# Patient Record
Sex: FEMALE | Race: WHITE | NOT HISPANIC OR LATINO | URBAN - METROPOLITAN AREA
[De-identification: names, ages, dates, MRNs, and addresses within clinical notes are randomized per-mention and may not be internally consistent; named-entity substitution may affect disease eponyms.]

---

## 2018-10-07 ENCOUNTER — INPATIENT (INPATIENT)
Facility: HOSPITAL | Age: 73
LOS: 8 days | Discharge: HOME | DRG: 580 | End: 2018-10-16
Attending: INTERNAL MEDICINE | Admitting: INTERNAL MEDICINE
Payer: MEDICARE

## 2018-10-07 VITALS
TEMPERATURE: 101 F | SYSTOLIC BLOOD PRESSURE: 134 MMHG | DIASTOLIC BLOOD PRESSURE: 83 MMHG | OXYGEN SATURATION: 97 % | WEIGHT: 179.9 LBS | HEIGHT: 62 IN | HEART RATE: 76 BPM | RESPIRATION RATE: 18 BRPM

## 2018-10-07 DIAGNOSIS — I82.492 ACUTE EMBOLISM AND THROMBOSIS OF OTHER SPECIFIED DEEP VEIN OF LEFT LOWER EXTREMITY: ICD-10-CM

## 2018-10-07 LAB
ALBUMIN SERPL ELPH-MCNC: 3.4 G/DL — SIGNIFICANT CHANGE UP (ref 3.3–5)
ALP SERPL-CCNC: 72 U/L — SIGNIFICANT CHANGE UP (ref 40–120)
ALT FLD-CCNC: 55 U/L — HIGH (ref 10–45)
ANION GAP SERPL CALC-SCNC: 11 MMOL/L — SIGNIFICANT CHANGE UP (ref 5–17)
APTT BLD: 28.7 SEC — SIGNIFICANT CHANGE UP (ref 27.5–37.4)
AST SERPL-CCNC: 47 U/L — HIGH (ref 10–40)
BASOPHILS # BLD AUTO: 0 K/UL — SIGNIFICANT CHANGE UP (ref 0–0.2)
BASOPHILS NFR BLD AUTO: 0.1 % — SIGNIFICANT CHANGE UP (ref 0–2)
BILIRUB SERPL-MCNC: 1.7 MG/DL — HIGH (ref 0.2–1.2)
BUN SERPL-MCNC: 21 MG/DL — SIGNIFICANT CHANGE UP (ref 7–23)
CALCIUM SERPL-MCNC: 9.2 MG/DL — SIGNIFICANT CHANGE UP (ref 8.4–10.5)
CHLORIDE SERPL-SCNC: 89 MMOL/L — LOW (ref 96–108)
CO2 SERPL-SCNC: 28 MMOL/L — SIGNIFICANT CHANGE UP (ref 22–31)
CREAT SERPL-MCNC: 0.82 MG/DL — SIGNIFICANT CHANGE UP (ref 0.5–1.3)
EOSINOPHIL # BLD AUTO: 0.1 K/UL — SIGNIFICANT CHANGE UP (ref 0–0.5)
EOSINOPHIL NFR BLD AUTO: 0.5 % — SIGNIFICANT CHANGE UP (ref 0–6)
GAS PNL BLDV: SIGNIFICANT CHANGE UP
GLUCOSE SERPL-MCNC: 120 MG/DL — HIGH (ref 70–99)
HCT VFR BLD CALC: 43.1 % — SIGNIFICANT CHANGE UP (ref 34.5–45)
HGB BLD-MCNC: 13.7 G/DL — SIGNIFICANT CHANGE UP (ref 11.5–15.5)
INR BLD: 1.23 RATIO — HIGH (ref 0.88–1.16)
LYMPHOCYTES # BLD AUTO: 0.8 K/UL — LOW (ref 1–3.3)
LYMPHOCYTES # BLD AUTO: 4 % — LOW (ref 13–44)
MCHC RBC-ENTMCNC: 25.5 PG — LOW (ref 27–34)
MCHC RBC-ENTMCNC: 31.7 GM/DL — LOW (ref 32–36)
MCV RBC AUTO: 80.3 FL — SIGNIFICANT CHANGE UP (ref 80–100)
MONOCYTES # BLD AUTO: 1.6 K/UL — HIGH (ref 0–0.9)
MONOCYTES NFR BLD AUTO: 7 % — SIGNIFICANT CHANGE UP (ref 2–14)
NEUTROPHILS # BLD AUTO: 15.4 K/UL — HIGH (ref 1.8–7.4)
NEUTROPHILS NFR BLD AUTO: 84 % — HIGH (ref 43–77)
PLATELET # BLD AUTO: 191 K/UL — SIGNIFICANT CHANGE UP (ref 150–400)
POTASSIUM SERPL-MCNC: 4.1 MMOL/L — SIGNIFICANT CHANGE UP (ref 3.5–5.3)
POTASSIUM SERPL-SCNC: 4.1 MMOL/L — SIGNIFICANT CHANGE UP (ref 3.5–5.3)
PROT SERPL-MCNC: 7.8 G/DL — SIGNIFICANT CHANGE UP (ref 6–8.3)
PROTHROM AB SERPL-ACNC: 13.3 SEC — HIGH (ref 9.8–12.7)
RBC # BLD: 5.37 M/UL — HIGH (ref 3.8–5.2)
RBC # FLD: 15.2 % — HIGH (ref 10.3–14.5)
SODIUM SERPL-SCNC: 128 MMOL/L — LOW (ref 135–145)
WBC # BLD: 17.9 K/UL — HIGH (ref 3.8–10.5)
WBC # FLD AUTO: 17.9 K/UL — HIGH (ref 3.8–10.5)

## 2018-10-07 PROCEDURE — 93971 EXTREMITY STUDY: CPT | Mod: 26

## 2018-10-07 PROCEDURE — 74177 CT ABD & PELVIS W/CONTRAST: CPT | Mod: 26

## 2018-10-07 PROCEDURE — 99285 EMERGENCY DEPT VISIT HI MDM: CPT

## 2018-10-07 RX ORDER — PIPERACILLIN AND TAZOBACTAM 4; .5 G/20ML; G/20ML
3.38 INJECTION, POWDER, LYOPHILIZED, FOR SOLUTION INTRAVENOUS EVERY 8 HOURS
Qty: 0 | Refills: 0 | Status: DISCONTINUED | OUTPATIENT
Start: 2018-10-07 | End: 2018-10-14

## 2018-10-07 RX ORDER — SODIUM CHLORIDE 9 MG/ML
500 INJECTION INTRAMUSCULAR; INTRAVENOUS; SUBCUTANEOUS ONCE
Qty: 0 | Refills: 0 | Status: COMPLETED | OUTPATIENT
Start: 2018-10-07 | End: 2018-10-07

## 2018-10-07 RX ORDER — HEPARIN SODIUM 5000 [USP'U]/ML
6500 INJECTION INTRAVENOUS; SUBCUTANEOUS ONCE
Qty: 0 | Refills: 0 | Status: DISCONTINUED | OUTPATIENT
Start: 2018-10-07 | End: 2018-10-07

## 2018-10-07 RX ORDER — POTASSIUM CHLORIDE 20 MEQ
10 PACKET (EA) ORAL DAILY
Qty: 0 | Refills: 0 | Status: DISCONTINUED | OUTPATIENT
Start: 2018-10-07 | End: 2018-10-13

## 2018-10-07 RX ORDER — ENOXAPARIN SODIUM 100 MG/ML
90 INJECTION SUBCUTANEOUS
Qty: 0 | Refills: 0 | Status: DISCONTINUED | OUTPATIENT
Start: 2018-10-07 | End: 2018-10-08

## 2018-10-07 RX ORDER — INFLUENZA VIRUS VACCINE 15; 15; 15; 15 UG/.5ML; UG/.5ML; UG/.5ML; UG/.5ML
0.5 SUSPENSION INTRAMUSCULAR ONCE
Qty: 0 | Refills: 0 | Status: DISCONTINUED | OUTPATIENT
Start: 2018-10-07 | End: 2018-10-16

## 2018-10-07 RX ORDER — HEPARIN SODIUM 5000 [USP'U]/ML
3000 INJECTION INTRAVENOUS; SUBCUTANEOUS EVERY 6 HOURS
Qty: 0 | Refills: 0 | Status: DISCONTINUED | OUTPATIENT
Start: 2018-10-07 | End: 2018-10-07

## 2018-10-07 RX ORDER — ASPIRIN/CALCIUM CARB/MAGNESIUM 324 MG
81 TABLET ORAL DAILY
Qty: 0 | Refills: 0 | Status: DISCONTINUED | OUTPATIENT
Start: 2018-10-07 | End: 2018-10-16

## 2018-10-07 RX ORDER — OXYCODONE AND ACETAMINOPHEN 5; 325 MG/1; MG/1
1 TABLET ORAL ONCE
Qty: 0 | Refills: 0 | Status: DISCONTINUED | OUTPATIENT
Start: 2018-10-07 | End: 2018-10-08

## 2018-10-07 RX ORDER — OXYCODONE AND ACETAMINOPHEN 5; 325 MG/1; MG/1
1 TABLET ORAL EVERY 8 HOURS
Qty: 0 | Refills: 0 | Status: DISCONTINUED | OUTPATIENT
Start: 2018-10-07 | End: 2018-10-09

## 2018-10-07 RX ORDER — METOPROLOL TARTRATE 50 MG
25 TABLET ORAL DAILY
Qty: 0 | Refills: 0 | Status: DISCONTINUED | OUTPATIENT
Start: 2018-10-07 | End: 2018-10-16

## 2018-10-07 RX ORDER — SODIUM CHLORIDE 9 MG/ML
1000 INJECTION INTRAMUSCULAR; INTRAVENOUS; SUBCUTANEOUS ONCE
Qty: 0 | Refills: 0 | Status: COMPLETED | OUTPATIENT
Start: 2018-10-07 | End: 2018-10-07

## 2018-10-07 RX ORDER — ATORVASTATIN CALCIUM 80 MG/1
10 TABLET, FILM COATED ORAL AT BEDTIME
Qty: 0 | Refills: 0 | Status: DISCONTINUED | OUTPATIENT
Start: 2018-10-07 | End: 2018-10-16

## 2018-10-07 RX ORDER — HEPARIN SODIUM 5000 [USP'U]/ML
INJECTION INTRAVENOUS; SUBCUTANEOUS
Qty: 25000 | Refills: 0 | Status: DISCONTINUED | OUTPATIENT
Start: 2018-10-07 | End: 2018-10-07

## 2018-10-07 RX ORDER — HEPARIN SODIUM 5000 [USP'U]/ML
6500 INJECTION INTRAVENOUS; SUBCUTANEOUS EVERY 6 HOURS
Qty: 0 | Refills: 0 | Status: DISCONTINUED | OUTPATIENT
Start: 2018-10-07 | End: 2018-10-07

## 2018-10-07 RX ORDER — MESALAMINE 400 MG
1000 TABLET, DELAYED RELEASE (ENTERIC COATED) ORAL DAILY
Qty: 0 | Refills: 0 | Status: DISCONTINUED | OUTPATIENT
Start: 2018-10-07 | End: 2018-10-08

## 2018-10-07 RX ORDER — ACETAMINOPHEN 500 MG
650 TABLET ORAL EVERY 6 HOURS
Qty: 0 | Refills: 0 | Status: DISCONTINUED | OUTPATIENT
Start: 2018-10-07 | End: 2018-10-07

## 2018-10-07 RX ADMIN — SODIUM CHLORIDE 1000 MILLILITER(S): 9 INJECTION INTRAMUSCULAR; INTRAVENOUS; SUBCUTANEOUS at 20:33

## 2018-10-07 RX ADMIN — Medication 100 MILLIGRAM(S): at 15:25

## 2018-10-07 RX ADMIN — ENOXAPARIN SODIUM 90 MILLIGRAM(S): 100 INJECTION SUBCUTANEOUS at 20:37

## 2018-10-07 RX ADMIN — SODIUM CHLORIDE 500 MILLILITER(S): 9 INJECTION INTRAMUSCULAR; INTRAVENOUS; SUBCUTANEOUS at 15:02

## 2018-10-07 RX ADMIN — Medication 10 MILLIGRAM(S): at 20:21

## 2018-10-07 RX ADMIN — ATORVASTATIN CALCIUM 10 MILLIGRAM(S): 80 TABLET, FILM COATED ORAL at 20:21

## 2018-10-07 RX ADMIN — Medication 650 MILLIGRAM(S): at 14:08

## 2018-10-07 RX ADMIN — PIPERACILLIN AND TAZOBACTAM 25 GRAM(S): 4; .5 INJECTION, POWDER, LYOPHILIZED, FOR SOLUTION INTRAVENOUS at 21:31

## 2018-10-07 RX ADMIN — Medication 81 MILLIGRAM(S): at 20:21

## 2018-10-07 RX ADMIN — OXYCODONE AND ACETAMINOPHEN 1 TABLET(S): 5; 325 TABLET ORAL at 20:35

## 2018-10-07 NOTE — ED PROVIDER NOTE - MEDICAL DECISION MAKING DETAILS
PASHA Chavarria MD: Pt is a 72 y/o female with pmh of  HTN, gerd, newly diagnosed with Ulcerative colitis on prednisone taper who presents to ED for left groin pain x 5 days. she first noted a sore "lump" on her left groin with mild tenderness, which have been progressively been getting worse. She notes increased pain with movement and with palpation. Has also noticed erythema to the area. Dhe denies recent trauma, fevers, chills, n/v, diarrhea, constipation, shortness of breath, MAYEN, dysuria, recent travel. DDx: cellulitis, abscess, nec fasc, other pelvic pathology. Plan: basic labs, CTAP, pain control, reassess

## 2018-10-07 NOTE — ED PROVIDER NOTE - CARE PLAN
Principal Discharge DX:	Deep vein thrombosis (DVT) of other vein of left lower extremity  Secondary Diagnosis:	Groin pain, left

## 2018-10-07 NOTE — H&P ADULT - NSHPPHYSICALEXAM_GEN_ALL_CORE
PHYSICAL EXAMINATION:  Vital Signs Last 24 Hrs  T(C): 36.9 (07 Oct 2018 13:14), Max: 38.2 (07 Oct 2018 11:44)  T(F): 98.5 (07 Oct 2018 13:14), Max: 100.7 (07 Oct 2018 11:44)  HR: 77 (07 Oct 2018 13:14) (76 - 77)  BP: 145/78 (07 Oct 2018 13:14) (134/83 - 145/78)  BP(mean): --  RR: 18 (07 Oct 2018 13:14) (18 - 18)  SpO2: 97% (07 Oct 2018 13:14) (97% - 97%)  CAPILLARY BLOOD GLUCOSE            GENERAL: NAD, well-groomed,  HEAD:  atraumatic, normocephalic  EYES: sclera anicteric  ENMT: mucous membranes moist  NECK: supple, No JVD  CHEST/LUNG: clear to auscultation bilaterally;    no      rales   ,   no rhonchi,   HEART: normal S1, S2  ABDOMEN: BS+, soft, ND, NT   EXTREMITIES:      edema    b/l LEs  left groin,  swelling/ redness  NEURO: awake, ,     moves all extremities  SKIN: no     rash PHYSICAL EXAMINATION:  Vital Signs Last 24 Hrs  T(C): 36.9 (07 Oct 2018 13:14), Max: 38.2 (07 Oct 2018 11:44)  T(F): 98.5 (07 Oct 2018 13:14), Max: 100.7 (07 Oct 2018 11:44)  HR: 77 (07 Oct 2018 13:14) (76 - 77)  BP: 145/78 (07 Oct 2018 13:14) (134/83 - 145/78)  BP(mean): --  RR: 18 (07 Oct 2018 13:14) (18 - 18)  SpO2: 97% (07 Oct 2018 13:14) (97% - 97%)  CAPILLARY BLOOD GLUCOSE            GENERAL: NAD, well-groomed,  HEAD:  atraumatic, normocephalic  EYES: sclera anicteric  ENMT: mucous membranes moist  NECK: supple, No JVD  CHEST/LUNG: clear to auscultation bilaterally;    no      rales   ,   no rhonchi,   HEART: normal S1, S2  ABDOMEN: BS+, soft, ND, NT   area  over mons  pubis, harriet on left side, has   marked  swelling/ redness/ tender  to touch,    EXTREMITIES:      edema    b/l LEs  left groin,  swelling/ redness  NEURO: awake, ,     moves all extremities  SKIN: no     rash

## 2018-10-07 NOTE — CONSULT NOTE ADULT - SUBJECTIVE AND OBJECTIVE BOX
GENERAL SURGERY CONSULT NOTE  Attending: Dr. Doshi  Service: Mitch  Contact: [phone]    HPI: 73F w/recently diagnosed ulcerative colitis on prednisone taper presents with 3-day h/o left groin swelling and pain. She was concerned it was a hernia due to sudden bulge in left groin. She has been tolerating regular diet and denies nausea or vomiting. Passing flatus and BMs. Denies fevers or chills. A CT of the L leg A/P was obtained to evaluate for abscess and was initially concerning for L CFV DVT -- however on f/u ultrasound she was found to not have DVT. She denies dysuria, SOB, CP, fatigue, lightheadness, trauma to area or breaks in skin.    ROS: 10-point ROS negative except as noted above.    PMH/PSH  ulcerative colitis  HTN  breast cancer  s/p hysterectomy  back surgery  oophorectomy    MEDICATIONS  aspirin enteric coated 81 milliGRAM(s) Oral daily  atorvastatin 10 milliGRAM(s) Oral at bedtime  enoxaparin Injectable 90 milliGRAM(s) SubCutaneous two times a day  mesalamine ER Capsule 1000 milliGRAM(s) Oral daily  metoprolol succinate ER 25 milliGRAM(s) Oral daily  oxyCODONE    5 mG/acetaminophen 325 mG 1 Tablet(s) Oral every 8 hours PRN  piperacillin/tazobactam IVPB. 3.375 Gram(s) IV Intermittent every 8 hours  potassium chloride    Tablet ER 10 milliEquivalent(s) Oral daily  predniSONE   Tablet 10 milliGRAM(s) Oral daily    Allergies  No Known Allergies    Social  Lives alone. Denies ETOH or tobacco use.     Physical Exam  T(C): 36.9 (10-07-18 @ 13:14), Max: 38.2 (10-07-18 @ 11:44)  HR: 77 (10-07-18 @ 13:14) (76 - 77)  BP: 145/78 (10-07-18 @ 13:14) (134/83 - 145/78)  RR: 18 (10-07-18 @ 13:14) (18 - 18)  SpO2: 97% (10-07-18 @ 13:14) (97% - 97%)  Tmax: T(C): , Max: 38.2 (10-07-18 @ 11:44)    Gen: NAD  Neuro: AAOx3  HEENT: normocephalic, atraumatic, no scleral icterus  CV: S1, S2, RRR  Pulm: CTA B/L  Abd: Soft, ND, NT, no rebound, no guarding, no palpable organomegaly/masses  Ext: Left groin and suprapubic area with extensive erythema, induration and warmth wrapping to left flank, no crepitus, no bullae, no fluctuance or drainage, minimally tender, no evidence of hernia    LABS                        13.7   17.9  )-----------( 191      ( 07 Oct 2018 14:09 )             43.1     10-07    128<L>  |  89<L>  |  21  ----------------------------<  120<H>  4.1   |  28  |  0.82    Ca    9.2      07 Oct 2018 14:09    TPro  7.8  /  Alb  3.4  /  TBili  1.7<H>  /  DBili  x   /  AST  47<H>  /  ALT  55<H>  /  AlkPhos  72  10-07    PT/INR - ( 07 Oct 2018 18:39 )   PT: 13.3 sec;   INR: 1.23 ratio    PTT - ( 07 Oct 2018 18:39 )  PTT:28.7 sec    Blood Gas Venous - Lactate: 6.3 mmoL/L (10.07.18 @ 19:17)    IMAGING  < from: CT Abdomen and Pelvis w/ IV Cont (10.07.18 @ 17:12) >  IMPRESSION:     Deep venous thrombosis involving the left common femoral vein. Inferior   extent of the thrombus is not visualized. Questionable involvement of the   left great saphenous vein.    Inflammatory changes of the left inguinal soft tissues extending into the   left perineum. No drainable fluid collection.    < end of copied text >    < from: [facility] Duplex Lower Ext Vein Scan, Left (10.07.18 @ 20:16) >    IMPRESSION:     No evidence of left lower extremity deep venous thrombosis.    < end of copied text >

## 2018-10-07 NOTE — H&P ADULT - ASSESSMENT
pt  admitted  with abd  pain/ / left  groin pain   SEEN IN     er.   , with elevated wbc/  hyponatremia/  dehydration   ct abdomen, noted, with dvt   on  a/c / iv   heparin  cta  chest  in  am  doppler legs  labs  in am    elevated wbc, from  steroid, for UC  Zosyn/ redness in  groin   ID eval  echo pt  admitted  with abd  pain/ / left  groin pain   SEEN IN     er.   , with elevated wbc/  hyponatremia/  dehydration   ct abdomen, noted, with dvt   on  a/c / iv   heparin  cta  chest  in  am. a s pt recvd  contrast in  er, now  doppler legs  labs  in am    elevated wbc, from  steroid,   on zosyn/  redness left groin  ID  eval    < from: CT Abdomen and Pelvis w/ IV Cont (10.07.18 @ 17:12) >    IMPRESSION:     Deep venous thrombosis involving the left common femoral vein. Inferior   extent of the thrombus is not visualized. Questionable involvement of the   left great saphenous vein.    Inflammatory changes of the left inguinal soft tissues extending into the   left perineum. No drainable fluid collection.    Dr. Chanel discussed these findings with Dr. Dao on 10/7/2018 5:48 PM pt  admitted  with  h/o abd  pain/ / left  groin pain       , with elevated wbc/  hyponatremia/  dehydration   ct abdomen, noted, with dvt   on  a/c / lovenox  cta  chest  in  am. as ,  pt recvd  contrast in  er, now  doppler legs  labs  in am    elevated wbc, from  steroid,  and from  cellulitis/ ?  abscess supra pubic area   surg  eval called  by er  on zosyn/    ID  eval    < from: CT Abdomen and Pelvis w/ IV Cont (10.07.18 @ 17:12) >    IMPRESSION:     Deep venous thrombosis involving the left common femoral vein. Inferior   extent of the thrombus is not visualized. Questionable involvement of the   left great saphenous vein.    Inflammatory changes of the left inguinal soft tissues extending into the   left perineum. No drainable fluid collection.    Dr. Chanel discussed these findings with Dr. Dao on 10/7/2018 5:48 PM

## 2018-10-07 NOTE — CONSULT NOTE ADULT - ASSESSMENT
73F w/recent dx of ulcerative colitis on prednisone and mesalamine p/w 4-day h/o left groin pain, swelling, erythema.    - no acute surgical intervention  - while hyponatremic, febrile, with a leukocytosis and elevated lactate, low suspicion for necrotizing infection given minimal degree of tenderness on exam and presence of symptoms for 4 days without evidence of crepitus, rapidly spreading infection, or hemodynamic instability; however patient is immunocompromised on steroids and should be monitored closely  - LRINEC score = 3 (without CRP, which is pending)  - recommend IV fluid resuscitation and broad spectrum abx including gram positive/MRSA coverage  - recommend repeating lactate  - border of erythema marked with pen, monitor for extension/regression on IV abx  - d/w Dr. Doshi  - please call w/questions    A Diana, R4  [id number]

## 2018-10-07 NOTE — ED PROVIDER NOTE - PROGRESS NOTE DETAILS
Sangita Dao MD - Attending Physician: Called by Radiology. Extensive DVT from common fem through ext iliacs on Left. Cannot see inferior edge as CT does not extend, so recommends US. No focal abscess but noted inflammatory changes to L groin. discussed with pt results of CT showing large DVT, requiring heparin drip and doppler to view extent of DVT. We discussed that pt has no hx of GI bleeds, has recent UC but no rectal bleeding, no hx of hemorrhagic CVA or bleeding issues. -Bindu Smith PA-C Spoke with surgery for consult on large DVT, will see pt in ED. -Bindu Smith PA-C

## 2018-10-07 NOTE — ED ADULT NURSE NOTE - OBJECTIVE STATEMENT
73 yr old female to ed, accomp by children, C/o l groin swelling, redness since Wednesday. Denies trauma or heavy lifting. Afebrile. Denies fever or chills. Denies sob or chest pain. Denies burn or diff urinating. Denies N/V Denies abd pain. To l groin redness, swelling, painful and warm to touch. Took 2 Aleve yesterday with min relief,

## 2018-10-07 NOTE — CONSULT NOTE ADULT - ASSESSMENT
pt with fever, groin mass, yarbrough with possible dvt  cta r/o pulmonary emboli  AC  echo  ID eval  tsh  lipid  doppler le pt with fever, pubic area cellulitis, yarbrough with  dvt  cta r/o pulmonary emboli  AC  echo  ID eval  tsh  lipid  doppler le  iv abx vanco/zosyn  continue bp meds will adjust meds  ecg

## 2018-10-07 NOTE — H&P ADULT - HISTORY OF PRESENT ILLNESS
Prescription faxed.  He is contagious until he is on the antibiotics for 24 hours.   72 yo female with pmh of  HTN, gerd,    newly diagnosed with Ulcerative colitis admitted  with left groin pain and   abd  pain . no  fevers  denies  cp ct abdomen  in  er/  with dvt 72 yo female with pmh of  HTN, gerd,    newly diagnosed with Ulcerative colitis admitted  with left groin pain and   abd  pain and fevers.  sob on exertion   denies  cp ct abdomen  in  er/  with dvt 72 yo female with pmh of  HTN, gerd,,  ca  breast  5  yeara ago,     newly diagnosed with Ulcerative colitis, had  colonoscopy,  3  weeks ago admitted  with left groin pain and   abd  pain and fevers. had  a pimple    in supra  pubic  area, has  worsened  with swelling/ redness   sob on exertion   denies  cp ct abdomen  in  er/  with dvt

## 2018-10-07 NOTE — ED ADULT NURSE REASSESSMENT NOTE - NS ED NURSE REASSESS COMMENT FT1
report received from Margarito Booker. pt in no acute distress, pt going off unit to CT, half the bottle of contrast drank. MD aware. son at bedside report recieved from MICHEL Booker pt off unit at vascular. pt to be given heparin upon return

## 2018-10-07 NOTE — CONSULT NOTE ADULT - SUBJECTIVE AND OBJECTIVE BOX
CHIEF COMPLAINT:Patient is a 73y old  Female who presents with a chief complaint of groin mass, sob.    HPI:  pt is a 72 yo mfemale with newly dx with ulcerative colitis who presented to er with fever groin mass.pt also c/o yarbrough .  in er pt found to have massive dvt.  no hx of cardiac disease.      PAST MEDICAL & SURGICAL HISTORY:  Colitis      MEDICATIONS  (STANDING):  heparin  Infusion.  Unit(s)/Hr (15 mL/Hr) IV Continuous <Continuous>  heparin  Injectable 6500 Unit(s) IV Push once    MEDICATIONS  (PRN):  acetaminophen   Tablet .. 650 milliGRAM(s) Oral every 6 hours PRN Moderate Pain (4 - 6)  heparin  Injectable 6500 Unit(s) IV Push every 6 hours PRN For aPTT less than 40  heparin  Injectable 3000 Unit(s) IV Push every 6 hours PRN For aPTT between 40 - 57      FAMILY HISTORY:      SOCIAL HISTORY:    [ ] Non-smoker  [ ] Smoker  [ ] Alcohol    Allergies    No Known Allergies    Intolerances    	    REVIEW OF SYSTEMS:  CONSTITUTIONAL: No fever, weight loss, or fatigue  EYES: No eye pain, visual disturbances, or discharge  ENT:  No difficulty hearing, tinnitus, vertigo; No sinus or throat pain  NECK: No pain or stiffness  RESPIRATORY: No cough, wheezing, chills or hemoptysis; + Shortness of Breath  CARDIOVASCULAR: No chest pain, palpitations, passing out, dizziness, or leg swelling  GASTROINTESTINAL: No abdominal or epigastric pain. No nausea, vomiting, or hematemesis; No diarrhea or constipation. No melena or hematochezia.  GENITOURINARY: No dysuria, frequency, hematuria, or incontinence  NEUROLOGICAL: No headaches, memory loss, loss of strength, numbness, or tremors  SKIN: No itching, burning, rashes, or lesions   LYMPH Nodes: + groin mass  ENDOCRINE: No heat or cold intolerance; No hair loss  MUSCULOSKELETAL: No joint pain or swelling; No muscle, back, or extremity pain  PSYCHIATRIC: No depression, anxiety, mood swings, or difficulty sleeping  HEME/LYMPH: No easy bruising, or bleeding gums  ALLERGY AND IMMUNOLOGIC: No hives or eczema	    [ ] All others negative	  [ ] Unable to obtain    PHYSICAL EXAM:  T(C): 36.9 (10-07-18 @ 13:14), Max: 38.2 (10-07-18 @ 11:44)  HR: 77 (10-07-18 @ 13:14) (76 - 77)  BP: 145/78 (10-07-18 @ 13:14) (134/83 - 145/78)  RR: 18 (10-07-18 @ 13:14) (18 - 18)  SpO2: 97% (10-07-18 @ 13:14) (97% - 97%)  Wt(kg): --  I&O's Summary      Appearance: Normal	  HEENT:   Normal oral mucosa, PERRL, EOMI	  Lymphatic: No lymphadenopathy  Cardiovascular: Normal S1 S2, No JVD, + murmurs, No edema  Respiratory: Lungs clear to auscultation	  Psychiatry: A & O x 3, Mood & affect appropriate  Gastrointestinal:  Soft, Non-tender, + BS	  Skin: No rashes, No ecchymoses, No cyanosis	  Neurologic: Non-focal  Extremities: Normal range of motion, No clubbing, cyanosis or edema  Vascular: Peripheral pulses palpable 2+ bilaterally    TELEMETRY: 	    ECG:  	  RADIOLOGY:  OTHER: 	  	  LABS:	 	    CARDIAC MARKERS:                              13.7   17.9  )-----------( 191      ( 07 Oct 2018 14:09 )             43.1     10-07    128<L>  |  89<L>  |  21  ----------------------------<  120<H>  4.1   |  28  |  0.82    Ca    9.2      07 Oct 2018 14:09    TPro  7.8  /  Alb  3.4  /  TBili  1.7<H>  /  DBili  x   /  AST  47<H>  /  ALT  55<H>  /  AlkPhos  72  10-07    proBNP:   Lipid Profile:   HgA1c:   TSH:   PT/INR - ( 07 Oct 2018 18:39 )   PT: 13.3 sec;   INR: 1.23 ratio         PTT - ( 07 Oct 2018 18:39 )  PTT:28.7 sec    PREVIOUS DIAGNOSTIC TESTING:    < from: CT Abdomen and Pelvis w/ IV Cont (10.07.18 @ 17:12) >  Deep venous thrombosis involving the left common femoral vein. Inferior   extent of the thrombus is not visualized. Questionable involvement of the   left great saphenous vein.    Inflammatory changes of the left inguinal soft tissues extending into the   left perineum. No drainable fluid collection. CHIEF COMPLAINT:Patient is a 73y old  Female who presents with a chief complaint of groin mass, sob.    HPI:  pt is a 74 yo mfemale with newly dx with ulcerative colitis who presented to er with fever groin mass.pt also c/o yarbrough .  in er pt found to have massive dvt.  no hx of cardiac disease.      PAST MEDICAL & SURGICAL HISTORY:  Colitis      MEDICATIONS  (STANDING):  heparin  Infusion.  Unit(s)/Hr (15 mL/Hr) IV Continuous <Continuous>  heparin  Injectable 6500 Unit(s) IV Push once    MEDICATIONS  (PRN):  acetaminophen   Tablet .. 650 milliGRAM(s) Oral every 6 hours PRN Moderate Pain (4 - 6)  heparin  Injectable 6500 Unit(s) IV Push every 6 hours PRN For aPTT less than 40  heparin  Injectable 3000 Unit(s) IV Push every 6 hours PRN For aPTT between 40 - 57      FAMILY HISTORY:      SOCIAL HISTORY:    [ ] Non-smoker  [ ] Smoker  [ ] Alcohol    Allergies    No Known Allergies    Intolerances    	    REVIEW OF SYSTEMS:  CONSTITUTIONAL: No fever, weight loss, or fatigue  EYES: No eye pain, visual disturbances, or discharge  ENT:  No difficulty hearing, tinnitus, vertigo; No sinus or throat pain  NECK: No pain or stiffness  RESPIRATORY: No cough, wheezing, chills or hemoptysis; + exertional Shortness of Breath  CARDIOVASCULAR: No chest pain, palpitations, passing out, dizziness, or leg swelling  GASTROINTESTINAL: No abdominal or epigastric pain. No nausea, vomiting, or hematemesis; No diarrhea or constipation. No melena or hematochezia.  GENITOURINARY: No dysuria, + rednedd in pubic area, tender to touch  NEUROLOGICAL: No headaches, memory loss, loss of strength, numbness, or tremors  SKIN: No itching, burning, rashes, or lesions   LYMPH Nodes: + groin mass  ENDOCRINE: No heat or cold intolerance; No hair loss  MUSCULOSKELETAL: No joint pain or swelling; No muscle, back, or extremity pain  PSYCHIATRIC: No depression, anxiety, mood swings, or difficulty sleeping  HEME/LYMPH: No easy bruising, or bleeding gums  ALLERGY AND IMMUNOLOGIC: No hives or eczema	    [ ] All others negative	  [ ] Unable to obtain    PHYSICAL EXAM:  T(C): 36.9 (10-07-18 @ 13:14), Max: 38.2 (10-07-18 @ 11:44)  HR: 77 (10-07-18 @ 13:14) (76 - 77)  BP: 145/78 (10-07-18 @ 13:14) (134/83 - 145/78)  RR: 18 (10-07-18 @ 13:14) (18 - 18)  SpO2: 97% (10-07-18 @ 13:14) (97% - 97%)  Wt(kg): --  I&O's Summary      Appearance: Normal	  HEENT:   Normal oral mucosa, PERRL, EOMI	  Lymphatic: No lymphadenopathy  Cardiovascular: Normal S1 S2, No JVD, + murmurs, No edema  Respiratory: Lungs clear to auscultation	  Psychiatry: A & O x 3, Mood & affect appropriate  Gastrointestinal:  Soft, Non-tender, + BS	  Skin: No rashes, No ecchymoses, No cyanosis	  Neurologic: Non-focal  Extremities: Normal range of motion, No clubbing, cyanosis or edema  Vascular: Peripheral pulses palpable 2+ bilaterally    TELEMETRY: 	    ECG:  	  RADIOLOGY:  OTHER: 	  	  LABS:	 	    CARDIAC MARKERS:                              13.7   17.9  )-----------( 191      ( 07 Oct 2018 14:09 )             43.1     10-07    128<L>  |  89<L>  |  21  ----------------------------<  120<H>  4.1   |  28  |  0.82    Ca    9.2      07 Oct 2018 14:09    TPro  7.8  /  Alb  3.4  /  TBili  1.7<H>  /  DBili  x   /  AST  47<H>  /  ALT  55<H>  /  AlkPhos  72  10-07    proBNP:   Lipid Profile:   HgA1c:   TSH:   PT/INR - ( 07 Oct 2018 18:39 )   PT: 13.3 sec;   INR: 1.23 ratio         PTT - ( 07 Oct 2018 18:39 )  PTT:28.7 sec    PREVIOUS DIAGNOSTIC TESTING:    < from: CT Abdomen and Pelvis w/ IV Cont (10.07.18 @ 17:12) >  Deep venous thrombosis involving the left common femoral vein. Inferior   extent of the thrombus is not visualized. Questionable involvement of the   left great saphenous vein.    Inflammatory changes of the left inguinal soft tissues extending into the   left perineum. No drainable fluid collection.

## 2018-10-07 NOTE — ED PROVIDER NOTE - OBJECTIVE STATEMENT
72 yo female with pmh of  HTN, gerd, newly diagnosed with Ulcerative colitis seen 72 yo female with pmh of  HTN, gerd, newly diagnosed with Ulcerative colitis on prednisone taper presenting to ED for left groin/abdominal pain for the pst 5 days. she first noted a sore "lump" on her left groin with mild tenderness, which have been progressively been getting worse. She notes increased pain with movement and with palpation, pain has increased to the point that it would not be relieved by aleve which she was previously taking. she denies recent trauma, fevers, chills, n/v, diarrhea, constipation, shortness of breath, MAYEN, dysuria, recent travel.

## 2018-10-07 NOTE — H&P ADULT - NSHPREVIEWOFSYSTEMS_GEN_ALL_CORE
REVIEW OF SYSTEMS:  GEN: no fever,    no chills  RESP: SOB,   no cough  CVS: no chest pain,   no palpitations  GI: no abdominal pain,   no nausea,   no vomiting,   no constipation,   no diarrhea  : no dysuria,   no frequency  NEURO: no headache,   no dizziness  PSYCH: no depression,   not anxious  Derm : no rash

## 2018-10-08 LAB
ANION GAP SERPL CALC-SCNC: 7 MMOL/L — SIGNIFICANT CHANGE UP (ref 5–17)
ANION GAP SERPL CALC-SCNC: 9 MMOL/L — SIGNIFICANT CHANGE UP (ref 5–17)
BUN SERPL-MCNC: 17 MG/DL — SIGNIFICANT CHANGE UP (ref 7–23)
BUN SERPL-MCNC: 19 MG/DL — SIGNIFICANT CHANGE UP (ref 7–23)
CALCIUM SERPL-MCNC: 7.9 MG/DL — LOW (ref 8.4–10.5)
CALCIUM SERPL-MCNC: 8 MG/DL — LOW (ref 8.4–10.5)
CHLORIDE SERPL-SCNC: 95 MMOL/L — LOW (ref 96–108)
CHLORIDE SERPL-SCNC: 97 MMOL/L — SIGNIFICANT CHANGE UP (ref 96–108)
CO2 SERPL-SCNC: 25 MMOL/L — SIGNIFICANT CHANGE UP (ref 22–31)
CO2 SERPL-SCNC: 27 MMOL/L — SIGNIFICANT CHANGE UP (ref 22–31)
CREAT SERPL-MCNC: 0.85 MG/DL — SIGNIFICANT CHANGE UP (ref 0.5–1.3)
CREAT SERPL-MCNC: 0.85 MG/DL — SIGNIFICANT CHANGE UP (ref 0.5–1.3)
CRP SERPL-MCNC: 27.52 MG/DL — HIGH (ref 0–0.4)
GLUCOSE SERPL-MCNC: 114 MG/DL — HIGH (ref 70–99)
GLUCOSE SERPL-MCNC: 122 MG/DL — HIGH (ref 70–99)
HCT VFR BLD CALC: 36.3 % — SIGNIFICANT CHANGE UP (ref 34.5–45)
HGB BLD-MCNC: 11.7 G/DL — SIGNIFICANT CHANGE UP (ref 11.5–15.5)
MAGNESIUM SERPL-MCNC: 1.9 MG/DL — SIGNIFICANT CHANGE UP (ref 1.6–2.6)
MCHC RBC-ENTMCNC: 25.5 PG — LOW (ref 27–34)
MCHC RBC-ENTMCNC: 32.2 GM/DL — SIGNIFICANT CHANGE UP (ref 32–36)
MCV RBC AUTO: 79.3 FL — LOW (ref 80–100)
PLATELET # BLD AUTO: 192 K/UL — SIGNIFICANT CHANGE UP (ref 150–400)
POTASSIUM SERPL-MCNC: 3 MMOL/L — LOW (ref 3.5–5.3)
POTASSIUM SERPL-MCNC: 4.2 MMOL/L — SIGNIFICANT CHANGE UP (ref 3.5–5.3)
POTASSIUM SERPL-SCNC: 3 MMOL/L — LOW (ref 3.5–5.3)
POTASSIUM SERPL-SCNC: 4.2 MMOL/L — SIGNIFICANT CHANGE UP (ref 3.5–5.3)
RBC # BLD: 4.58 M/UL — SIGNIFICANT CHANGE UP (ref 3.8–5.2)
RBC # FLD: 16.1 % — HIGH (ref 10.3–14.5)
SODIUM SERPL-SCNC: 129 MMOL/L — LOW (ref 135–145)
SODIUM SERPL-SCNC: 131 MMOL/L — LOW (ref 135–145)
WBC # BLD: 16.35 K/UL — HIGH (ref 3.8–10.5)
WBC # FLD AUTO: 16.35 K/UL — HIGH (ref 3.8–10.5)

## 2018-10-08 RX ORDER — NYSTATIN CREAM 100000 [USP'U]/G
1 CREAM TOPICAL
Qty: 0 | Refills: 0 | Status: DISCONTINUED | OUTPATIENT
Start: 2018-10-08 | End: 2018-10-16

## 2018-10-08 RX ORDER — MESALAMINE 400 MG
1.5 TABLET, DELAYED RELEASE (ENTERIC COATED) ORAL DAILY
Qty: 0 | Refills: 0 | Status: DISCONTINUED | OUTPATIENT
Start: 2018-10-08 | End: 2018-10-16

## 2018-10-08 RX ORDER — CALCIUM CARBONATE 500(1250)
1 TABLET ORAL
Qty: 0 | Refills: 0 | Status: DISCONTINUED | OUTPATIENT
Start: 2018-10-08 | End: 2018-10-13

## 2018-10-08 RX ORDER — ACETAMINOPHEN 500 MG
650 TABLET ORAL EVERY 6 HOURS
Qty: 0 | Refills: 0 | Status: DISCONTINUED | OUTPATIENT
Start: 2018-10-08 | End: 2018-10-16

## 2018-10-08 RX ORDER — VANCOMYCIN HCL 1 G
1000 VIAL (EA) INTRAVENOUS ONCE
Qty: 0 | Refills: 0 | Status: COMPLETED | OUTPATIENT
Start: 2018-10-08 | End: 2018-10-08

## 2018-10-08 RX ORDER — TRAMADOL HYDROCHLORIDE 50 MG/1
50 TABLET ORAL
Qty: 0 | Refills: 0 | Status: DISCONTINUED | OUTPATIENT
Start: 2018-10-08 | End: 2018-10-09

## 2018-10-08 RX ORDER — OXYCODONE AND ACETAMINOPHEN 5; 325 MG/1; MG/1
2 TABLET ORAL ONCE
Qty: 0 | Refills: 0 | Status: DISCONTINUED | OUTPATIENT
Start: 2018-10-08 | End: 2018-10-08

## 2018-10-08 RX ORDER — POTASSIUM CHLORIDE 20 MEQ
40 PACKET (EA) ORAL EVERY 4 HOURS
Qty: 0 | Refills: 0 | Status: COMPLETED | OUTPATIENT
Start: 2018-10-08 | End: 2018-10-08

## 2018-10-08 RX ADMIN — OXYCODONE AND ACETAMINOPHEN 1 TABLET(S): 5; 325 TABLET ORAL at 00:40

## 2018-10-08 RX ADMIN — Medication 1.5 GRAM(S): at 17:28

## 2018-10-08 RX ADMIN — OXYCODONE AND ACETAMINOPHEN 1 TABLET(S): 5; 325 TABLET ORAL at 05:20

## 2018-10-08 RX ADMIN — Medication 10 MILLIEQUIVALENT(S): at 00:03

## 2018-10-08 RX ADMIN — OXYCODONE AND ACETAMINOPHEN 2 TABLET(S): 5; 325 TABLET ORAL at 10:50

## 2018-10-08 RX ADMIN — Medication 10 MILLIEQUIVALENT(S): at 12:02

## 2018-10-08 RX ADMIN — TRAMADOL HYDROCHLORIDE 50 MILLIGRAM(S): 50 TABLET ORAL at 22:00

## 2018-10-08 RX ADMIN — OXYCODONE AND ACETAMINOPHEN 1 TABLET(S): 5; 325 TABLET ORAL at 00:03

## 2018-10-08 RX ADMIN — Medication 40 MILLIEQUIVALENT(S): at 04:52

## 2018-10-08 RX ADMIN — Medication 81 MILLIGRAM(S): at 12:02

## 2018-10-08 RX ADMIN — Medication 40 MILLIEQUIVALENT(S): at 00:39

## 2018-10-08 RX ADMIN — PIPERACILLIN AND TAZOBACTAM 25 GRAM(S): 4; .5 INJECTION, POWDER, LYOPHILIZED, FOR SOLUTION INTRAVENOUS at 14:28

## 2018-10-08 RX ADMIN — ENOXAPARIN SODIUM 90 MILLIGRAM(S): 100 INJECTION SUBCUTANEOUS at 05:18

## 2018-10-08 RX ADMIN — OXYCODONE AND ACETAMINOPHEN 1 TABLET(S): 5; 325 TABLET ORAL at 04:51

## 2018-10-08 RX ADMIN — PIPERACILLIN AND TAZOBACTAM 25 GRAM(S): 4; .5 INJECTION, POWDER, LYOPHILIZED, FOR SOLUTION INTRAVENOUS at 21:30

## 2018-10-08 RX ADMIN — PIPERACILLIN AND TAZOBACTAM 25 GRAM(S): 4; .5 INJECTION, POWDER, LYOPHILIZED, FOR SOLUTION INTRAVENOUS at 05:19

## 2018-10-08 RX ADMIN — Medication 1 TABLET(S): at 19:28

## 2018-10-08 RX ADMIN — ATORVASTATIN CALCIUM 10 MILLIGRAM(S): 80 TABLET, FILM COATED ORAL at 21:30

## 2018-10-08 RX ADMIN — Medication 650 MILLIGRAM(S): at 19:29

## 2018-10-08 RX ADMIN — OXYCODONE AND ACETAMINOPHEN 2 TABLET(S): 5; 325 TABLET ORAL at 11:20

## 2018-10-08 RX ADMIN — Medication 250 MILLIGRAM(S): at 03:56

## 2018-10-08 RX ADMIN — TRAMADOL HYDROCHLORIDE 50 MILLIGRAM(S): 50 TABLET ORAL at 21:30

## 2018-10-08 NOTE — PROGRESS NOTE ADULT - SUBJECTIVE AND OBJECTIVE BOX
- Patient seen and examined.  - In summary, patient is a 73 year old woman who presented with LEFT GROIN PAIN (08 Oct 2018 09:58)  - Today, patient is without complaints.         *****MEDICATIONS:    MEDICATIONS  (STANDING):  aspirin enteric coated 81 milliGRAM(s) Oral daily  atorvastatin 10 milliGRAM(s) Oral at bedtime  influenza   Vaccine 0.5 milliLiter(s) IntraMuscular once  mesalamine ER Capsule 1000 milliGRAM(s) Oral daily  metoprolol succinate ER 25 milliGRAM(s) Oral daily  piperacillin/tazobactam IVPB. 3.375 Gram(s) IV Intermittent every 8 hours  potassium chloride    Tablet ER 10 milliEquivalent(s) Oral daily  predniSONE   Tablet 10 milliGRAM(s) Oral daily    MEDICATIONS  (PRN):  acetaminophen   Tablet .. 650 milliGRAM(s) Oral every 6 hours PRN Temp greater or equal to 38C (100.4F), Mild Pain (1 - 3)  oxyCODONE    5 mG/acetaminophen 325 mG 1 Tablet(s) Oral every 8 hours PRN Moderate Pain (4 - 6)           ***** REVIEW OF SYSTEM:  GEN: no fever, no chills, no pain  RESP: no SOB, no cough, no sputum  CVS: no chest pain, no palpitations, no edema  GI: no abdominal pain, no nausea, no vomiting, no constipation, no diarrhea  : no dysuria, no frequency  NEURO: no headache, no dizziness  PSYCH: no depression, not anxious  Derm : no itching, no rash         ***** VITAL SIGNS:  T(F): 98.8 (10-08-18 @ 10:35), Max: 101.7 (10-08-18 @ 10:05)  HR: 82 (10-08-18 @ 10:05) (70 - 82)  BP: 120/78 (10-08-18 @ 10:05) (102/60 - 145/78)  RR: 18 (10-08-18 @ 10:05) (16 - 20)  SpO2: 97% (10-08-18 @ 10:05) (97% - 98%)  Wt(kg): --  ,   I&O's Summary           *****PHYSICAL EXAM:  GEN: A&O X 3 , NAD , comfortable  HEENT: NCAT, EOMI, MMM, no icterus  NECK: Supple, No JVD  CVS: S1S2 , regular , No M/R/G appreciated  PULM: CTA B/L,  no W/R/R appreciated  ABD.: soft. non tender, non distended,  bowel sounds present  Extrem: intact pulses , no edema noted  Derm: No rash or ecchymosis noted  PSYCH: normal mood, no depression, not anxious         *****LAB AND IMAGIN.7   16.35 )-----------( 192      ( 08 Oct 2018 08:03 )             36.3               10    131<L>  |  97  |  17  ----------------------------<  114<H>  4.2   |  27  |  0.85    Ca    7.9<L>      08 Oct 2018 06:40  Mg     1.9     10-08    TPro  7.8  /  Alb  3.4  /  TBili  1.7<H>  /  DBili  x   /  AST  47<H>  /  ALT  55<H>  /  AlkPhos  72  10-07    PT/INR - ( 07 Oct 2018 18:39 )   PT: 13.3 sec;   INR: 1.23 ratio         PTT - ( 07 Oct 2018 18:39 )  PTT:28.7 sec                     [All pertinent recent Imaging/Reports reviewed]         *****A S S E S S M E N T   A N D   P L A N :  73F with fever, pubic area cellulitis  no  dvt on LE doppler  echo pending  await ID eval  on iv abx  continue bp meds    __________________________  GO Gerber D.O.

## 2018-10-08 NOTE — CONSULT NOTE ADULT - ASSESSMENT
72 yo female with left groin/suprapubic/perineal cellulitis.  No obvious predisposing factors although steroids may be placing her at risk.  UC places her at risk for Pyoderma Gangrenosum although this looks more infectious.  I suspect a staph infection, would favor warm compresses to see if it becomes fluctuant.  Suggest:  1.Will add vanco for MRSA coverage  2.Will leave on zosyn but may narrow coverage to staph/strep  3.Favor warm compresses to see if it can localize further and allow for drainage.  4.await blood culture results

## 2018-10-08 NOTE — PROGRESS NOTE ADULT - ASSESSMENT
A: 73F w/recent dx of ulcerative colitis on prednisone and mesalamine p/w 4-day h/o left groin pain, swelling, erythema.    P:   - no acute surgical intervention  - monitor for signs of infection d/t immunocompromised state (on prednisone for UC), low susp for nec infection given exam  - LRINEC score = 3, CRP now 27.5  - recommend IV fluid resuscitation and broad spectrum abx including gram positive/MRSA coverage  - recommend repeating lactate  - border of erythema marked with pen, monitor for extension/regression on IV abx    Sangita Lenz, PGY-1  General Surgery Green Team [phone] A: 73F w/recent dx of ulcerative colitis on prednisone and mesalamine p/w 4-day h/o left groin pain, swelling, erythema.    P:   - no acute surgical intervention  - monitor for signs of infection d/t immunocompromised state (on prednisone for UC), low susp for nec infection given exam  - LRINEC score = 7, CRP now 27.5  - recommend IV fluid resuscitation and broad spectrum abx including gram positive/MRSA coverage  - recommend repeating lactate  - border of erythema marked with pen, monitor for extension/regression on IV abx    Sangita Lenz, PGY-1  General Surgery Green Team [phone]

## 2018-10-08 NOTE — CONSULT NOTE ADULT - SUBJECTIVE AND OBJECTIVE BOX
HPI:   Patient is a 73y female with a past history of HTN,HLD,recent diagnosis of ulcerative colitis and placed on mesalamine with prednisone taper who is admitted with left groin/suprapubic cellulitis.She c/o pain in this region x 5 days.She denies any trauma or recent skin infections.She was unaware of fever but was febrile in hospital to 101.7.Her colitis has been well controlled.Her dose of prednisone is ?10 mg daily.    REVIEW OF SYSTEMS:  All other review of systems negative (Comprehensive ROS)    PAST MEDICAL & SURGICAL HISTORY:  Colitis, UC  HTN  tonsillectomy, lumpectomy,hysterectomy, spine surgery x 2 with hardware    Allergies    No Known Allergies    Intolerances        Antimicrobials Day #  :  piperacillin/tazobactam IVPB. 3.375 Gram(s) IV Intermittent every 8 hours    Other Medications:  acetaminophen   Tablet .. 650 milliGRAM(s) Oral every 6 hours PRN  aspirin enteric coated 81 milliGRAM(s) Oral daily  atorvastatin 10 milliGRAM(s) Oral at bedtime  influenza   Vaccine 0.5 milliLiter(s) IntraMuscular once  mesalamine ER Capsule 1000 milliGRAM(s) Oral daily  metoprolol succinate ER 25 milliGRAM(s) Oral daily  oxyCODONE    5 mG/acetaminophen 325 mG 1 Tablet(s) Oral every 8 hours PRN  potassium chloride    Tablet ER 10 milliEquivalent(s) Oral daily  predniSONE   Tablet 10 milliGRAM(s) Oral daily      FAMILY HISTORY:      SOCIAL HISTORY:  Smoking:   no  ETOH:no     Drug Use: no  Single     T(F): 98.7 (10-08-18 @ 12:34), Max: 101.7 (10-08-18 @ 10:05)  HR: 81 (10-08-18 @ 12:34)  BP: 101/66 (10-08-18 @ 12:34)  RR: 16 (10-08-18 @ 12:34)  SpO2: 96% (10-08-18 @ 12:34)  Wt(kg): --    PHYSICAL EXAM:  General: alert, no acute distress  Eyes:  anicteric, no conjunctival injection, no discharge  Oropharynx: no lesions or injection 	  Neck: supple, without adenopathy  Lungs: clear to auscultation  Heart: regular rate and rhythm; no murmur, rubs or gallops  Abdomen: soft, nondistended, nontender, without mass or organomegaly  Skin: suprapubic/groin region with area of erythema and induration on left side of body.No fluctuance  Extremities: no clubbing, cyanosis, or edema  Neurologic: alert, oriented, moves all extremities    LAB RESULTS:                        11.7   16.35 )-----------( 192      ( 08 Oct 2018 08:03 )             36.3     10-08    131<L>  |  97  |  17  ----------------------------<  114<H>  4.2   |  27  |  0.85    Ca    7.9<L>      08 Oct 2018 06:40  Mg     1.9     10-08    TPro  7.8  /  Alb  3.4  /  TBili  1.7<H>  /  DBili  x   /  AST  47<H>  /  ALT  55<H>  /  AlkPhos  72  10-07    LIVER FUNCTIONS - ( 07 Oct 2018 14:09 )  Alb: 3.4 g/dL / Pro: 7.8 g/dL / ALK PHOS: 72 U/L / ALT: 55 U/L / AST: 47 U/L / GGT: x               MICROBIOLOGY:  RECENT CULTURES:        RADIOLOGY REVIEWED:  < from: CT Abdomen and Pelvis w/ IV Cont (10.07.18 @ 17:12) >  IMPRESSION:     Deep venous thrombosis involving the left common femoral vein. Inferior   extent of the thrombus is not visualized. Questionable involvement of the   left great saphenous vein.    Inflammatory changes of the left inguinal soft tissues extending into the   left perineum. No drainable fluid collection.      < from: [facility] Duplex Lower Ext Vein Scan, Left (10.07.18 @ 20:16) >  IMPRESSION:     No evidence of left lower extremity deep venous thrombosis    < end of copied text >

## 2018-10-08 NOTE — CONSULT NOTE ADULT - SUBJECTIVE AND OBJECTIVE BOX
HPI:  72 yo female with pmh of  HTN, gerd,, s/p left breast lumpectomy followed by RT in 2009 for very early stage breast cancer, no hormonal therapy,  newly diagnosed with Ulcerative colitis, had  colonoscopy 3  weeks ago admitted  with left groin pain, abdominal pain and fever.  She was initially thought to have left DVT but doppler was negative. No known personal or family h/o clotting disorded.   PAST MEDICAL & SURGICAL HISTORY:  Colitis  left breast cancer 2009  Meds:  acetaminophen   Tablet .. 650 milliGRAM(s) Oral every 6 hours PRN Temp greater or equal to 38C (100.4F), Mild Pain (1 - 3)  aspirin enteric coated 81 milliGRAM(s) Oral daily  atorvastatin 10 milliGRAM(s) Oral at bedtime  influenza   Vaccine 0.5 milliLiter(s) IntraMuscular once  mesalamine ER Capsule 1000 milliGRAM(s) Oral daily  metoprolol succinate ER 25 milliGRAM(s) Oral daily  oxyCODONE    5 mG/acetaminophen 325 mG 1 Tablet(s) Oral every 8 hours PRN Moderate Pain (4 - 6)  piperacillin/tazobactam IVPB. 3.375 Gram(s) IV Intermittent every 8 hours  potassium chloride    Tablet ER 10 milliEquivalent(s) Oral daily  predniSONE   Tablet 10 milliGRAM(s) Oral daily    Labs:  CBC Full  -  ( 08 Oct 2018 08:03 )  WBC Count : 16.35 K/uL  Hemoglobin : 11.7 g/dL  Hematocrit : 36.3 %  Platelet Count - Automated : 192 K/uL  Mean Cell Volume : 79.3 fl  Mean Cell Hemoglobin : 25.5 pg  Mean Cell Hemoglobin Concentration : 32.2 gm/dL  Auto Neutrophil # : x  Auto Lymphocyte # : x  Auto Monocyte # : x  Auto Eosinophil # : x  Auto Basophil # : x  Auto Neutrophil % : x  Auto Lymphocyte % : x  Auto Monocyte % : x  Auto Eosinophil % : x  Auto Basophil % : x    10-08    131<L>  |  97  |  17  ----------------------------<  114<H>  4.2   |  27  |  0.85    Ca    7.9<L>      08 Oct 2018 06:40  Mg     1.9     10-08    TPro  7.8  /  Alb  3.4  /  TBili  1.7<H>  /  DBili  x   /  AST  47<H>  /  ALT  55<H>  /  AlkPhos  72  10-07      Radiology:     < from: CT Abdomen and Pelvis w/ IV Cont (10.07.18 @ 17:12) >  Deep venous thrombosis involving the left common femoral vein. Inferior   extent of the thrombus is not visualized. Questionable involvement of the   left great saphenous vein.    Inflammatory changes of the left inguinal soft tissues extending into the   left perineum. No drainable fluid collection.      < end of copied text >    < from: [facility] Duplex Lower Ext Vein Scan, Left (10.07.18 @ 20:16) >  here is normal compressibility of the left common femoral, femoral and   popliteal veins. No calf vein thrombosis is detected.    The contralateral common femoral vein is patent.    Doppler examination shows normal spontaneous and phasic flow.    IMPRESSION:     No evidence of left lower extremity deep venous thrombosis.      < end of copied text >        ROS:  No pain and lump left gooin  No lumps in neck or pain  No Sob or chest pain  No palpitations   No abdominal pain. No change in bowel habit. No blood in stools  No weakness in extremities  No leg swelling      Vital Signs Last 24 Hrs  T(C): 37.1 (08 Oct 2018 10:35), Max: 38.7 (08 Oct 2018 10:05)  T(F): 98.8 (08 Oct 2018 10:35), Max: 101.7 (08 Oct 2018 10:05)  HR: 82 (08 Oct 2018 10:05) (70 - 82)  BP: 120/78 (08 Oct 2018 10:05) (102/60 - 145/78)  BP(mean): --  RR: 18 (08 Oct 2018 10:05) (16 - 20)  SpO2: 97% (08 Oct 2018 10:05) (97% - 98%)    Physical Exam:  Patient comfortable  AXOX3, evidence of left lumpectomy  Neck supple, no LN's  Chest: bilateral breath sounds, no wheeze or rales  CVS: regular heart rate without murmur  Abdomen: soft, BS+, no massess or organomegaly.  Left pubic and suprapubic area of cellulitis has been marked, has reddness  CNS: no gross deficit  Ext: no edema

## 2018-10-08 NOTE — PROGRESS NOTE ADULT - ASSESSMENT
pt  admitted  with  h/o abd  pain/ / left  groin pain       , with elevated wbc/  hyponatremia/  dehydration   ct abdomen, noted, with  ? dvt. saphenous  vein    doppler legs, no dvt    elevated wbc, from  steroid,  and from  cellulitis/ ?  abscess supra pubic area   surg  eval , noted  on zosyn/    ct with no colitis,  will taper  prednisone  ID  eval    < from: CT Abdomen and Pelvis w/ IV Cont (10.07.18 @ 17:12) >    IMPRESSION:     Deep venous thrombosis involving the left common femoral vein. Inferior   extent of the thrombus is not visualized. Questionable involvement of the   left great saphenous vein.    Inflammatory changes of the left inguinal soft tissues extending into the   left perineum. No drainable fluid collection.    Dr. Chanel discussed these findings with Dr. Dao on 10/7/2018 5:48 PM

## 2018-10-08 NOTE — CONSULT NOTE ADULT - ASSESSMENT
74 yo female with pmh of  HTN, gerd,, s/p left breast lumpectomy followed by RT in 2009 for very early stage breast cancer, no hormonal therapy,  newly diagnosed with Ulcerative colitis, had  colonoscopy 3  weeks ago admitted  with left groin pain, abdominal pain and fever. She is on prednisone taper and mesalamine  She was initially thought to have left DVT but doppler was negative. No known personal or family h/o clotting disorded.   Regarding breast cancer, mammograms have been OK, has no evidence of disease and prognosis is good considering very early stage disease.   At present she is being managed as cellulitis with close observation, surgery saw her.  Even though there is no clot at present and family history and p/h of clot is negative, given her h/o of ulcerative colitis, local cellulitis, need to give her adequate thromboprophylaxis and follow carefully for any DVT etc.  WBC increase from infection and steroids

## 2018-10-08 NOTE — PROGRESS NOTE ADULT - SUBJECTIVE AND OBJECTIVE BOX
in er   no cp/sob    REVIEW OF SYSTEMS:  GEN: no fever,    no chills  RESP: no SOB,   no cough  CVS: no chest pain,   no palpitations  GI: no abdominal pain,   no nausea,   no vomiting,   no constipation,   no diarrhea  : no dysuria,   no frequency  NEURO: no headache,   no dizziness  PSYCH: no depression,   not anxious  Derm : no rash    MEDICATIONS  (STANDING):  aspirin enteric coated 81 milliGRAM(s) Oral daily  atorvastatin 10 milliGRAM(s) Oral at bedtime  influenza   Vaccine 0.5 milliLiter(s) IntraMuscular once  mesalamine ER Capsule 1000 milliGRAM(s) Oral daily  metoprolol succinate ER 25 milliGRAM(s) Oral daily  piperacillin/tazobactam IVPB. 3.375 Gram(s) IV Intermittent every 8 hours  potassium chloride    Tablet ER 10 milliEquivalent(s) Oral daily  predniSONE   Tablet 10 milliGRAM(s) Oral daily    MEDICATIONS  (PRN):  oxyCODONE    5 mG/acetaminophen 325 mG 1 Tablet(s) Oral every 8 hours PRN Moderate Pain (4 - 6)      Vital Signs Last 24 Hrs  T(C): 37.1 (08 Oct 2018 04:50), Max: 38.2 (07 Oct 2018 11:44)  T(F): 98.8 (08 Oct 2018 04:50), Max: 100.7 (07 Oct 2018 11:44)  HR: 70 (08 Oct 2018 04:50) (70 - 77)  BP: 123/81 (08 Oct 2018 04:50) (102/60 - 145/78)  BP(mean): --  RR: 16 (08 Oct 2018 04:50) (16 - 20)  SpO2: 97% (08 Oct 2018 04:50) (97% - 98%)  CAPILLARY BLOOD GLUCOSE        I&O's Summary      PHYSICAL EXAM:  HEAD:  Atraumatic, Normocephalic  NECK: Supple, No   JVD  CHEST/LUNG:   no     rales,     no,    rhonchi  HEART: Regular rate and rhythm;         murmur  ABDOMEN: Soft, Nontender, ;   EXTREMITIES:    no   pedal   edema  mons  pubis  with swelling/ redness/  tender on palpation  NEUROLOGY:  alert    LABS:                        13.7   17.9  )-----------( 191      ( 07 Oct 2018 14:09 )             43.1     10-07    129<L>  |  95<L>  |  19  ----------------------------<  122<H>  3.0<L>   |  25  |  0.85    Ca    8.0<L>      07 Oct 2018 23:54    TPro  7.8  /  Alb  3.4  /  TBili  1.7<H>  /  DBili  x   /  AST  47<H>  /  ALT  55<H>  /  AlkPhos  72  10-07    PT/INR - ( 07 Oct 2018 18:39 )   PT: 13.3 sec;   INR: 1.23 ratio         PTT - ( 07 Oct 2018 18:39 )  PTT:28.7 sec            10-07 @ 22:35  2.9  62              Consultant(s) Notes Reviewed:      Care Discussed with Consultants/Other Providers:

## 2018-10-08 NOTE — PROGRESS NOTE ADULT - SUBJECTIVE AND OBJECTIVE BOX
General Surgery Progress Note    SUBJECTIVE:  The patient was seen and examined. No acute events overnight. C/o L groin pain. Denies N/V, afebrile.     OBJECTIVE:     ** VITAL SIGNS / I&O's **    Vital Signs Last 24 Hrs  T(C): 37.1 (08 Oct 2018 04:50), Max: 38.2 (07 Oct 2018 11:44)  T(F): 98.8 (08 Oct 2018 04:50), Max: 100.7 (07 Oct 2018 11:44)  HR: 70 (08 Oct 2018 04:50) (70 - 77)  BP: 123/81 (08 Oct 2018 04:50) (102/60 - 145/78)  BP(mean): --  RR: 16 (08 Oct 2018 04:50) (16 - 20)  SpO2: 97% (08 Oct 2018 04:50) (97% - 98%)        ** PHYSICAL EXAM **    -- CONSTITUTIONAL: Alert, NAD  -- PULMONARY: non-labored respirations  -- ABDOMEN: soft, non-distended, non-tender  -- EXT: Left groin and suprapubic area with extensive erythema, induration and warmth wrapping to left flank, no crepitus, no bullae, no fluctuance or drainage, minimally tender, no evidence of hernia  -- NEURO: A&Ox3    ** LABS **                          13.7   17.9  )-----------( 191      ( 07 Oct 2018 14:09 )             43.1     07 Oct 2018 23:54    129    |  95     |  19     ----------------------------<  122    3.0     |  25     |  0.85     Ca    8.0        07 Oct 2018 23:54    TPro  7.8    /  Alb  3.4    /  TBili  1.7    /  DBili  x      /  AST  47     /  ALT  55     /  AlkPhos  72     07 Oct 2018 14:09    PT/INR - ( 07 Oct 2018 18:39 )   PT: 13.3 sec;   INR: 1.23 ratio         PTT - ( 07 Oct 2018 18:39 )  PTT:28.7 sec  CAPILLARY BLOOD GLUCOSE            LIVER FUNCTIONS - ( 07 Oct 2018 14:09 )  Alb: 3.4 g/dL / Pro: 7.8 g/dL / ALK PHOS: 72 U/L / ALT: 55 U/L / AST: 47 U/L / GGT: x                 MEDICATIONS  (STANDING):  aspirin enteric coated 81 milliGRAM(s) Oral daily  atorvastatin 10 milliGRAM(s) Oral at bedtime  enoxaparin Injectable 90 milliGRAM(s) SubCutaneous two times a day  influenza   Vaccine 0.5 milliLiter(s) IntraMuscular once  mesalamine ER Capsule 1000 milliGRAM(s) Oral daily  metoprolol succinate ER 25 milliGRAM(s) Oral daily  piperacillin/tazobactam IVPB. 3.375 Gram(s) IV Intermittent every 8 hours  potassium chloride    Tablet ER 10 milliEquivalent(s) Oral daily  predniSONE   Tablet 10 milliGRAM(s) Oral daily    MEDICATIONS  (PRN):  oxyCODONE    5 mG/acetaminophen 325 mG 1 Tablet(s) Oral every 8 hours PRN Moderate Pain (4 - 6)

## 2018-10-09 LAB
ANION GAP SERPL CALC-SCNC: 13 MMOL/L — SIGNIFICANT CHANGE UP (ref 5–17)
BUN SERPL-MCNC: 16 MG/DL — SIGNIFICANT CHANGE UP (ref 7–23)
CALCIUM SERPL-MCNC: 8.4 MG/DL — SIGNIFICANT CHANGE UP (ref 8.4–10.5)
CHLORIDE SERPL-SCNC: 94 MMOL/L — LOW (ref 96–108)
CO2 SERPL-SCNC: 22 MMOL/L — SIGNIFICANT CHANGE UP (ref 22–31)
CREAT SERPL-MCNC: 0.95 MG/DL — SIGNIFICANT CHANGE UP (ref 0.5–1.3)
GLUCOSE SERPL-MCNC: 124 MG/DL — HIGH (ref 70–99)
HCT VFR BLD CALC: 38.3 % — SIGNIFICANT CHANGE UP (ref 34.5–45)
HGB BLD-MCNC: 12.3 G/DL — SIGNIFICANT CHANGE UP (ref 11.5–15.5)
MCHC RBC-ENTMCNC: 25.8 PG — LOW (ref 27–34)
MCHC RBC-ENTMCNC: 32 GM/DL — SIGNIFICANT CHANGE UP (ref 32–36)
MCV RBC AUTO: 80.7 FL — SIGNIFICANT CHANGE UP (ref 80–100)
PLATELET # BLD AUTO: 156 K/UL — SIGNIFICANT CHANGE UP (ref 150–400)
POTASSIUM SERPL-MCNC: 3.9 MMOL/L — SIGNIFICANT CHANGE UP (ref 3.5–5.3)
POTASSIUM SERPL-SCNC: 3.9 MMOL/L — SIGNIFICANT CHANGE UP (ref 3.5–5.3)
RBC # BLD: 4.75 M/UL — SIGNIFICANT CHANGE UP (ref 3.8–5.2)
RBC # FLD: 14.9 % — HIGH (ref 10.3–14.5)
SODIUM SERPL-SCNC: 129 MMOL/L — LOW (ref 135–145)
WBC # BLD: 11.8 K/UL — HIGH (ref 3.8–10.5)
WBC # FLD AUTO: 11.8 K/UL — HIGH (ref 3.8–10.5)

## 2018-10-09 PROCEDURE — 93306 TTE W/DOPPLER COMPLETE: CPT | Mod: 26

## 2018-10-09 RX ORDER — VANCOMYCIN HCL 1 G
1000 VIAL (EA) INTRAVENOUS EVERY 24 HOURS
Qty: 0 | Refills: 0 | Status: DISCONTINUED | OUTPATIENT
Start: 2018-10-09 | End: 2018-10-11

## 2018-10-09 RX ORDER — SODIUM CHLORIDE 9 MG/ML
1000 INJECTION INTRAMUSCULAR; INTRAVENOUS; SUBCUTANEOUS
Qty: 0 | Refills: 0 | Status: DISCONTINUED | OUTPATIENT
Start: 2018-10-09 | End: 2018-10-10

## 2018-10-09 RX ORDER — FAMOTIDINE 10 MG/ML
20 INJECTION INTRAVENOUS ONCE
Qty: 0 | Refills: 0 | Status: COMPLETED | OUTPATIENT
Start: 2018-10-09 | End: 2018-10-09

## 2018-10-09 RX ORDER — HEPARIN SODIUM 5000 [USP'U]/ML
5000 INJECTION INTRAVENOUS; SUBCUTANEOUS EVERY 12 HOURS
Qty: 0 | Refills: 0 | Status: DISCONTINUED | OUTPATIENT
Start: 2018-10-09 | End: 2018-10-16

## 2018-10-09 RX ADMIN — ATORVASTATIN CALCIUM 10 MILLIGRAM(S): 80 TABLET, FILM COATED ORAL at 21:08

## 2018-10-09 RX ADMIN — Medication 1.5 GRAM(S): at 11:30

## 2018-10-09 RX ADMIN — Medication 10 MILLIGRAM(S): at 05:01

## 2018-10-09 RX ADMIN — FAMOTIDINE 20 MILLIGRAM(S): 10 INJECTION INTRAVENOUS at 21:08

## 2018-10-09 RX ADMIN — Medication 250 MILLIGRAM(S): at 18:34

## 2018-10-09 RX ADMIN — Medication 1 TABLET(S): at 18:34

## 2018-10-09 RX ADMIN — Medication 10 MILLIEQUIVALENT(S): at 11:30

## 2018-10-09 RX ADMIN — PIPERACILLIN AND TAZOBACTAM 25 GRAM(S): 4; .5 INJECTION, POWDER, LYOPHILIZED, FOR SOLUTION INTRAVENOUS at 13:28

## 2018-10-09 RX ADMIN — SODIUM CHLORIDE 60 MILLILITER(S): 9 INJECTION INTRAMUSCULAR; INTRAVENOUS; SUBCUTANEOUS at 11:30

## 2018-10-09 RX ADMIN — Medication 650 MILLIGRAM(S): at 18:33

## 2018-10-09 RX ADMIN — TRAMADOL HYDROCHLORIDE 50 MILLIGRAM(S): 50 TABLET ORAL at 05:00

## 2018-10-09 RX ADMIN — Medication 1 TABLET(S): at 04:36

## 2018-10-09 RX ADMIN — HEPARIN SODIUM 5000 UNIT(S): 5000 INJECTION INTRAVENOUS; SUBCUTANEOUS at 18:33

## 2018-10-09 RX ADMIN — PIPERACILLIN AND TAZOBACTAM 25 GRAM(S): 4; .5 INJECTION, POWDER, LYOPHILIZED, FOR SOLUTION INTRAVENOUS at 05:01

## 2018-10-09 RX ADMIN — Medication 81 MILLIGRAM(S): at 11:30

## 2018-10-09 RX ADMIN — Medication 650 MILLIGRAM(S): at 19:00

## 2018-10-09 RX ADMIN — Medication 25 MILLIGRAM(S): at 05:01

## 2018-10-09 RX ADMIN — PIPERACILLIN AND TAZOBACTAM 25 GRAM(S): 4; .5 INJECTION, POWDER, LYOPHILIZED, FOR SOLUTION INTRAVENOUS at 21:08

## 2018-10-09 RX ADMIN — TRAMADOL HYDROCHLORIDE 50 MILLIGRAM(S): 50 TABLET ORAL at 04:36

## 2018-10-09 NOTE — PROGRESS NOTE ADULT - ASSESSMENT
73y female with a PMH significant for HTN, HLD, recent diagnosis of ulcerative colitis on mesalamine with prednisone taper   Admitted with left groin/suprapubic cellulitis that started on 10/03/18. Found febrile in hospital to 101.7 with leukocytosis of 16K  Her colitis has been well controlled with Prednisone  Last night with Fever again    PLAN:   Continue zosyn & vancomycin  Follow blood cx & serial CBCs  Daughter updated at bedside

## 2018-10-09 NOTE — PROGRESS NOTE ADULT - ASSESSMENT
pt  admitted  with  h/o abd  pain/ / left  groin pain       , with elevated wbc/  hyponatremia/  dehydration  pt with cellulitis,  of supra  pubic area    doppler legs, no dvt    elevated wbc, from  steroid,  and from  cellulitis/ supra pubic area   surg  eval , noted, no intervention  on zosyn/    ct with no colitis,  will   stop prednisone  ID  f/p

## 2018-10-09 NOTE — PROGRESS NOTE ADULT - SUBJECTIVE AND OBJECTIVE BOX
febrile yesterday, none  today    REVIEW OF SYSTEMS:  GEN: no fever,    no chills  RESP: no SOB,   no cough  CVS: no chest pain,   no palpitations  GI: no abdominal pain,   no nausea,   no vomiting,   no constipation,   no diarrhea  : no dysuria,   no frequency  NEURO: no headache,   no dizziness  PSYCH: no depression,   not anxious  Derm : no rash    MEDICATIONS  (STANDING):  aspirin enteric coated 81 milliGRAM(s) Oral daily  atorvastatin 10 milliGRAM(s) Oral at bedtime  calcium carbonate    500 mG (Tums) Chewable 1 Tablet(s) Chew two times a day  influenza   Vaccine 0.5 milliLiter(s) IntraMuscular once  mesalamine ER (24-Hour) Capsule 1.5 Gram(s) Oral daily  metoprolol succinate ER 25 milliGRAM(s) Oral daily  piperacillin/tazobactam IVPB. 3.375 Gram(s) IV Intermittent every 8 hours  potassium chloride    Tablet ER 10 milliEquivalent(s) Oral daily  predniSONE   Tablet 10 milliGRAM(s) Oral daily    MEDICATIONS  (PRN):  acetaminophen   Tablet .. 650 milliGRAM(s) Oral every 6 hours PRN Temp greater or equal to 38C (100.4F), Mild Pain (1 - 3)  nystatin Cream 1 Application(s) Topical two times a day PRN rash/irritation  oxyCODONE    5 mG/acetaminophen 325 mG 1 Tablet(s) Oral every 8 hours PRN Moderate Pain (4 - 6)  traMADol 50 milliGRAM(s) Oral two times a day PRN Moderate Pain (4 - 6)      Vital Signs Last 24 Hrs  T(C): 37.1 (09 Oct 2018 04:57), Max: 38.8 (08 Oct 2018 19:12)  T(F): 98.8 (09 Oct 2018 04:57), Max: 101.8 (08 Oct 2018 19:12)  HR: 101 (09 Oct 2018 04:57) (81 - 110)  BP: 135/81 (09 Oct 2018 04:57) (101/66 - 157/93)  BP(mean): --  RR: 19 (09 Oct 2018 04:57) (16 - 20)  SpO2: 97% (09 Oct 2018 04:57) (96% - 97%)  CAPILLARY BLOOD GLUCOSE        I&O's Summary    08 Oct 2018 07:01  -  09 Oct 2018 07:00  --------------------------------------------------------  IN: 680 mL / OUT: 0 mL / NET: 680 mL        PHYSICAL EXAM:  HEAD:  Atraumatic, Normocephalic  NECK: Supple, No   JVD  CHEST/LUNG:   no     rales,     no,    rhonchi  HEART: Regular rate and rhythm;         murmur  ABDOMEN: Soft, Nontender, ;   EXTREMITIES:   no     edema  supra  pubic  swelling/ redness,  decerasing  NEUROLOGY:  alert    LABS:                        11.7   16.35 )-----------( 192      ( 08 Oct 2018 08:03 )             36.3     10-09    129<L>  |  94<L>  |  16  ----------------------------<  124<H>  3.9   |  22  |  0.95    Ca    8.4      09 Oct 2018 06:00  Mg     1.9     10-08    TPro  7.8  /  Alb  3.4  /  TBili  1.7<H>  /  DBili  x   /  AST  47<H>  /  ALT  55<H>  /  AlkPhos  72  10-07    PT/INR - ( 07 Oct 2018 18:39 )   PT: 13.3 sec;   INR: 1.23 ratio         PTT - ( 07 Oct 2018 18:39 )  PTT:28.7 sec            10-07 @ 22:35  2.9  62              Consultant(s) Notes Reviewed:      Care Discussed with Consultants/Other Providers:

## 2018-10-09 NOTE — PROGRESS NOTE ADULT - ASSESSMENT
A: 73F w/recent dx of ulcerative colitis on prednisone and mesalamine p/w 4-day h/o left groin pain, swelling, erythema.    P:   - no acute surgical intervention  - monitor for signs of infection d/t immunocompromised state (on prednisone for UC), low susp for nec infection given exam  - LRINEC score = 7, CRP 27.5  - recommend IV fluid resuscitation and broad spectrum abx including gram positive/MRSA coverage- currently on zosyn  - border of erythema marked with pen, monitor for extension/regression on IV abx    Sangita Lenz, PGY-1  General Surgery Green Team [phone]

## 2018-10-09 NOTE — PROGRESS NOTE ADULT - SUBJECTIVE AND OBJECTIVE BOX
CC: f/u for left groin cellulitis     Patient reports pain on walking and touching that area     REVIEW OF SYSTEMS:  All other review of systems negative (Comprehensive ROS) except as above     Antimicrobials Day #  : 2  piperacillin/tazobactam IVPB. 3.375 Gram(s) IV Intermittent every 8 hours  Vancomycin 1 gm daily    T(F): 98.8 (10-09-18 @ 04:57), Max: 101.8 (10-08-18 @ 19:12)  HR: 101 (10-09-18 @ 04:57)  BP: 135/81 (10-09-18 @ 04:57)  RR: 19 (10-09-18 @ 04:57)  SpO2: 97% (10-09-18 @ 04:57)  Wt(kg): --    PHYSICAL EXAM:  General: alert, no acute distress  Eyes:  anicteric, no conjunctival injection, no discharge  Oropharynx: no lesions or injection 	  Neck: supple, without adenopathy  Lungs: clear to auscultation  Heart: regular rate and rhythm; no murmur  Abdomen: soft, nondistended, nontender  Skin: left groin with erythema, warmth, TTP & edema extending to the suprapubic area   Extremities: no clubbing, cyanosis, or edema  Neurologic: alert, oriented, moves all extremities    LAB RESULTS:                        12.3   11.8  )-----------( 156      ( 09 Oct 2018 06:00 )             38.3     10-09    129<L>  |  94<L>  |  16  ----------------------------<  124<H>  3.9   |  22  |  0.95    Ca    8.4      09 Oct 2018 06:00  Mg     1.9     10-08    TPro  7.8  /  Alb  3.4  /  TBili  1.7<H>  /  DBili  x   /  AST  47<H>  /  ALT  55<H>  /  AlkPhos  72  10-07    LIVER FUNCTIONS - ( 07 Oct 2018 14:09 )  Alb: 3.4 g/dL / Pro: 7.8 g/dL / ALK PHOS: 72 U/L / ALT: 55 U/L / AST: 47 U/L / GGT: x             MICROBIOLOGY:  RECENT CULTURES:  10-07 @ 15:19 .Blood Blood-Venous     No growth to date.      RADIOLOGY REVIEWED:

## 2018-10-09 NOTE — PROGRESS NOTE ADULT - SUBJECTIVE AND OBJECTIVE BOX
- Patient seen and examined.  - In summary, patient is a 73 year old woman who presented with LEFT GROIN PAIN (08 Oct 2018 09:58)  - Today, patient is without complaints.         *****MEDICATIONS:    MEDICATIONS  (STANDING):  aspirin enteric coated 81 milliGRAM(s) Oral daily  atorvastatin 10 milliGRAM(s) Oral at bedtime  calcium carbonate    500 mG (Tums) Chewable 1 Tablet(s) Chew two times a day  influenza   Vaccine 0.5 milliLiter(s) IntraMuscular once  mesalamine ER (24-Hour) Capsule 1.5 Gram(s) Oral daily  metoprolol succinate ER 25 milliGRAM(s) Oral daily  piperacillin/tazobactam IVPB. 3.375 Gram(s) IV Intermittent every 8 hours  potassium chloride    Tablet ER 10 milliEquivalent(s) Oral daily  sodium chloride 0.9%. 1000 milliLiter(s) (60 mL/Hr) IV Continuous <Continuous>    MEDICATIONS  (PRN):  acetaminophen   Tablet .. 650 milliGRAM(s) Oral every 6 hours PRN Temp greater or equal to 38C (100.4F), Mild Pain (1 - 3)  nystatin Cream 1 Application(s) Topical two times a day PRN rash/irritation  traMADol 50 milliGRAM(s) Oral two times a day PRN Moderate Pain (4 - 6)             ***** REVIEW OF SYSTEM:  GEN: no fever, no chills, no pain  RESP: no SOB, no cough, no sputum  CVS: no chest pain, no palpitations, no edema  GI: no abdominal pain, no nausea, no vomiting, no constipation, no diarrhea  : no dysuria, no frequency  NEURO: no headache, no dizziness  PSYCH: no depression, not anxious  Derm : no itching, no rash         ***** VITAL SIGNS:    T(F): 98.8 (10-09-18 @ 04:57), Max: 101.8 (10-08-18 @ 19:12)  HR: 101 (10-09-18 @ 04:57) (81 - 110)  BP: 135/81 (10-09-18 @ 04:57) (101/66 - 157/93)  RR: 19 (10-09-18 @ 04:57) (16 - 20)  SpO2: 97% (10-09-18 @ 04:57) (96% - 97%)  Wt(kg): --  ,   I&O's Summary    08 Oct 2018 07:01  -  09 Oct 2018 07:00  --------------------------------------------------------  IN: 680 mL / OUT: 0 mL / NET: 680 mL                 *****PHYSICAL EXAM:  GEN: A&O X 3 , NAD , comfortable  HEENT: NCAT, EOMI, MMM, no icterus  NECK: Supple, No JVD  CVS: S1S2 , regular , No M/R/G appreciated  PULM: CTA B/L,  no W/R/R appreciated  ABD.: soft. non tender, non distended,  bowel sounds present  Extrem: intact pulses , no edema noted  Derm: No rash or ecchymosis noted  PSYCH: normal mood, no depression, not anxious         *****LAB AND IMAGIN.3   11.8  )-----------( 156      ( 09 Oct 2018 06:00 )             38.3               10-    129<L>  |  94<L>  |  16  ----------------------------<  124<H>  3.9   |  22  |  0.95    Ca    8.4      09 Oct 2018 06:00  Mg     1.9     10-08    TPro  7.8  /  Alb  3.4  /  TBili  1.7<H>  /  DBili  x   /  AST  47<H>  /  ALT  55<H>  /  AlkPhos  72  10-07    PT/INR - ( 07 Oct 2018 18:39 )   PT: 13.3 sec;   INR: 1.23 ratio    PTT - ( 07 Oct 2018 18:39 )  PTT:28.7 sec                   [All pertinent recent Imaging/Reports reviewed]         *****A S S E S S M E N T   A N D   P L A N :  73F with fever, pubic area cellulitis  no  dvt on LE doppler  echo pending  abx per ID  surg f/u noted  continue current meds    __________________________  GO Gerber D.O.

## 2018-10-09 NOTE — PROGRESS NOTE ADULT - ASSESSMENT
74 yo female with pmh of  HTN, gerd,, s/p left breast lumpectomy followed by RT in 2009 for very early stage breast cancer, no hormonal therapy,  newly diagnosed with Ulcerative colitis, had  colonoscopy 3  weeks ago admitted  with left groin pain, abdominal pain and fever. She was on prednisone taper and mesalamine  She was initially thought to have left DVT but doppler was negative. No known personal or family h/o clotting disorder.   Regarding breast cancer, mammograms have been OK, has no evidence of disease and prognosis is good considering very early stage disease.   At present she is being managed as cellulitis with close observation, surgery saw her. Cellulitis is clinically improving  Even though there is no clot at present and family history and p/h of clot is negative, given her h/o of ulcerative colitis, local cellulitis, thromboprophylaxis if patient not ambulating much. WBC increase from infection and steroids is decreasing.

## 2018-10-09 NOTE — PROGRESS NOTE ADULT - SUBJECTIVE AND OBJECTIVE BOX
72 yo female with pmh of  HTN, gerd,, s/p left breast lumpectomy followed by RT in 2009 for very early stage breast cancer, no hormonal therapy,  newly diagnosed with Ulcerative colitis, had  colonoscopy 3  weeks ago admitted  with left groin pain, abdominal pain and fever.  She was initially thought to have left DVT but doppler was negative. No known personal or family h/o clotting disorder.   PAST MEDICAL & SURGICAL HISTORY:  Colitis  left breast cancer 2009  Meds:  acetaminophen   Tablet .. 650 milliGRAM(s) Oral every 6 hours PRN Temp greater or equal to 38C (100.4F), Mild Blayne  PAST MEDICAL & SURGICAL HISTORY:  Colitis    Medications:  acetaminophen   Tablet .. 650 milliGRAM(s) Oral every 6 hours PRN Temp greater or equal to 38C (100.4F), Mild Pain (1 - 3)  aspirin enteric coated 81 milliGRAM(s) Oral daily  atorvastatin 10 milliGRAM(s) Oral at bedtime  calcium carbonate    500 mG (Tums) Chewable 1 Tablet(s) Chew two times a day  influenza   Vaccine 0.5 milliLiter(s) IntraMuscular once  mesalamine ER (24-Hour) Capsule 1.5 Gram(s) Oral daily  metoprolol succinate ER 25 milliGRAM(s) Oral daily  nystatin Cream 1 Application(s) Topical two times a day PRN rash/irritation  piperacillin/tazobactam IVPB. 3.375 Gram(s) IV Intermittent every 8 hours  potassium chloride    Tablet ER 10 milliEquivalent(s) Oral daily  sodium chloride 0.9%. 1000 milliLiter(s) IV Continuous <Continuous>  traMADol 50 milliGRAM(s) Oral two times a day PRN Moderate Pain (4 - 6)    Labs:  CBC Full  -  ( 09 Oct 2018 06:00 )  WBC Count : 11.8 K/uL  Hemoglobin : 12.3 g/dL  Hematocrit : 38.3 %  Platelet Count - Automated : 156 K/uL  Mean Cell Volume : 80.7 fl  Mean Cell Hemoglobin : 25.8 pg  Mean Cell Hemoglobin Concentration : 32.0 gm/dL  Auto Neutrophil # : x  Auto Lymphocyte # : x  Auto Monocyte # : x  Auto Eosinophil # : x  Auto Basophil # : x  Auto Neutrophil % : x  Auto Lymphocyte % : x  Auto Monocyte % : x  Auto Eosinophil % : x  Auto Basophil % : x    10-09    129<L>  |  94<L>  |  16  ----------------------------<  124<H>  3.9   |  22  |  0.95    Ca    8.4      09 Oct 2018 06:00  Mg     1.9     10-08    TPro  7.8  /  Alb  3.4  /  TBili  1.7<H>  /  DBili  x   /  AST  47<H>  /  ALT  55<H>  /  AlkPhos  72  10-07      Radiology:             ROS:  Patient comfortable without distress  No SOB or chest pain  No palpitation  No abdominal pain, diarrhaea or constipation  No weakness of extremities  No skin changes or swelling of legs  Pubic area swelling less    Vital Signs Last 24 Hrs  T(C): 37.1 (09 Oct 2018 04:57), Max: 38.8 (08 Oct 2018 19:12)  T(F): 98.8 (09 Oct 2018 04:57), Max: 101.8 (08 Oct 2018 19:12)  HR: 101 (09 Oct 2018 04:57) (81 - 110)  BP: 135/81 (09 Oct 2018 04:57) (101/66 - 157/93)  BP(mean): --  RR: 19 (09 Oct 2018 04:57) (16 - 20)  SpO2: 97% (09 Oct 2018 04:57) (96% - 97%)    Physical exam:  Patient alert and oriented  No distress  CVS: S1, S2 regular or murmur  Chest: bilateral breath sound without rales  Abdomen: soft, not tender, no organomegaly or masses. Pubic area cellulitis decreasing  No focal neuro deficit  No edema      Assessment and Plan:

## 2018-10-10 LAB
ANION GAP SERPL CALC-SCNC: 12 MMOL/L — SIGNIFICANT CHANGE UP (ref 5–17)
BUN SERPL-MCNC: 13 MG/DL — SIGNIFICANT CHANGE UP (ref 7–23)
CALCIUM SERPL-MCNC: 8.3 MG/DL — LOW (ref 8.4–10.5)
CHLORIDE SERPL-SCNC: 97 MMOL/L — SIGNIFICANT CHANGE UP (ref 96–108)
CO2 SERPL-SCNC: 23 MMOL/L — SIGNIFICANT CHANGE UP (ref 22–31)
CREAT SERPL-MCNC: 0.97 MG/DL — SIGNIFICANT CHANGE UP (ref 0.5–1.3)
GLUCOSE SERPL-MCNC: 103 MG/DL — HIGH (ref 70–99)
POTASSIUM SERPL-MCNC: 3.5 MMOL/L — SIGNIFICANT CHANGE UP (ref 3.5–5.3)
POTASSIUM SERPL-SCNC: 3.5 MMOL/L — SIGNIFICANT CHANGE UP (ref 3.5–5.3)
SODIUM SERPL-SCNC: 132 MMOL/L — LOW (ref 135–145)
VANCOMYCIN TROUGH SERPL-MCNC: 4 UG/ML — LOW (ref 10–20)

## 2018-10-10 RX ORDER — CALCIUM CARBONATE 500(1250)
1 TABLET ORAL ONCE
Qty: 0 | Refills: 0 | Status: COMPLETED | OUTPATIENT
Start: 2018-10-10 | End: 2018-10-10

## 2018-10-10 RX ADMIN — Medication 1.5 GRAM(S): at 12:15

## 2018-10-10 RX ADMIN — HEPARIN SODIUM 5000 UNIT(S): 5000 INJECTION INTRAVENOUS; SUBCUTANEOUS at 17:22

## 2018-10-10 RX ADMIN — Medication 650 MILLIGRAM(S): at 21:49

## 2018-10-10 RX ADMIN — Medication 10 MILLIEQUIVALENT(S): at 12:14

## 2018-10-10 RX ADMIN — Medication 81 MILLIGRAM(S): at 12:14

## 2018-10-10 RX ADMIN — Medication 650 MILLIGRAM(S): at 22:23

## 2018-10-10 RX ADMIN — Medication 250 MILLIGRAM(S): at 20:20

## 2018-10-10 RX ADMIN — PIPERACILLIN AND TAZOBACTAM 25 GRAM(S): 4; .5 INJECTION, POWDER, LYOPHILIZED, FOR SOLUTION INTRAVENOUS at 21:50

## 2018-10-10 RX ADMIN — PIPERACILLIN AND TAZOBACTAM 25 GRAM(S): 4; .5 INJECTION, POWDER, LYOPHILIZED, FOR SOLUTION INTRAVENOUS at 13:28

## 2018-10-10 RX ADMIN — HEPARIN SODIUM 5000 UNIT(S): 5000 INJECTION INTRAVENOUS; SUBCUTANEOUS at 05:19

## 2018-10-10 RX ADMIN — Medication 650 MILLIGRAM(S): at 15:20

## 2018-10-10 RX ADMIN — ATORVASTATIN CALCIUM 10 MILLIGRAM(S): 80 TABLET, FILM COATED ORAL at 21:51

## 2018-10-10 RX ADMIN — Medication 25 MILLIGRAM(S): at 05:19

## 2018-10-10 RX ADMIN — Medication 650 MILLIGRAM(S): at 09:12

## 2018-10-10 RX ADMIN — Medication 650 MILLIGRAM(S): at 08:12

## 2018-10-10 RX ADMIN — Medication 650 MILLIGRAM(S): at 16:20

## 2018-10-10 RX ADMIN — PIPERACILLIN AND TAZOBACTAM 25 GRAM(S): 4; .5 INJECTION, POWDER, LYOPHILIZED, FOR SOLUTION INTRAVENOUS at 05:19

## 2018-10-10 RX ADMIN — Medication 1 TABLET(S): at 05:19

## 2018-10-10 RX ADMIN — Medication 1 TABLET(S): at 14:11

## 2018-10-10 NOTE — PROGRESS NOTE ADULT - SUBJECTIVE AND OBJECTIVE BOX
- Patient seen and examined.  - In summary, patient is a 73 year old woman who presented with LEFT GROIN PAIN (08 Oct 2018 09:58)  - Today, patient is without complaints.         *****MEDICATIONS:    MEDICATIONS  (STANDING):  aspirin enteric coated 81 milliGRAM(s) Oral daily  atorvastatin 10 milliGRAM(s) Oral at bedtime  calcium carbonate    500 mG (Tums) Chewable 1 Tablet(s) Chew two times a day  heparin  Injectable 5000 Unit(s) SubCutaneous every 12 hours  influenza   Vaccine 0.5 milliLiter(s) IntraMuscular once  mesalamine ER (24-Hour) Capsule 1.5 Gram(s) Oral daily  metoprolol succinate ER 25 milliGRAM(s) Oral daily  piperacillin/tazobactam IVPB. 3.375 Gram(s) IV Intermittent every 8 hours  potassium chloride    Tablet ER 10 milliEquivalent(s) Oral daily  vancomycin  IVPB 1000 milliGRAM(s) IV Intermittent every 24 hours    MEDICATIONS  (PRN):  acetaminophen   Tablet .. 650 milliGRAM(s) Oral every 6 hours PRN Temp greater or equal to 38C (100.4F), Mild Pain (1 - 3)  nystatin Cream 1 Application(s) Topical two times a day PRN rash/irritation  traMADol 50 milliGRAM(s) Oral two times a day PRN Moderate Pain (4 - 6)               ***** REVIEW OF SYSTEM:  GEN: no fever, no chills, no pain  RESP: no SOB, no cough, no sputum  CVS: no chest pain, no palpitations, no edema  GI: no abdominal pain, no nausea, no vomiting, no constipation, no diarrhea  : no dysuria, no frequency  NEURO: no headache, no dizziness  PSYCH: no depression, not anxious  Derm : no itching, no rash         ***** VITAL SIGNS:    T(F): 99.8 (10-10-18 @ 05:06), Max: 99.8 (10-10-18 @ 05:06)  HR: 79 (10-10-18 @ 05:06) (79 - 92)  BP: 129/81 (10-10-18 @ 05:06) (110/70 - 129/81)  RR: 18 (10-10-18 @ 05:06) (18 - 20)  SpO2: 95% (10-10-18 @ 05:06) (95% - 99%)  Wt(kg): --  ,   I&O's Summary    09 Oct 2018 07:01  -  10 Oct 2018 07:00  --------------------------------------------------------  IN: 320 mL / OUT: 0 mL / NET: 320 mL                 *****PHYSICAL EXAM:  GEN: A&O X 3 , NAD , comfortable  HEENT: NCAT, EOMI, MMM, no icterus  NECK: Supple, No JVD  CVS: S1S2 , regular , No M/R/G appreciated  PULM: CTA B/L,  no W/R/R appreciated  ABD.: soft. non tender, non distended,  bowel sounds present  Extrem: intact pulses , no edema noted  Derm: No rash or ecchymosis noted  PSYCH: normal mood, no depression, not anxious         *****LAB AND IMAGIN.3   11.8  )-----------( 156      ( 09 Oct 2018 06:00 )             38.3               10-10    132<L>  |  97  |  13  ----------------------------<  103<H>  3.5   |  23  |  0.97    Ca    8.3<L>      10 Oct 2018 05:55    [All pertinent recent Imaging/Reports reviewed]  < from: Transthoracic Echocardiogram (10.09.18 @ 15:04) >  Conclusions:  1. Aortic valve not well visualized; appears calcified.  2. Endocardium not well visualized; grossly preserved  left  ventricular systolic function.  3. The right ventricle is not well visualized; grossly  normal right ventricular systolic function.    < end of copied text >         *****A S S E S S M E N T   A N D   P L A N :  73F with fever, pubic area cellulitis  no  dvt on LE doppler  echo noted  abx per ID  surg f/u noted  continue current meds    __________________________  GO Gerber D.O.

## 2018-10-10 NOTE — PROGRESS NOTE ADULT - ASSESSMENT
A/P 73F with left groin cellulitis, appears to be improving.  -continue with abx per ID  -no surgical intervention at this time  -call back with questions    [facility] #[id number]    Jorge Penaloza MD PGY2

## 2018-10-10 NOTE — PROGRESS NOTE ADULT - ASSESSMENT
pt  admitted  with  h/o abd  pain/ / left  groin pain       , with elevated wbc/  hyponatremia/  dehydration  pt with cellulitis,  of supra  pubic area    doppler legs, no dvt    elevated wbc, from  steroid,  and from  cellulitis/ supra pubic area  wbc  decreasing from  17,000 to 11,000   surg  eval , noted, no intervention  on zosyn/ Vanco  , defer duration to ID   ct with no colitis,  will   stop prednisone pt  admitted  with  h/o abd  pain/ / left  groin pain       , with elevated wbc/  hyponatremia/  dehydration  pt with cellulitis,  of supra  pubic area    doppler legs, no dvt    elevated wbc, from  steroid,  and from  cellulitis/ supra pubic area  wbc  decreasing from  17,000 to 11,000   surg  eval , noted, no intervention  on zosyn/ Vanco  , defer duration to ID   ct with no colitis,  will   stop prednisone  hyponatremia, stable

## 2018-10-10 NOTE — PROGRESS NOTE ADULT - SUBJECTIVE AND OBJECTIVE BOX
in bed.  no  comalints    REVIEW OF SYSTEMS:  GEN: no fever,    no chills  RESP: no SOB,   no cough  CVS: no chest pain,   no palpitations  GI: no abdominal pain,   no nausea,   no vomiting,   no constipation,   no diarrhea  : no dysuria,   no frequency  NEURO: no headache,   no dizziness  PSYCH: no depression,   not anxious  Derm : no rash    MEDICATIONS  (STANDING):  aspirin enteric coated 81 milliGRAM(s) Oral daily  atorvastatin 10 milliGRAM(s) Oral at bedtime  calcium carbonate    500 mG (Tums) Chewable 1 Tablet(s) Chew two times a day  heparin  Injectable 5000 Unit(s) SubCutaneous every 12 hours  influenza   Vaccine 0.5 milliLiter(s) IntraMuscular once  mesalamine ER (24-Hour) Capsule 1.5 Gram(s) Oral daily  metoprolol succinate ER 25 milliGRAM(s) Oral daily  piperacillin/tazobactam IVPB. 3.375 Gram(s) IV Intermittent every 8 hours  potassium chloride    Tablet ER 10 milliEquivalent(s) Oral daily  sodium chloride 0.9%. 1000 milliLiter(s) (60 mL/Hr) IV Continuous <Continuous>  vancomycin  IVPB 1000 milliGRAM(s) IV Intermittent every 24 hours    MEDICATIONS  (PRN):  acetaminophen   Tablet .. 650 milliGRAM(s) Oral every 6 hours PRN Temp greater or equal to 38C (100.4F), Mild Pain (1 - 3)  nystatin Cream 1 Application(s) Topical two times a day PRN rash/irritation  traMADol 50 milliGRAM(s) Oral two times a day PRN Moderate Pain (4 - 6)      Vital Signs Last 24 Hrs  T(C): 37.7 (10 Oct 2018 05:06), Max: 37.7 (10 Oct 2018 05:06)  T(F): 99.8 (10 Oct 2018 05:06), Max: 99.8 (10 Oct 2018 05:06)  HR: 79 (10 Oct 2018 05:06) (79 - 92)  BP: 129/81 (10 Oct 2018 05:06) (110/70 - 129/81)  BP(mean): --  RR: 18 (10 Oct 2018 05:06) (18 - 20)  SpO2: 95% (10 Oct 2018 05:06) (95% - 99%)  CAPILLARY BLOOD GLUCOSE        I&O's Summary    09 Oct 2018 07:01  -  10 Oct 2018 07:00  --------------------------------------------------------  IN: 320 mL / OUT: 0 mL / NET: 320 mL        PHYSICAL EXAM:  HEAD:  Atraumatic, Normocephalic  NECK: Supple, No   JVD  CHEST/LUNG:   no     rales,     no,    rhonchi  HEART: Regular rate and rhythm;         murmur  ABDOMEN: Soft, Nontender, ;   EXTREMITIES:  no      edema  supra pubic area,  less swelling/ redness  NEUROLOGY:  alert    LABS:                        12.3   11.8  )-----------( 156      ( 09 Oct 2018 06:00 )             38.3     10-10    132<L>  |  97  |  13  ----------------------------<  103<H>  3.5   |  23  |  0.97    Ca    8.3<L>      10 Oct 2018 05:55                              Consultant(s) Notes Reviewed:      Care Discussed with Consultants/Other Providers:

## 2018-10-10 NOTE — PROGRESS NOTE ADULT - ASSESSMENT
73y female with a PMH significant for HTN, HLD, recent diagnosis of ulcerative colitis on mesalamine with prednisone taper   Admitted with left groin/suprapubic cellulitis that started on 10/03/18. Found febrile in hospital to 101.7 with leukocytosis of 16K  Prednisone stopped on 10/09/18  Afebrile now & leukocytosis moderating     PLAN:   Continue zosyn & vancomycin  Check vanco trough in AM  Follow blood cx & serial CBCs

## 2018-10-10 NOTE — PROGRESS NOTE ADULT - SUBJECTIVE AND OBJECTIVE BOX
CC: f/u for left groin cellulitis     Patient reports ongoing groin area pain     REVIEW OF SYSTEMS:  All other review of systems negative (Comprehensive ROS)    Antimicrobials Day #  : 3  piperacillin/tazobactam IVPB. 3.375 Gram(s) IV Intermittent every 8 hours  vancomycin  IVPB 1000 milliGRAM(s) IV Intermittent every 24 hours    Other Medications Reviewed    T(F): 99.2 (10-10-18 @ 13:34), Max: 99.8 (10-10-18 @ 05:06)  HR: 79 (10-10-18 @ 05:06)  BP: 129/81 (10-10-18 @ 05:06)  RR: 18 (10-10-18 @ 05:06)  SpO2: 95% (10-10-18 @ 05:06)  Wt(kg): --    PHYSICAL EXAM:  General: alert, no acute distress  Eyes:  anicteric, no conjunctival injection, no discharge  Oropharynx: no lesions or injection 	  Neck: supple, without adenopathy  Lungs: clear to auscultation  Heart: regular rate and rhythm; no murmur  Abdomen: soft, nondistended, nontender  Skin: left groin with erythema, warmth, TTP & edema involving the pubic area but receding from the initially drawn margins  Extremities: no clubbing, cyanosis, or edema  Neurologic: alert, oriented, moves all extremities    LAB RESULTS:                        12.3   11.8  )-----------( 156      ( 09 Oct 2018 06:00 )             38.3     10-10    132<L>  |  97  |  13  ----------------------------<  103<H>  3.5   |  23  |  0.97    Ca    8.3<L>      10 Oct 2018 05:55          MICROBIOLOGY:  RECENT CULTURES:  10-07 @ 15:19 .Blood Blood-Venous     No growth to date.    RADIOLOGY REVIEWED:

## 2018-10-10 NOTE — PROGRESS NOTE ADULT - SUBJECTIVE AND OBJECTIVE BOX
S: pt seen and examined. complains of blood on tissue after wiping.    O;  T(C): 37.7 (10-10-18 @ 05:06), Max: 37.7 (10-10-18 @ 05:06)  HR: 79 (10-10-18 @ 05:06) (79 - 92)  BP: 129/81 (10-10-18 @ 05:06) (110/70 - 129/81)  RR: 18 (10-10-18 @ 05:06) (18 - 20)  SpO2: 95% (10-10-18 @ 05:06) (95% - 99%)  Wt(kg): --  10-08 @ 07:01  -  10-09 @ 07:00  --------------------------------------------------------  IN:    IV PiggyBack: 200 mL    Oral Fluid: 480 mL  Total IN: 680 mL    OUT:  Total OUT: 0 mL    Total NET: 680 mL      10-09 @ 07:01  -  10-10 @ 06:45  --------------------------------------------------------  IN:    IV PiggyBack: 200 mL    sodium chloride 0.9%.: 120 mL  Total IN: 320 mL    OUT:  Total OUT: 0 mL    Total NET: 320 mL      Gen: NAD  Chest: breathing comfortably on room air  Abd: soft nt nd  Groin: left mons with induration, erythema no fluctuance, erythema appears to have decreased significantly based on outline from yesterday    .  LABS:                         12.3   11.8  )-----------( 156      ( 09 Oct 2018 06:00 )             38.3     10-10    132<L>  |  97  |  13  ----------------------------<  103<H>  3.5   |  23  |  0.97    Ca    8.3<L>      10 Oct 2018 05:55                RADIOLOGY, EKG & ADDITIONAL TESTS: Reviewed.   < from: CT Abdomen and Pelvis w/ IV Cont (10.07.18 @ 17:12) >  IMPRESSION:     Deep venous thrombosis involving the left common femoral vein. Inferior   extent of the thrombus is not visualized. Questionable involvement of the   left great saphenous vein.    Inflammatory changes of the left inguinal soft tissues extending into the   left perineum. No drainable fluid collection.    Dr. Lo discussed these findings with Dr. Dao on 10/7/2018 5:48 PM   with read back.                 NATALIE LO M.D., RADIOLOGY RESIDENT  This document has been electronically signed.  ROSALES VAZQUEZ M.D., ATTENDING RADIOLOGIST  This document has been electronically signed. Oct  7 2018  6:18PM    < end of copied text >

## 2018-10-11 LAB
ANION GAP SERPL CALC-SCNC: 11 MMOL/L — SIGNIFICANT CHANGE UP (ref 5–17)
BASOPHILS # BLD AUTO: 0 K/UL — SIGNIFICANT CHANGE UP (ref 0–0.2)
BASOPHILS NFR BLD AUTO: 0.1 % — SIGNIFICANT CHANGE UP (ref 0–2)
BUN SERPL-MCNC: 10 MG/DL — SIGNIFICANT CHANGE UP (ref 7–23)
CALCIUM SERPL-MCNC: 8.4 MG/DL — SIGNIFICANT CHANGE UP (ref 8.4–10.5)
CHLORIDE SERPL-SCNC: 97 MMOL/L — SIGNIFICANT CHANGE UP (ref 96–108)
CO2 SERPL-SCNC: 25 MMOL/L — SIGNIFICANT CHANGE UP (ref 22–31)
CREAT SERPL-MCNC: 0.89 MG/DL — SIGNIFICANT CHANGE UP (ref 0.5–1.3)
EOSINOPHIL # BLD AUTO: 0.4 K/UL — SIGNIFICANT CHANGE UP (ref 0–0.5)
EOSINOPHIL NFR BLD AUTO: 4.6 % — SIGNIFICANT CHANGE UP (ref 0–6)
GLUCOSE SERPL-MCNC: 106 MG/DL — HIGH (ref 70–99)
HCT VFR BLD CALC: 37 % — SIGNIFICANT CHANGE UP (ref 34.5–45)
HGB BLD-MCNC: 11.8 G/DL — SIGNIFICANT CHANGE UP (ref 11.5–15.5)
LYMPHOCYTES # BLD AUTO: 0.9 K/UL — LOW (ref 1–3.3)
LYMPHOCYTES # BLD AUTO: 10.5 % — LOW (ref 13–44)
MCHC RBC-ENTMCNC: 25.3 PG — LOW (ref 27–34)
MCHC RBC-ENTMCNC: 31.8 GM/DL — LOW (ref 32–36)
MCV RBC AUTO: 79.5 FL — LOW (ref 80–100)
MONOCYTES # BLD AUTO: 0.6 K/UL — SIGNIFICANT CHANGE UP (ref 0–0.9)
MONOCYTES NFR BLD AUTO: 7.4 % — SIGNIFICANT CHANGE UP (ref 2–14)
NEUTROPHILS # BLD AUTO: 6.3 K/UL — SIGNIFICANT CHANGE UP (ref 1.8–7.4)
NEUTROPHILS NFR BLD AUTO: 77.4 % — HIGH (ref 43–77)
PLATELET # BLD AUTO: 201 K/UL — SIGNIFICANT CHANGE UP (ref 150–400)
POTASSIUM SERPL-MCNC: 3.6 MMOL/L — SIGNIFICANT CHANGE UP (ref 3.5–5.3)
POTASSIUM SERPL-SCNC: 3.6 MMOL/L — SIGNIFICANT CHANGE UP (ref 3.5–5.3)
RBC # BLD: 4.66 M/UL — SIGNIFICANT CHANGE UP (ref 3.8–5.2)
RBC # FLD: 15.1 % — HIGH (ref 10.3–14.5)
SODIUM SERPL-SCNC: 133 MMOL/L — LOW (ref 135–145)
VANCOMYCIN TROUGH SERPL-MCNC: 5.5 UG/ML — LOW (ref 10–20)
WBC # BLD: 8.2 K/UL — SIGNIFICANT CHANGE UP (ref 3.8–10.5)
WBC # FLD AUTO: 8.2 K/UL — SIGNIFICANT CHANGE UP (ref 3.8–10.5)

## 2018-10-11 RX ORDER — VANCOMYCIN HCL 1 G
750 VIAL (EA) INTRAVENOUS EVERY 12 HOURS
Qty: 0 | Refills: 0 | Status: DISCONTINUED | OUTPATIENT
Start: 2018-10-11 | End: 2018-10-13

## 2018-10-11 RX ADMIN — Medication 650 MILLIGRAM(S): at 04:20

## 2018-10-11 RX ADMIN — Medication 1 TABLET(S): at 06:17

## 2018-10-11 RX ADMIN — HEPARIN SODIUM 5000 UNIT(S): 5000 INJECTION INTRAVENOUS; SUBCUTANEOUS at 18:01

## 2018-10-11 RX ADMIN — Medication 650 MILLIGRAM(S): at 18:46

## 2018-10-11 RX ADMIN — Medication 25 MILLIGRAM(S): at 06:17

## 2018-10-11 RX ADMIN — PIPERACILLIN AND TAZOBACTAM 25 GRAM(S): 4; .5 INJECTION, POWDER, LYOPHILIZED, FOR SOLUTION INTRAVENOUS at 21:57

## 2018-10-11 RX ADMIN — Medication 10 MILLIEQUIVALENT(S): at 12:49

## 2018-10-11 RX ADMIN — Medication 250 MILLIGRAM(S): at 18:59

## 2018-10-11 RX ADMIN — Medication 650 MILLIGRAM(S): at 10:14

## 2018-10-11 RX ADMIN — Medication 81 MILLIGRAM(S): at 12:48

## 2018-10-11 RX ADMIN — PIPERACILLIN AND TAZOBACTAM 25 GRAM(S): 4; .5 INJECTION, POWDER, LYOPHILIZED, FOR SOLUTION INTRAVENOUS at 06:17

## 2018-10-11 RX ADMIN — HEPARIN SODIUM 5000 UNIT(S): 5000 INJECTION INTRAVENOUS; SUBCUTANEOUS at 06:17

## 2018-10-11 RX ADMIN — Medication 1.5 GRAM(S): at 12:48

## 2018-10-11 RX ADMIN — Medication 650 MILLIGRAM(S): at 11:14

## 2018-10-11 RX ADMIN — PIPERACILLIN AND TAZOBACTAM 25 GRAM(S): 4; .5 INJECTION, POWDER, LYOPHILIZED, FOR SOLUTION INTRAVENOUS at 13:54

## 2018-10-11 RX ADMIN — Medication 650 MILLIGRAM(S): at 03:48

## 2018-10-11 RX ADMIN — ATORVASTATIN CALCIUM 10 MILLIGRAM(S): 80 TABLET, FILM COATED ORAL at 21:58

## 2018-10-11 NOTE — PROGRESS NOTE ADULT - ASSESSMENT
pt  admitted  with  h/o abd  pain/ / left  groin pain       , with elevated wbc/  hyponatremia/  dehydration  pt with cellulitis,  of supra  pubic area    doppler legs, no dvt    elevated wbc, from  steroid,  and from  cellulitis/ supra pubic area  wbc  decreasing from  17,000 to 11,000   surg  eval , noted, no intervention  on zosyn/ Vanco  , defer duration to ID   ct with no colitis,  will   stop prednisone  hyponatremia, stable  pt improving

## 2018-10-11 NOTE — PROVIDER CONTACT NOTE (OTHER) - ACTION/TREATMENT ORDERED:
Vanco 750 mg will be administered as prescribed upon med delivered from pharmacy. Pt monitoring continued. Safety maintained.

## 2018-10-11 NOTE — PROGRESS NOTE ADULT - SUBJECTIVE AND OBJECTIVE BOX
- Patient seen and examined.  - In summary, patient is a 73 year old woman who presented with LEFT GROIN PAIN (08 Oct 2018 09:58)  - Today, patient is without complaints.         *****MEDICATIONS:    MEDICATIONS  (STANDING):  aspirin enteric coated 81 milliGRAM(s) Oral daily  atorvastatin 10 milliGRAM(s) Oral at bedtime  calcium carbonate    500 mG (Tums) Chewable 1 Tablet(s) Chew two times a day  heparin  Injectable 5000 Unit(s) SubCutaneous every 12 hours  influenza   Vaccine 0.5 milliLiter(s) IntraMuscular once  mesalamine ER (24-Hour) Capsule 1.5 Gram(s) Oral daily  metoprolol succinate ER 25 milliGRAM(s) Oral daily  piperacillin/tazobactam IVPB. 3.375 Gram(s) IV Intermittent every 8 hours  potassium chloride    Tablet ER 10 milliEquivalent(s) Oral daily  vancomycin  IVPB 1000 milliGRAM(s) IV Intermittent every 24 hours    MEDICATIONS  (PRN):  acetaminophen   Tablet .. 650 milliGRAM(s) Oral every 6 hours PRN Temp greater or equal to 38C (100.4F), Mild Pain (1 - 3)  nystatin Cream 1 Application(s) Topical two times a day PRN rash/irritation  traMADol 50 milliGRAM(s) Oral two times a day PRN Moderate Pain (4 - 6)               ***** REVIEW OF SYSTEM:  GEN: no fever, no chills, no pain  RESP: no SOB, no cough, no sputum  CVS: no chest pain, no palpitations, no edema  GI: no abdominal pain, no nausea, no vomiting, no constipation, no diarrhea  : no dysuria, no frequency  NEURO: no headache, no dizziness  PSYCH: no depression, not anxious  Derm : no itching, no rash         ***** VITAL SIGNS:    T(F): 98.9 (10-11-18 @ 04:37), Max: 99.2 (10-10-18 @ 13:34)  HR: 77 (10-11-18 @ 04:37) (77 - 92)  BP: 142/82 (10-11-18 @ 04:37) (133/69 - 142/82)  RR: 18 (10-11-18 @ 04:37) (18 - 18)  SpO2: 96% (10-11-18 @ 04:37) (96% - 100%)  Wt(kg): --  ,   I&O's Summary    10 Oct 2018 07:  -  11 Oct 2018 07:00  --------------------------------------------------------  IN: 360 mL / OUT: 0 mL / NET: 360 mL    11 Oct 2018 07:  -  11 Oct 2018 10:22  --------------------------------------------------------  IN: 120 mL / OUT: 0 mL / NET: 120 mL                 *****PHYSICAL EXAM:  GEN: A&O X 3 , NAD , comfortable  HEENT: NCAT, EOMI, MMM, no icterus  NECK: Supple, No JVD  CVS: S1S2 , regular , No M/R/G appreciated  PULM: CTA B/L,  no W/R/R appreciated  ABD.: soft. non tender, non distended,  bowel sounds present  Extrem: intact pulses , no edema noted  Derm: No rash or ecchymosis noted  PSYCH: normal mood, no depression, not anxious         *****LAB AND IMAGIN.8   8.2   )-----------( 201      ( 11 Oct 2018 09:06 )             37.0               10-11    133<L>  |  97  |  10  ----------------------------<  106<H>  3.6   |  25  |  0.89    Ca    8.4      11 Oct 2018 09:05      [All pertinent recent Imaging/Reports reviewed]  < from: Transthoracic Echocardiogram (10.09.18 @ 15:04) >  Conclusions:  1. Aortic valve not well visualized; appears calcified.  2. Endocardium not well visualized; grossly preserved  left  ventricular systolic function.  3. The right ventricle is not well visualized; grossly  normal right ventricular systolic function.    < end of copied text >         *****A S S E S S M E N T   A N D   P L A N :  73F with fever, pubic area cellulitis  no  dvt on LE doppler  echo noted  abx per ID  surg f/u noted  continue current meds    __________________________  A. JUSTIN Gerber

## 2018-10-11 NOTE — PROGRESS NOTE ADULT - SUBJECTIVE AND OBJECTIVE BOX
in bed,  afebrile  less apin  REVIEW OF SYSTEMS:  GEN: no fever,    no chills  RESP: no SOB,   no cough  CVS: no chest pain,   no palpitations  GI: no abdominal pain,   no nausea,   no vomiting,   no constipation,   no diarrhea  : no dysuria,   no frequency  NEURO: no headache,   no dizziness  PSYCH: no depression,   not anxious  Derm : no rash    MEDICATIONS  (STANDING):  aspirin enteric coated 81 milliGRAM(s) Oral daily  atorvastatin 10 milliGRAM(s) Oral at bedtime  calcium carbonate    500 mG (Tums) Chewable 1 Tablet(s) Chew two times a day  heparin  Injectable 5000 Unit(s) SubCutaneous every 12 hours  influenza   Vaccine 0.5 milliLiter(s) IntraMuscular once  mesalamine ER (24-Hour) Capsule 1.5 Gram(s) Oral daily  metoprolol succinate ER 25 milliGRAM(s) Oral daily  piperacillin/tazobactam IVPB. 3.375 Gram(s) IV Intermittent every 8 hours  potassium chloride    Tablet ER 10 milliEquivalent(s) Oral daily  vancomycin  IVPB 1000 milliGRAM(s) IV Intermittent every 24 hours    MEDICATIONS  (PRN):  acetaminophen   Tablet .. 650 milliGRAM(s) Oral every 6 hours PRN Temp greater or equal to 38C (100.4F), Mild Pain (1 - 3)  nystatin Cream 1 Application(s) Topical two times a day PRN rash/irritation  traMADol 50 milliGRAM(s) Oral two times a day PRN Moderate Pain (4 - 6)      Vital Signs Last 24 Hrs  T(C): 37.2 (11 Oct 2018 04:37), Max: 37.3 (10 Oct 2018 13:34)  T(F): 98.9 (11 Oct 2018 04:37), Max: 99.2 (10 Oct 2018 13:34)  HR: 77 (11 Oct 2018 04:37) (77 - 92)  BP: 142/82 (11 Oct 2018 04:37) (133/69 - 142/82)  BP(mean): --  RR: 18 (11 Oct 2018 04:37) (18 - 18)  SpO2: 96% (11 Oct 2018 04:37) (96% - 100%)  CAPILLARY BLOOD GLUCOSE        I&O's Summary    10 Oct 2018 07:01  -  11 Oct 2018 07:00  --------------------------------------------------------  IN: 360 mL / OUT: 0 mL / NET: 360 mL        PHYSICAL EXAM:  HEAD:  Atraumatic, Normocephalic  NECK: Supple, No   JVD  CHEST/LUNG:   no     rales,     no,    rhonchi  HEART: Regular rate and rhythm;         murmur  ABDOMEN: Soft, Nontender, ;   EXTREMITIES:        edema  NEUROLOGY:  alert    LABS:    10-10    132<L>  |  97  |  13  ----------------------------<  103<H>  3.5   |  23  |  0.97    Ca    8.3<L>      10 Oct 2018 05:55                              Consultant(s) Notes Reviewed:      Care Discussed with Consultants/Other Providers:

## 2018-10-11 NOTE — CHART NOTE - NSCHARTNOTEFT_GEN_A_CORE
Pt seen and examined.    Exam: erythema and induration of left groin are improving, however the left labia is draining pus.    Plan:  -recommend gynecology consultation for management of labial abscess  -abx per ID  -remaining care per primary  -no general surgery intervention at this time    Jorge Penaloza MD PGY2  [facility] #[id number]

## 2018-10-11 NOTE — PROGRESS NOTE ADULT - SUBJECTIVE AND OBJECTIVE BOX
CC: f/u for left groin cellulitis     REVIEW OF SYSTEMS:  All other review of systems negative (Comprehensive ROS) ongoing left groin pain     Antimicrobials Day #  : 4  piperacillin/tazobactam IVPB. 3.375 Gram(s) IV Intermittent every 8 hours  vancomycin  IVPB 1000 milliGRAM(s) IV Intermittent every 24 hours    Other Medications Reviewed    T(F): 98.9 (10-11-18 @ 04:37), Max: 99.2 (10-10-18 @ 13:34)  HR: 77 (10-11-18 @ 04:37)  BP: 142/82 (10-11-18 @ 04:37)  RR: 18 (10-11-18 @ 04:37)  SpO2: 96% (10-11-18 @ 04:37)  Wt(kg): --    PHYSICAL EXAM:  General: alert, no acute distress  Eyes:  anicteric, no conjunctival injection, no discharge  Neck: supple, without adenopathy  Lungs: clear to auscultation  Heart: regular rate and rhythm; no murmurs  Abdomen: soft, nondistended, nontender  Genitals: Left groin erythema & warmth has largely improved. Ongoing pain and tenderness of the pubic area & now with purulent drainage from left labia majora.   Skin: no lesions  Extremities: no clubbing, cyanosis, or edema  Neurologic: alert, oriented, moves all extremities    LAB RESULTS:                        11.8   8.2   )-----------( 201      ( 11 Oct 2018 09:06 )             37.0     10-11    133<L>  |  97  |  10  ----------------------------<  106<H>  3.6   |  25  |  0.89    Ca    8.4      11 Oct 2018 09:05    MICROBIOLOGY:  RECENT CULTURES:  10-07 @ 15:19 .Blood Blood-Venous     No growth to date.    RADIOLOGY REVIEWED:

## 2018-10-11 NOTE — CHART NOTE - NSCHARTNOTEFT_GEN_A_CORE
Surgery contacted (as requested by ID) - for evaluation / I&D with culture of Left Labia Abscess - draining purulent discharge.  Contacted by surgery at this time - following evaluation, surgery feels GYN evaluation (based on location of abscess) is more appropriate.  GYN contacted at this time and consult requested - will see patient in AM as per GYN

## 2018-10-11 NOTE — PROGRESS NOTE ADULT - ASSESSMENT
73y female with a PMH significant for HTN, HLD, recent diagnosis of ulcerative colitis on mesalamine with prednisone taper   Admitted with left groin/suprapubic cellulitis that started on 10/03/18. Found febrile in hospital to 101.7 with leukocytosis of 16K  Prednisone stopped on 10/09/18  Afebrile now & leukocytosis resolved  Left groin cellulitis improved but now with purulent drainage from a small opening at the left labia majora. area still TTP   Vanco trough is low     PLAN:   Continue zosyn   Increase dose of vancomycin to 750 q 12 hrs  Follow blood cx & serial CBCs  Case d/w medicine NP - recommend surgery consult for I & D of area draining pus   Dr. Garica aware   Daughter in law updated at bedside

## 2018-10-12 DIAGNOSIS — L03.90 CELLULITIS, UNSPECIFIED: ICD-10-CM

## 2018-10-12 LAB
ANION GAP SERPL CALC-SCNC: 13 MMOL/L — SIGNIFICANT CHANGE UP (ref 5–17)
BASOPHILS # BLD AUTO: 0 K/UL — SIGNIFICANT CHANGE UP (ref 0–0.2)
BASOPHILS NFR BLD AUTO: 0.2 % — SIGNIFICANT CHANGE UP (ref 0–2)
BUN SERPL-MCNC: 11 MG/DL — SIGNIFICANT CHANGE UP (ref 7–23)
CALCIUM SERPL-MCNC: 8 MG/DL — LOW (ref 8.4–10.5)
CHLORIDE SERPL-SCNC: 100 MMOL/L — SIGNIFICANT CHANGE UP (ref 96–108)
CO2 SERPL-SCNC: 22 MMOL/L — SIGNIFICANT CHANGE UP (ref 22–31)
CREAT SERPL-MCNC: 0.83 MG/DL — SIGNIFICANT CHANGE UP (ref 0.5–1.3)
CULTURE RESULTS: SIGNIFICANT CHANGE UP
CULTURE RESULTS: SIGNIFICANT CHANGE UP
EOSINOPHIL # BLD AUTO: 0.3 K/UL — SIGNIFICANT CHANGE UP (ref 0–0.5)
EOSINOPHIL NFR BLD AUTO: 5.4 % — SIGNIFICANT CHANGE UP (ref 0–6)
GLUCOSE SERPL-MCNC: 98 MG/DL — SIGNIFICANT CHANGE UP (ref 70–99)
HCT VFR BLD CALC: 30.7 % — LOW (ref 34.5–45)
HGB BLD-MCNC: 10 G/DL — LOW (ref 11.5–15.5)
LYMPHOCYTES # BLD AUTO: 0.7 K/UL — LOW (ref 1–3.3)
LYMPHOCYTES # BLD AUTO: 12 % — LOW (ref 13–44)
MCHC RBC-ENTMCNC: 26 PG — LOW (ref 27–34)
MCHC RBC-ENTMCNC: 32.6 GM/DL — SIGNIFICANT CHANGE UP (ref 32–36)
MCV RBC AUTO: 79.6 FL — LOW (ref 80–100)
MONOCYTES # BLD AUTO: 0.5 K/UL — SIGNIFICANT CHANGE UP (ref 0–0.9)
MONOCYTES NFR BLD AUTO: 8.3 % — SIGNIFICANT CHANGE UP (ref 2–14)
NEUTROPHILS # BLD AUTO: 4.2 K/UL — SIGNIFICANT CHANGE UP (ref 1.8–7.4)
NEUTROPHILS NFR BLD AUTO: 74.1 % — SIGNIFICANT CHANGE UP (ref 43–77)
PLATELET # BLD AUTO: 164 K/UL — SIGNIFICANT CHANGE UP (ref 150–400)
POTASSIUM SERPL-MCNC: 3.1 MMOL/L — LOW (ref 3.5–5.3)
POTASSIUM SERPL-SCNC: 3.1 MMOL/L — LOW (ref 3.5–5.3)
RBC # BLD: 3.86 M/UL — SIGNIFICANT CHANGE UP (ref 3.8–5.2)
RBC # FLD: 14.5 % — SIGNIFICANT CHANGE UP (ref 10.3–14.5)
SODIUM SERPL-SCNC: 135 MMOL/L — SIGNIFICANT CHANGE UP (ref 135–145)
SPECIMEN SOURCE: SIGNIFICANT CHANGE UP
SPECIMEN SOURCE: SIGNIFICANT CHANGE UP
WBC # BLD: 5.6 K/UL — SIGNIFICANT CHANGE UP (ref 3.8–10.5)
WBC # FLD AUTO: 5.6 K/UL — SIGNIFICANT CHANGE UP (ref 3.8–10.5)

## 2018-10-12 RX ORDER — MORPHINE SULFATE 50 MG/1
1 CAPSULE, EXTENDED RELEASE ORAL ONCE
Qty: 0 | Refills: 0 | Status: DISCONTINUED | OUTPATIENT
Start: 2018-10-12 | End: 2018-10-12

## 2018-10-12 RX ORDER — FAMOTIDINE 10 MG/ML
20 INJECTION INTRAVENOUS DAILY
Qty: 0 | Refills: 0 | Status: DISCONTINUED | OUTPATIENT
Start: 2018-10-12 | End: 2018-10-12

## 2018-10-12 RX ORDER — FAMOTIDINE 10 MG/ML
20 INJECTION INTRAVENOUS DAILY
Qty: 0 | Refills: 0 | Status: COMPLETED | OUTPATIENT
Start: 2018-10-12 | End: 2018-10-14

## 2018-10-12 RX ORDER — POTASSIUM CHLORIDE 20 MEQ
40 PACKET (EA) ORAL ONCE
Qty: 0 | Refills: 0 | Status: COMPLETED | OUTPATIENT
Start: 2018-10-12 | End: 2018-10-12

## 2018-10-12 RX ADMIN — Medication 25 MILLIGRAM(S): at 05:40

## 2018-10-12 RX ADMIN — Medication 1 TABLET(S): at 05:40

## 2018-10-12 RX ADMIN — Medication 650 MILLIGRAM(S): at 04:15

## 2018-10-12 RX ADMIN — ATORVASTATIN CALCIUM 10 MILLIGRAM(S): 80 TABLET, FILM COATED ORAL at 22:11

## 2018-10-12 RX ADMIN — PIPERACILLIN AND TAZOBACTAM 25 GRAM(S): 4; .5 INJECTION, POWDER, LYOPHILIZED, FOR SOLUTION INTRAVENOUS at 05:40

## 2018-10-12 RX ADMIN — Medication 40 MILLIEQUIVALENT(S): at 11:27

## 2018-10-12 RX ADMIN — FAMOTIDINE 20 MILLIGRAM(S): 10 INJECTION INTRAVENOUS at 11:27

## 2018-10-12 RX ADMIN — HEPARIN SODIUM 5000 UNIT(S): 5000 INJECTION INTRAVENOUS; SUBCUTANEOUS at 17:00

## 2018-10-12 RX ADMIN — Medication 81 MILLIGRAM(S): at 11:27

## 2018-10-12 RX ADMIN — Medication 1.5 GRAM(S): at 11:28

## 2018-10-12 RX ADMIN — Medication 10 MILLIEQUIVALENT(S): at 11:27

## 2018-10-12 RX ADMIN — Medication 650 MILLIGRAM(S): at 03:44

## 2018-10-12 RX ADMIN — MORPHINE SULFATE 1 MILLIGRAM(S): 50 CAPSULE, EXTENDED RELEASE ORAL at 10:02

## 2018-10-12 RX ADMIN — HEPARIN SODIUM 5000 UNIT(S): 5000 INJECTION INTRAVENOUS; SUBCUTANEOUS at 05:40

## 2018-10-12 RX ADMIN — PIPERACILLIN AND TAZOBACTAM 25 GRAM(S): 4; .5 INJECTION, POWDER, LYOPHILIZED, FOR SOLUTION INTRAVENOUS at 22:11

## 2018-10-12 RX ADMIN — Medication 250 MILLIGRAM(S): at 18:55

## 2018-10-12 RX ADMIN — PIPERACILLIN AND TAZOBACTAM 25 GRAM(S): 4; .5 INJECTION, POWDER, LYOPHILIZED, FOR SOLUTION INTRAVENOUS at 13:58

## 2018-10-12 RX ADMIN — Medication 650 MILLIGRAM(S): at 18:55

## 2018-10-12 RX ADMIN — MORPHINE SULFATE 1 MILLIGRAM(S): 50 CAPSULE, EXTENDED RELEASE ORAL at 09:47

## 2018-10-12 RX ADMIN — Medication 250 MILLIGRAM(S): at 05:40

## 2018-10-12 NOTE — PROGRESS NOTE ADULT - SUBJECTIVE AND OBJECTIVE BOX
CC: f/u for left groin cellulitis     REVIEW OF SYSTEMS:  All other review of systems negative (Comprehensive ROS)    Antimicrobials Day #  : 5  piperacillin/tazobactam IVPB. 3.375 Gram(s) IV Intermittent every 8 hours  vancomycin  IVPB 750 milliGRAM(s) IV Intermittent every 12 hours    Other Medications Reviewed    T(F): 98.2 (10-12-18 @ 10:06), Max: 99 (10-11-18 @ 21:49)  HR: 72 (10-12-18 @ 10:06)  BP: 137/83 (10-12-18 @ 10:06)  RR: 20 (10-12-18 @ 10:06)  SpO2: 99% (10-12-18 @ 10:06)  Wt(kg): --    PHYSICAL EXAM:  General: alert, no acute distress  Eyes:  anicteric, no conjunctival injection, no discharge  Neck: supple, without adenopathy  Lungs: clear to auscultation  Heart: regular rate and rhythm; no murmurs  Abdomen: soft, nondistended, nontender  Genitals: Left groin erythema & warmth has largely improved. Purulent drainage from left labia majora, with surrounding edema, induration, erythema & TTP  Skin: no lesions  Extremities: no clubbing, cyanosis, or edema  Neurologic: alert, oriented, moves all extremities    LAB RESULTS:                        10.0   5.6   )-----------( 164      ( 12 Oct 2018 06:06 )             30.7     10-12    135  |  100  |  11  ----------------------------<  98  3.1<L>   |  22  |  0.83    Ca    8.0<L>      12 Oct 2018 06:06      MICROBIOLOGY:  RECENT CULTURES:  10-07 @ 15:19 .Blood Blood-Venous     No growth to date.        RADIOLOGY REVIEWED:

## 2018-10-12 NOTE — PROGRESS NOTE ADULT - SUBJECTIVE AND OBJECTIVE BOX
- Patient seen and examined.  - In summary, patient is a 73 year old woman who presented with LEFT GROIN PAIN (08 Oct 2018 09:58)  - Today, patient is without complaints.         *****MEDICATIONS:    MEDICATIONS  (STANDING):  aspirin enteric coated 81 milliGRAM(s) Oral daily  atorvastatin 10 milliGRAM(s) Oral at bedtime  calcium carbonate    500 mG (Tums) Chewable 1 Tablet(s) Chew two times a day  famotidine    Tablet 20 milliGRAM(s) Oral daily  heparin  Injectable 5000 Unit(s) SubCutaneous every 12 hours  influenza   Vaccine 0.5 milliLiter(s) IntraMuscular once  mesalamine ER (24-Hour) Capsule 1.5 Gram(s) Oral daily  metoprolol succinate ER 25 milliGRAM(s) Oral daily  piperacillin/tazobactam IVPB. 3.375 Gram(s) IV Intermittent every 8 hours  potassium chloride    Tablet ER 10 milliEquivalent(s) Oral daily  potassium chloride    Tablet ER 40 milliEquivalent(s) Oral once  vancomycin  IVPB 750 milliGRAM(s) IV Intermittent every 12 hours    MEDICATIONS  (PRN):  acetaminophen   Tablet .. 650 milliGRAM(s) Oral every 6 hours PRN Temp greater or equal to 38C (100.4F), Mild Pain (1 - 3)  nystatin Cream 1 Application(s) Topical two times a day PRN rash/irritation  traMADol 50 milliGRAM(s) Oral two times a day PRN Moderate Pain (4 - 6)           ***** REVIEW OF SYSTEM:  GEN: no fever, no chills, no pain  RESP: no SOB, no cough, no sputum  CVS: no chest pain, no palpitations, no edema  GI: no abdominal pain, no nausea, no vomiting, no constipation, no diarrhea  : no dysuria, no frequency  NEURO: no headache, no dizziness  PSYCH: no depression, not anxious  Derm : no itching, no rash         ***** VITAL SIGNS:    T(F): 98.2 (10-12-18 @ 10:06), Max: 99 (10-11-18 @ 21:49)  HR: 72 (10-12-18 @ 10:06) (72 - 79)  BP: 137/83 (10-12-18 @ 10:06) (117/79 - 138/77)  RR: 20 (10-12-18 @ 10:06) (18 - 20)  SpO2: 99% (10-12-18 @ 10:06) (98% - 100%)  Wt(kg): --  ,   I&O's Summary    11 Oct 2018 07:01  -  12 Oct 2018 07:00  --------------------------------------------------------  IN: 810 mL / OUT: 3 mL / NET: 807 mL    12 Oct 2018 07:01  -  12 Oct 2018 10:35  --------------------------------------------------------  IN: 0 mL / OUT: 1 mL / NET: -1 mL                   *****PHYSICAL EXAM:  GEN: A&O X 3 , NAD , comfortable  HEENT: NCAT, EOMI, MMM, no icterus  NECK: Supple, No JVD  CVS: S1S2 , regular , No M/R/G appreciated  PULM: CTA B/L,  no W/R/R appreciated  ABD.: soft. non tender, non distended,  bowel sounds present  Extrem: intact pulses , no edema noted  Derm: No rash or ecchymosis noted  PSYCH: normal mood, no depression, not anxious         *****LAB AND IMAGING:                                          10.0   5.6   )-----------( 164      ( 12 Oct 2018 06:06 )             30.7               10-12    135  |  100  |  11  ----------------------------<  98  3.1<L>   |  22  |  0.83    Ca    8.0<L>      12 Oct 2018 06:06        [All pertinent recent Imaging/Reports reviewed]  < from: Transthoracic Echocardiogram (10.09.18 @ 15:04) >  Conclusions:  1. Aortic valve not well visualized; appears calcified.  2. Endocardium not well visualized; grossly preserved  left  ventricular systolic function.  3. The right ventricle is not well visualized; grossly  normal right ventricular systolic function.    < end of copied text >         *****A S S E S S M E N T   A N D   P L A N :  73F with fever, pubic area cellulitis  no  dvt on LE doppler  echo noted  abx per ID  continue current meds  f/u gyn    __________________________  A. DEBBI Gerber.

## 2018-10-12 NOTE — CONSULT NOTE ADULT - SUBJECTIVE AND OBJECTIVE BOX
Patient is a 73y old  Female who presents with a chief complaint of LEFT GROIN PAIN (12 Oct 2018 11:25)      HPI:  72 yo female with pmh of  HTN, gerd,,  ca  breast  5  yeara ago,     newly diagnosed with Ulcerative colitis, had  colonoscopy,  3  weeks ago  and was started on Mesalamine and PO prednisone.  She admitted  with left groin pain and lower  abd  pain and fevers. SHe reported a "pimple"  in supra  pubic  area, has  worsened  with swelling/ redness     admitted with left groin cellulitis since 10/3/18. Patient has been on IV antibiotics, hydration, with notable improvement in cellulitis. Prednisone was discontinued this admission.     Patient reports pain in groin area is significantly improved from admission, and she is comfortable when laying in bed. She feels pain when walking around, but it is tolerable. Patient describes constant pus draining while laying down, with much more drainage when she goes to the bathroom. She denies vaginal bleeding or discharge from vagina. She denies fevers, chills, myalgias, dyspnea, or palpitations. She is tolerating regular diet without nausea or vomiting. no rectal bleeding since admission.  (was noted with intermittent rectal bleeding prior to colonoscopy 3-4 weeks ago, dx with UC, now no bleeding, taking mesalamine)    For past 2 days, pus noted to be draining from site on left labia, GYN consulted today and noted to have spontaneous drainage of pus from wound in pubis - wound was explored and packed with iodoform gauze  +fever in hospital ~101F    Also found to have DVT on imaging    PAST MEDICAL & SURGICAL HISTORY:  Colitis  HTN   GERD  Breast CA  Diverticulosis    Allergies  No Known Allergies        MEDICATIONS  (STANDING):  aspirin enteric coated 81 milliGRAM(s) Oral daily  atorvastatin 10 milliGRAM(s) Oral at bedtime  calcium carbonate    500 mG (Tums) Chewable 1 Tablet(s) Chew two times a day  famotidine    Tablet 20 milliGRAM(s) Oral daily  heparin  Injectable 5000 Unit(s) SubCutaneous every 12 hours  influenza   Vaccine 0.5 milliLiter(s) IntraMuscular once  mesalamine ER (24-Hour) Capsule 1.5 Gram(s) Oral daily  metoprolol succinate ER 25 milliGRAM(s) Oral daily  piperacillin/tazobactam IVPB. 3.375 Gram(s) IV Intermittent every 8 hours  potassium chloride    Tablet ER 10 milliEquivalent(s) Oral daily  vancomycin  IVPB 750 milliGRAM(s) IV Intermittent every 12 hours    MEDICATIONS  (PRN):  acetaminophen   Tablet .. 650 milliGRAM(s) Oral every 6 hours PRN Temp greater or equal to 38C (100.4F), Mild Pain (1 - 3)  nystatin Cream 1 Application(s) Topical two times a day PRN rash/irritation  traMADol 50 milliGRAM(s) Oral two times a day PRN Moderate Pain (4 - 6)      Social History:  lives alone    no tobacco or ETOH abuse    Family History   father dies > 50 years ago ?history of colitis, no reported colonoscopy    Health Management     Last Colonoscopy - 3-4 weeks ago Dr Kaur      Advanced Directives: (  X   ) None    (      ) DNR    (     ) DNI    (     ) Health Care Proxy:     Review of Systems:    General:  No wt loss, chills, night sweats, fatigue  CV:  No pain, palpitations, hypo/hypertension  Resp:  No dyspnea, cough, tachypnea, wheezing  GI:  see HPI  :  see HPI  Muscle:  No pain, weakness  Neuro:  No weakness, tingling, memory problems  Psych:  No fatigue, insomnia, mood problems, depression  Endocrine:  No polyuria, polydypsia, cold/heat intolerance  Heme:  No petechiae, ecchymosis, easy bruisability  Skin:  No rash, tattoos, scars, edema      Vital Signs Last 24 Hrs  T(C): 36.6 (12 Oct 2018 14:26), Max: 37.2 (11 Oct 2018 21:49)  T(F): 97.9 (12 Oct 2018 14:26), Max: 99 (11 Oct 2018 21:49)  HR: 74 (12 Oct 2018 14:26) (72 - 79)  BP: 119/81 (12 Oct 2018 14:26) (119/81 - 138/77)  BP(mean): --  RR: 17 (12 Oct 2018 14:26) (17 - 20)  SpO2: 98% (12 Oct 2018 14:26) (98% - 100%)    PHYSICAL EXAM:    Constitutional: NAD, well-developed pleasant female  Neck: No LAD, supple  Respiratory: CTA and P  Cardiovascular: S1 and S2, RRR, no M  Gastrointestinal: BS+, soft, obese soft LLQ area of demarcation - without erythema +erythema and induration of Left groin/pubis +tender to palpation +wound with iodoform gauze +pus  no induration no gangrenous appearance  Extremities: +edema  Vascular: 2+ peripheral pulses  Neurological: A/O x 3, no focal deficits  Psychiatric: Normal mood, normal affect  Skin: see above        LABS:                        10.0   5.6   )-----------( 164      ( 12 Oct 2018 06:06 )             30.7     10-12    135  |  100  |  11  ----------------------------<  98  3.1<L>   |  22  |  0.83    Ca    8.0<L>      12 Oct 2018 06:06      Culture - Blood (10.07.18 @ 15:19)    Specimen Source: .Blood Blood-Venous    Culture Results:   No growth to date.    Culture - Blood (10.07.18 @ 15:19)    Specimen Source: .Blood Blood-Venous    Culture Results:   No growth to date.      RADIOLOGY & ADDITIONAL TESTS:  < from: CT Abdomen and Pelvis w/ IV Cont (10.07.18 @ 17:12) >  FINDINGS:    LOWER CHEST: Within normal limits.    LIVER: 1.2 cm left hepatic lobe cyst.  BILE DUCTS: Normal caliber.  GALLBLADDER: Within normal limits.  SPLEEN: Measures 13.3 cm  PANCREAS: Within normal limits.  ADRENALS: Within normal limits.  KIDNEYS/URETERS: Bilateral hypoattenuating lesions, too small to   characterize. No hydronephrosis.    BLADDER: Within normal limits.  REPRODUCTIVE ORGANS: Nonvisualized uterus. 0.9 cm cystic lesion in the   right adnexa.    BOWEL: Small hiatal hernia. No bowel obstruction. Nonvisualized appendix.   PERITONEUM: No ascites.  VESSELS: Attenuation of the left femoral vein at the takeoff of the   greater saphenous vein with filling defect seen distally concerning for   deep venous thrombosis. Questionable involvement of the great saphenous   vein. The inferior extent of the thrombus is not visualized. Normal   caliber aorta with atherosclerotic disease. Left retroaortic renal vein. .  RETROPERITONEUM: No lymphadenopathy.    ABDOMINAL WALL: Marked inflammatory change of the left inguinal region   extending into the left perineum. No subcutaneous fluid collection.   Numerous regional lymph nodes, nonenlarged.   BONES: Status post L5-S1 spinal fusion. Compression deformity of the T10   vertebral body.    IMPRESSION:     Deep venous thrombosis involving the left common femoral vein. Inferior   extent of the thrombus is not visualized. Questionable involvement of the   left great saphenous vein.    Inflammatory changes of the left inguinal soft tissues extending into the   left perineum. No drainable fluid collection.    Dr. Chanel discussed these findings with Dr. Dao on 10/7/2018 5:48 PM   with read back.

## 2018-10-12 NOTE — PROGRESS NOTE ADULT - ASSESSMENT
73y female with a PMH significant for HTN, HLD, recent diagnosis of ulcerative colitis on mesalamine with prednisone taper   Admitted with left groin/suprapubic cellulitis that started on 10/03/18. Found febrile in hospital to 101.7 with leukocytosis of 16K  Prednisone stopped on 10/09/18  Fevers & leukocytosis resolved  Left groin cellulitis improved but she developed purulent drainage from a small opening at the left labia majora  S/p bedside I & D by Gyn     PLAN:   Continue zosyn & vancomycin   Follow cx & serial CBCs  Check vanco trough in AM  Case d/w medicine NP

## 2018-10-12 NOTE — CONSULT NOTE ADULT - SUBJECTIVE AND OBJECTIVE BOX
NOTE IN PROGRESS  R3 GYN Consult Note    74yo  postmenopausal s/p MARTIN/BS @ 48yo for fibroids, with h/o HTN, HLD, and newly diagnosed ulcerative colitis (in past month) on mesalamine and prednisone taper admitted with left groin cellulitis since 10/3/18. Patient has been on IV antibiotics, hydration, with notable improvement in cellulitis. Prednisone was discontinued this admission. For past 2 days, pus noted to be draining from site on left labia, GYN consulted for eval.    Patient reports pain in groin area is significantly improved from admission, and she is comfortable when laying in bed. She feels pain when walking around, but it is tolerable. Patient describes constant pus draining while laying down, with much more drainage when she goes to the bathroom. She denies vaginal bleeding or discharge from vagina. She denies fevers, chills, myalgias, dyspnea, or palpitations. She is tolerating regular diet without nausea or vomiting.    GYN = Dr. 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  NKDA  Home Medications:  Aleve 220 mg oral tablet: 1 tab(s) orally , As Needed (08 Oct 2018 10:40)  Apriso 0.375 g oral capsule, extended release: 4 cap(s) orally once a day (08 Oct 2018 10:40)  aspirin 81 mg oral tablet: 1 tab(s) orally once a day (08 Oct 2018 10:40)  hydroCHLOROthiazide 25 mg oral tablet: 1 tab(s) orally once a day (08 Oct 2018 10:40)  Klor-Con 10 oral tablet, extended release: 1 tab(s) orally once a day (08 Oct 2018 10:40)  Lipitor 10 mg oral tablet: 1 tab(s) orally every other day (08 Oct 2018 10:40)  pantoprazole 40 mg oral delayed release tablet: 1 tab(s) orally once a day (08 Oct 2018 10:40)  predniSONE 10 mg oral tablet: 2 tab(s) orally once a day x 1 week (tapering)  (08 Oct 2018 10:40)  Toprol-XL 25 mg oral tablet, extended release: 1 tab(s) orally once a day (08 Oct 2018 10:40)  Vitamin D3 1000 intl units oral tablet: 1 tab(s) orally once a day (08 Oct 2018 10:40)           Vital Signs Last 24 Hrs  T(C): 37 (12 Oct 2018 04:27), Max: 37.2 (11 Oct 2018 21:49)  T(F): 98.6 (12 Oct 2018 04:27), Max: 99 (11 Oct 2018 21:49)  HR: 74 (12 Oct 2018 04:27) (74 - 79)  BP: 138/77 (12 Oct 2018 04:27) (117/79 - 138/77)  RR: 19 (12 Oct 2018 04:27) (18 - 19)  SpO2: 98% (12 Oct 2018 04:27) (98% - 100%)    PE:  Gen: Comfortable, NAD  Abd: obese, soft, nontender  : area of erythema & induration along left half of mons, tracking down to involve left labia majora. Mildly tender to palpation, no fluctuance. Pus actively draining from subcentimeter opening on left labia majora  Ext: warm/well perfused, no calf tenderness bilaterally    LABS:                                   10.0   5.6   )-----------( 164      ( 12 Oct 2018 06:06 )             30.7   baso 0.2    eos 5.4    imm gran x      lymph 12.0   mono 8.3    poly 74.1                         11.8   8.2   )-----------( 201      ( 11 Oct 2018 09:06 )             37.0   baso 0.1    eos 4.6    imm gran x      lymph 10.5   mono 7.4    poly 77.4       10-12    135  |  100  |  11  ----------------------------<  98  3.1<L>   |  22  |  0.83    Ca    8.0<L>      12 Oct 2018 06:06            RADIOLOGY & ADDITIONAL STUDIES:  < from: CT Abdomen and Pelvis w/ IV Cont (10.07.18 @ 17:12) >  IMPRESSION:     Deep venous thrombosis involving the left common femoral vein. Inferior   extent of the thrombus is not visualized. Questionable involvement of the   left great saphenous vein.    Inflammatory changes of the left inguinal soft tissues extending into the   left perineum. No drainable fluid collection.    < end of copied text >

## 2018-10-12 NOTE — CONSULT NOTE ADULT - ATTENDING COMMENTS
seen and evalauted pt. Spontaneously draining left groin/mons abscess. Hole made larger at bedside and packed with iodoform. Draining pus. Will evaluate and change packing daily. Encouraged pt to continue expressing herself

## 2018-10-12 NOTE — CONSULT NOTE ADULT - PROBLEM SELECTOR RECOMMENDATION 9
- discharge collected/ sent for culture  - hole was expanded at bedside for better drainage, packed with iodoform. GYN will change packing daily  - recommend warm compresses  - continue antibiotics per primary team

## 2018-10-12 NOTE — CONSULT NOTE ADULT - ASSESSMENT
74 yo female with PMHof  HTN, GERD,,  Breast CA 5  yeara ago, newly diagnosed with Ulcerative colitis (had  colonoscopy 3-4  weeks ago)   admitted with left groin cellulitis , clinically not c/w pyoderma gangrenosum. ID and GYN following  New DVT on imaging    Anemia - without overt/brisk GI bleed, likely related to UC    PLAN  Continue Mesalamine  Agree to monitor off prednisone given acute infectious process  Pepcid for GI prophylaxis  Abx as per ID    Will follow along with you. no acute GI issues at this time  Please call over weekend prn with GI concerns    Parmjit Winn PA-C    [facility]  [phone]

## 2018-10-12 NOTE — CONSULT NOTE ADULT - ASSESSMENT
74yo  postmenopausal s/p MARTIN/EMMANUEL @ 48yo 72yo  postmenopausal s/p MARTIN/BS @ 48yo with with h/o HTN, HLD, and newly diagnosed ulcerative colitis (in past month) on mesalamine and prednisone taper admitted with left groin cellulitis. Pt now with spontaneously draining left mons/labia abscess.

## 2018-10-12 NOTE — PROGRESS NOTE ADULT - ASSESSMENT
pt  admitted  with  h/o abd  pain/ / left  groin pain       , with elevated wbc/  hyponatremia/  dehydration  pt with cellulitis,  of supra  pubic area    doppler legs, no dvt    elevated wbc, from  steroid,  and from  cellulitis/ supra pubic area  wbc  decreasing from  17,000 to 11,000   surg  eval , noted, no intervention  on zosyn/ Vanco  , defer duration to ID   ct with no colitis,  will   stop prednisone  hyponatremia, stable  gyn eval, for  left  labia  majora,  with drainage pt  admitted  with  h/o abd  pain/ / left  groin pain       , with elevated wbc/  hyponatremia/  dehydration  pt with cellulitis,  of supra  pubic area    doppler legs, no dvt    elevated wbc, from  steroid,  and from  cellulitis/ supra pubic area  wbc  decreasing from  17,000 to 11,000   surg  eval , noted, no intervention  on zosyn/ Vanco  , defer duration to ID   ct with no colitis,  will   stop prednisone/ also  will promote  wound  healing  hyponatremia, stable  gyn eval noted,

## 2018-10-12 NOTE — PROGRESS NOTE ADULT - SUBJECTIVE AND OBJECTIVE BOX
in bed   draining left labia majora,  awaiting gyn eval    REVIEW OF SYSTEMS:  GEN: no fever,    no chills  RESP: no SOB,   no cough  CVS: no chest pain,   no palpitations  GI: no abdominal pain,   no nausea,   no vomiting,   no constipation,   no diarrhea  : no dysuria,   no frequency  NEURO: no headache,   no dizziness  PSYCH: no depression,   not anxious  Derm : no rash    MEDICATIONS  (STANDING):  aspirin enteric coated 81 milliGRAM(s) Oral daily  atorvastatin 10 milliGRAM(s) Oral at bedtime  calcium carbonate    500 mG (Tums) Chewable 1 Tablet(s) Chew two times a day  famotidine    Tablet 20 milliGRAM(s) Oral daily  heparin  Injectable 5000 Unit(s) SubCutaneous every 12 hours  influenza   Vaccine 0.5 milliLiter(s) IntraMuscular once  mesalamine ER (24-Hour) Capsule 1.5 Gram(s) Oral daily  metoprolol succinate ER 25 milliGRAM(s) Oral daily  piperacillin/tazobactam IVPB. 3.375 Gram(s) IV Intermittent every 8 hours  potassium chloride    Tablet ER 10 milliEquivalent(s) Oral daily  potassium chloride    Tablet ER 40 milliEquivalent(s) Oral once  vancomycin  IVPB 750 milliGRAM(s) IV Intermittent every 12 hours    MEDICATIONS  (PRN):  acetaminophen   Tablet .. 650 milliGRAM(s) Oral every 6 hours PRN Temp greater or equal to 38C (100.4F), Mild Pain (1 - 3)  nystatin Cream 1 Application(s) Topical two times a day PRN rash/irritation  traMADol 50 milliGRAM(s) Oral two times a day PRN Moderate Pain (4 - 6)      Vital Signs Last 24 Hrs  T(C): 37 (12 Oct 2018 04:27), Max: 37.2 (11 Oct 2018 21:49)  T(F): 98.6 (12 Oct 2018 04:27), Max: 99 (11 Oct 2018 21:49)  HR: 74 (12 Oct 2018 04:27) (74 - 79)  BP: 138/77 (12 Oct 2018 04:27) (117/79 - 138/77)  BP(mean): --  RR: 19 (12 Oct 2018 04:27) (18 - 19)  SpO2: 98% (12 Oct 2018 04:27) (98% - 100%)  CAPILLARY BLOOD GLUCOSE        I&O's Summary    11 Oct 2018 07:01  -  12 Oct 2018 07:00  --------------------------------------------------------  IN: 360 mL / OUT: 0 mL / NET: 360 mL        PHYSICAL EXAM:  HEAD:  Atraumatic, Normocephalic  NECK: Supple, No   JVD  CHEST/LUNG:   no     rales,     no,    rhonchi  HEART: Regular rate and rhythm;         murmur  ABDOMEN: Soft, Nontender, ;   EXTREMITIES:   no     edema  supra pubic  swelling/ redness, decraesing  NEUROLOGY:  alert    LABS:                        10.0   5.6   )-----------( 164      ( 12 Oct 2018 06:06 )             30.7     10-12    135  |  100  |  11  ----------------------------<  98  3.1<L>   |  22  |  0.83    Ca    8.0<L>      12 Oct 2018 06:06                              Consultant(s) Notes Reviewed:      Care Discussed with Consultants/Other Providers: in bed   draining minimal  left labia majora,. per  gyn   area  of  fluctuance, in supra  pubic  area/ expressed   pus. pt  feels better    REVIEW OF SYSTEMS:  GEN: no fever,    no chills  RESP: no SOB,   no cough  CVS: no chest pain,   no palpitations  GI: no abdominal pain,   no nausea,   no vomiting,   no constipation,   no diarrhea  : no dysuria,   no frequency  NEURO: no headache,   no dizziness  PSYCH: no depression,   not anxious  Derm : no rash    MEDICATIONS  (STANDING):  aspirin enteric coated 81 milliGRAM(s) Oral daily  atorvastatin 10 milliGRAM(s) Oral at bedtime  calcium carbonate    500 mG (Tums) Chewable 1 Tablet(s) Chew two times a day  famotidine    Tablet 20 milliGRAM(s) Oral daily  heparin  Injectable 5000 Unit(s) SubCutaneous every 12 hours  influenza   Vaccine 0.5 milliLiter(s) IntraMuscular once  mesalamine ER (24-Hour) Capsule 1.5 Gram(s) Oral daily  metoprolol succinate ER 25 milliGRAM(s) Oral daily  piperacillin/tazobactam IVPB. 3.375 Gram(s) IV Intermittent every 8 hours  potassium chloride    Tablet ER 10 milliEquivalent(s) Oral daily  potassium chloride    Tablet ER 40 milliEquivalent(s) Oral once  vancomycin  IVPB 750 milliGRAM(s) IV Intermittent every 12 hours    MEDICATIONS  (PRN):  acetaminophen   Tablet .. 650 milliGRAM(s) Oral every 6 hours PRN Temp greater or equal to 38C (100.4F), Mild Pain (1 - 3)  nystatin Cream 1 Application(s) Topical two times a day PRN rash/irritation  traMADol 50 milliGRAM(s) Oral two times a day PRN Moderate Pain (4 - 6)      Vital Signs Last 24 Hrs  T(C): 37 (12 Oct 2018 04:27), Max: 37.2 (11 Oct 2018 21:49)  T(F): 98.6 (12 Oct 2018 04:27), Max: 99 (11 Oct 2018 21:49)  HR: 74 (12 Oct 2018 04:27) (74 - 79)  BP: 138/77 (12 Oct 2018 04:27) (117/79 - 138/77)  BP(mean): --  RR: 19 (12 Oct 2018 04:27) (18 - 19)  SpO2: 98% (12 Oct 2018 04:27) (98% - 100%)  CAPILLARY BLOOD GLUCOSE        I&O's Summary    11 Oct 2018 07:01  -  12 Oct 2018 07:00  --------------------------------------------------------  IN: 360 mL / OUT: 0 mL / NET: 360 mL        PHYSICAL EXAM:  HEAD:  Atraumatic, Normocephalic  NECK: Supple, No   JVD  CHEST/LUNG:   no     rales,     no,    rhonchi  HEART: Regular rate and rhythm;         murmur  ABDOMEN: Soft, Nontender, ;   EXTREMITIES:   no     edema  supra pubic  swelling/ redness, decreasing  pus  expressed   from left  mons  pubis  area  left labia  majora, no  active drainage   NEUROLOGY:  alert    LABS:                        10.0   5.6   )-----------( 164      ( 12 Oct 2018 06:06 )             30.7     10-12    135  |  100  |  11  ----------------------------<  98  3.1<L>   |  22  |  0.83    Ca    8.0<L>      12 Oct 2018 06:06                              Consultant(s) Notes Reviewed:      Care Discussed with Consultants/Other Providers: in bed   draining minimal  left labia majora,. per  gyn   no  drainage  noted  from labia  area  of  fluctuance, in supra  pubic  area/ expressed   pus. pt  feels better    REVIEW OF SYSTEMS:  GEN: no fever,    no chills  RESP: no SOB,   no cough  CVS: no chest pain,   no palpitations  GI: no abdominal pain,   no nausea,   no vomiting,   no constipation,   no diarrhea  : no dysuria,   no frequency  NEURO: no headache,   no dizziness  PSYCH: no depression,   not anxious  Derm : no rash    MEDICATIONS  (STANDING):  aspirin enteric coated 81 milliGRAM(s) Oral daily  atorvastatin 10 milliGRAM(s) Oral at bedtime  calcium carbonate    500 mG (Tums) Chewable 1 Tablet(s) Chew two times a day  famotidine    Tablet 20 milliGRAM(s) Oral daily  heparin  Injectable 5000 Unit(s) SubCutaneous every 12 hours  influenza   Vaccine 0.5 milliLiter(s) IntraMuscular once  mesalamine ER (24-Hour) Capsule 1.5 Gram(s) Oral daily  metoprolol succinate ER 25 milliGRAM(s) Oral daily  piperacillin/tazobactam IVPB. 3.375 Gram(s) IV Intermittent every 8 hours  potassium chloride    Tablet ER 10 milliEquivalent(s) Oral daily  potassium chloride    Tablet ER 40 milliEquivalent(s) Oral once  vancomycin  IVPB 750 milliGRAM(s) IV Intermittent every 12 hours    MEDICATIONS  (PRN):  acetaminophen   Tablet .. 650 milliGRAM(s) Oral every 6 hours PRN Temp greater or equal to 38C (100.4F), Mild Pain (1 - 3)  nystatin Cream 1 Application(s) Topical two times a day PRN rash/irritation  traMADol 50 milliGRAM(s) Oral two times a day PRN Moderate Pain (4 - 6)      Vital Signs Last 24 Hrs  T(C): 37 (12 Oct 2018 04:27), Max: 37.2 (11 Oct 2018 21:49)  T(F): 98.6 (12 Oct 2018 04:27), Max: 99 (11 Oct 2018 21:49)  HR: 74 (12 Oct 2018 04:27) (74 - 79)  BP: 138/77 (12 Oct 2018 04:27) (117/79 - 138/77)  BP(mean): --  RR: 19 (12 Oct 2018 04:27) (18 - 19)  SpO2: 98% (12 Oct 2018 04:27) (98% - 100%)  CAPILLARY BLOOD GLUCOSE        I&O's Summary    11 Oct 2018 07:01  -  12 Oct 2018 07:00  --------------------------------------------------------  IN: 360 mL / OUT: 0 mL / NET: 360 mL        PHYSICAL EXAM:  HEAD:  Atraumatic, Normocephalic  NECK: Supple, No   JVD  CHEST/LUNG:   no     rales,     no,    rhonchi  HEART: Regular rate and rhythm;         murmur  ABDOMEN: Soft, Nontender, ;   EXTREMITIES:   no     edema  supra pubic  swelling/ redness, decreasing  pus  expressed   from left  mons  pubis  area  left labia  majora, no  active drainage   NEUROLOGY:  alert    LABS:                        10.0   5.6   )-----------( 164      ( 12 Oct 2018 06:06 )             30.7     10-12    135  |  100  |  11  ----------------------------<  98  3.1<L>   |  22  |  0.83    Ca    8.0<L>      12 Oct 2018 06:06                              Consultant(s) Notes Reviewed:      Care Discussed with Consultants/Other Providers:

## 2018-10-13 LAB
ANION GAP SERPL CALC-SCNC: 13 MMOL/L — SIGNIFICANT CHANGE UP (ref 5–17)
BUN SERPL-MCNC: 9 MG/DL — SIGNIFICANT CHANGE UP (ref 7–23)
CALCIUM SERPL-MCNC: 8.1 MG/DL — LOW (ref 8.4–10.5)
CHLORIDE SERPL-SCNC: 101 MMOL/L — SIGNIFICANT CHANGE UP (ref 96–108)
CO2 SERPL-SCNC: 23 MMOL/L — SIGNIFICANT CHANGE UP (ref 22–31)
CREAT SERPL-MCNC: 0.85 MG/DL — SIGNIFICANT CHANGE UP (ref 0.5–1.3)
FERRITIN SERPL-MCNC: 255 NG/ML — HIGH (ref 15–150)
GLUCOSE SERPL-MCNC: 95 MG/DL — SIGNIFICANT CHANGE UP (ref 70–99)
HCT VFR BLD CALC: 30.9 % — LOW (ref 34.5–45)
HGB BLD-MCNC: 9.2 G/DL — LOW (ref 11.5–15.5)
IRON SATN MFR SERPL: 13 % — LOW (ref 14–50)
IRON SATN MFR SERPL: 25 UG/DL — LOW (ref 30–160)
MCHC RBC-ENTMCNC: 24.1 PG — LOW (ref 27–34)
MCHC RBC-ENTMCNC: 29.8 GM/DL — LOW (ref 32–36)
MCV RBC AUTO: 80.9 FL — SIGNIFICANT CHANGE UP (ref 80–100)
OB PNL STL: NEGATIVE — SIGNIFICANT CHANGE UP
PLATELET # BLD AUTO: 187 K/UL — SIGNIFICANT CHANGE UP (ref 150–400)
POTASSIUM SERPL-MCNC: 3.4 MMOL/L — LOW (ref 3.5–5.3)
POTASSIUM SERPL-SCNC: 3.4 MMOL/L — LOW (ref 3.5–5.3)
RBC # BLD: 3.82 M/UL — SIGNIFICANT CHANGE UP (ref 3.8–5.2)
RBC # FLD: 16.5 % — HIGH (ref 10.3–14.5)
SODIUM SERPL-SCNC: 137 MMOL/L — SIGNIFICANT CHANGE UP (ref 135–145)
TIBC SERPL-MCNC: 197 UG/DL — LOW (ref 220–430)
UIBC SERPL-MCNC: 172 UG/DL — SIGNIFICANT CHANGE UP (ref 110–370)
VANCOMYCIN TROUGH SERPL-MCNC: 9.6 UG/ML — LOW (ref 10–20)
WBC # BLD: 5.42 K/UL — SIGNIFICANT CHANGE UP (ref 3.8–10.5)
WBC # FLD AUTO: 5.42 K/UL — SIGNIFICANT CHANGE UP (ref 3.8–10.5)

## 2018-10-13 RX ORDER — VANCOMYCIN HCL 1 G
1000 VIAL (EA) INTRAVENOUS EVERY 12 HOURS
Qty: 0 | Refills: 0 | Status: DISCONTINUED | OUTPATIENT
Start: 2018-10-13 | End: 2018-10-14

## 2018-10-13 RX ORDER — POTASSIUM CHLORIDE 20 MEQ
40 PACKET (EA) ORAL ONCE
Qty: 0 | Refills: 0 | Status: COMPLETED | OUTPATIENT
Start: 2018-10-13 | End: 2018-10-13

## 2018-10-13 RX ORDER — MORPHINE SULFATE 50 MG/1
2 CAPSULE, EXTENDED RELEASE ORAL ONCE
Qty: 0 | Refills: 0 | Status: DISCONTINUED | OUTPATIENT
Start: 2018-10-13 | End: 2018-10-13

## 2018-10-13 RX ORDER — LACTOBACILLUS ACIDOPHILUS 100MM CELL
1 CAPSULE ORAL
Qty: 0 | Refills: 0 | Status: DISCONTINUED | OUTPATIENT
Start: 2018-10-13 | End: 2018-10-16

## 2018-10-13 RX ORDER — OXYCODONE HYDROCHLORIDE 5 MG/1
5 TABLET ORAL ONCE
Qty: 0 | Refills: 0 | Status: DISCONTINUED | OUTPATIENT
Start: 2018-10-13 | End: 2018-10-13

## 2018-10-13 RX ORDER — POTASSIUM CHLORIDE 20 MEQ
20 PACKET (EA) ORAL DAILY
Qty: 0 | Refills: 0 | Status: DISCONTINUED | OUTPATIENT
Start: 2018-10-14 | End: 2018-10-16

## 2018-10-13 RX ADMIN — PIPERACILLIN AND TAZOBACTAM 25 GRAM(S): 4; .5 INJECTION, POWDER, LYOPHILIZED, FOR SOLUTION INTRAVENOUS at 13:36

## 2018-10-13 RX ADMIN — Medication 650 MILLIGRAM(S): at 02:27

## 2018-10-13 RX ADMIN — Medication 40 MILLIEQUIVALENT(S): at 07:58

## 2018-10-13 RX ADMIN — HEPARIN SODIUM 5000 UNIT(S): 5000 INJECTION INTRAVENOUS; SUBCUTANEOUS at 17:24

## 2018-10-13 RX ADMIN — Medication 81 MILLIGRAM(S): at 12:30

## 2018-10-13 RX ADMIN — Medication 650 MILLIGRAM(S): at 03:15

## 2018-10-13 RX ADMIN — Medication 250 MILLIGRAM(S): at 05:14

## 2018-10-13 RX ADMIN — Medication 650 MILLIGRAM(S): at 11:33

## 2018-10-13 RX ADMIN — ATORVASTATIN CALCIUM 10 MILLIGRAM(S): 80 TABLET, FILM COATED ORAL at 21:12

## 2018-10-13 RX ADMIN — MORPHINE SULFATE 2 MILLIGRAM(S): 50 CAPSULE, EXTENDED RELEASE ORAL at 07:50

## 2018-10-13 RX ADMIN — Medication 650 MILLIGRAM(S): at 10:49

## 2018-10-13 RX ADMIN — Medication 250 MILLIGRAM(S): at 17:25

## 2018-10-13 RX ADMIN — Medication 1.5 GRAM(S): at 12:29

## 2018-10-13 RX ADMIN — PIPERACILLIN AND TAZOBACTAM 25 GRAM(S): 4; .5 INJECTION, POWDER, LYOPHILIZED, FOR SOLUTION INTRAVENOUS at 05:14

## 2018-10-13 RX ADMIN — MORPHINE SULFATE 2 MILLIGRAM(S): 50 CAPSULE, EXTENDED RELEASE ORAL at 08:25

## 2018-10-13 RX ADMIN — HEPARIN SODIUM 5000 UNIT(S): 5000 INJECTION INTRAVENOUS; SUBCUTANEOUS at 05:14

## 2018-10-13 RX ADMIN — Medication 25 MILLIGRAM(S): at 05:14

## 2018-10-13 RX ADMIN — PIPERACILLIN AND TAZOBACTAM 25 GRAM(S): 4; .5 INJECTION, POWDER, LYOPHILIZED, FOR SOLUTION INTRAVENOUS at 21:12

## 2018-10-13 RX ADMIN — Medication 1 TABLET(S): at 17:23

## 2018-10-13 NOTE — PROGRESS NOTE ADULT - ASSESSMENT
73y female with a PMH significant for HTN, HLD, recent diagnosis of ulcerative colitis on mesalamine with prednisone taper   Admitted with left groin/suprapubic cellulitis that started on 10/03/18. Found febrile in hospital to 101.7 with leukocytosis of 16K  Prednisone stopped on 10/09/18  Fevers & leukocytosis resolved  Left groin cellulitis improved but she developed purulent drainage from a small opening at the left labia majora  S/p bedside I & D by Gyn   Wound cx now revealing Staph aureus   Vanco trough of 9.6 is still low will increase dose 1 gm q 12 hrs    PLAN:   Continue zosyn & vancomycin for now  Follow cx & further sensitivity of staph aureus   Check vanco trough in after the 3rd dose  Add Bacid - pt had 2 loose BMs today, if diarrhea worsens - 4 or more BMs - check stools for C diff

## 2018-10-13 NOTE — PROGRESS NOTE ADULT - ASSESSMENT
pt  admitted  with  h/o abd  pain/ / left  groin pain       , with elevated wbc/  hyponatremia/  dehydration  pt with cellulitis,  of supra  pubic area    doppler legs, no dvt    elevated wbc, from  steroid,  and from  cellulitis/ supra pubic area  wbc  decreasing from  17,000 to 5,000   surg  eval , noted, no intervention  seen by gyn/ packing till drainage  stops  no drainage from vulvar area  on zosyn/ Vanco  , defer duration to ID   ct with no colitis,  will   stop prednisone/ also  will promote  wound  healing  hyponatremia, resolved/ hypokalemia

## 2018-10-13 NOTE — CHART NOTE - NSCHARTNOTEFT_GEN_A_CORE
Patient is 73y with groin cellulitis and left labial abscess. Pt seen at bedside and the wound packing was removed, a moderate amount of serosanguinous discharge was expressed from wound. Iodoform packing was replaced. Pt notes improvement in size of abscess and decreased discharge compared to yesterday. Pt denies fever/chills, nausea/vomiting, CP/SOB.     Vital Signs Last 24 Hrs  T(C): 36.6 (13 Oct 2018 07:58), Max: 37 (12 Oct 2018 21:25)  T(F): 97.9 (13 Oct 2018 07:58), Max: 98.6 (12 Oct 2018 21:25)  HR: 71 (13 Oct 2018 07:58) (70 - 76)  BP: 130/84 (13 Oct 2018 07:58) (119/81 - 149/73)  BP(mean): --  RR: 18 (13 Oct 2018 07:58) (17 - 20)  SpO2: 98% (13 Oct 2018 07:58) (98% - 99%)    I&O's Detail    12 Oct 2018 07:01  -  13 Oct 2018 07:00  --------------------------------------------------------  IN:    Solution: 250 mL    Solution: 200 mL  Total IN: 450 mL    OUT:    Voided: 3 mL  Total OUT: 3 mL    Total NET: 447 mL        PE:  Gen: Comfortable, NAD  Abd: obese, soft, nontender  : area of erythema & induration along left half of mons, tracking down to involve left labia majora. Mildly tender to palpation, no fluctuance. Pus draining from subcentimeter opening on left labia majora only with expression.   Ext: warm/well perfused, no calf tenderness bilaterally    Labs:                        9.2    5.42  )-----------( 187      ( 13 Oct 2018 08:23 )             30.9                         10.0   5.6   )-----------( 164      ( 12 Oct 2018 06:06 )             30.7             MEDICATIONS  (STANDING):  aspirin enteric coated 81 milliGRAM(s) Oral daily  atorvastatin 10 milliGRAM(s) Oral at bedtime  famotidine    Tablet 20 milliGRAM(s) Oral daily  heparin  Injectable 5000 Unit(s) SubCutaneous every 12 hours  influenza   Vaccine 0.5 milliLiter(s) IntraMuscular once  mesalamine ER (24-Hour) Capsule 1.5 Gram(s) Oral daily  metoprolol succinate ER 25 milliGRAM(s) Oral daily  piperacillin/tazobactam IVPB. 3.375 Gram(s) IV Intermittent every 8 hours  vancomycin  IVPB 750 milliGRAM(s) IV Intermittent every 12 hours      Assessment:  72yo  postmenopausal s/p MARTIN/BS @ 46yo with with h/o HTN, HLD, and newly diagnosed ulcerative colitis (in past month) on mesalamine and prednisone taper admitted with left groin cellulitis. Pt now with spontaneously draining left mons/labia abscess.      Plan:  -Change iodoform wound packing daily  -F/u wound cultures  -Recommend warm compresses  -C/w antibiotics per primary team        CJ Mcnair   -------------------------------------------------------------------------------------------------------------

## 2018-10-13 NOTE — PROGRESS NOTE ADULT - SUBJECTIVE AND OBJECTIVE BOX
CC: f/u for left groin cellulitis & pubic abscess     Patient reports ongoing pain     REVIEW OF SYSTEMS:  All other review of systems negative (Comprehensive ROS) except left pubic area is tender & had two loose BMs today     Antimicrobials Day #  : 6  piperacillin/tazobactam IVPB. 3.375 Gram(s) IV Intermittent every 8 hours  vancomycin  IVPB 750 milliGRAM(s) IV Intermittent every 12 hours    Other Medications Reviewed    T(F): 97.9 (10-13-18 @ 07:58), Max: 98.6 (10-12-18 @ 21:25)  HR: 71 (10-13-18 @ 07:58)  BP: 130/84 (10-13-18 @ 07:58)  RR: 18 (10-13-18 @ 07:58)  SpO2: 98% (10-13-18 @ 07:58)  Wt(kg): --    PHYSICAL EXAM:  General: alert, no acute distress  Eyes:  anicteric, no conjunctival injection, no discharge  Oropharynx: no lesions or injection 	  Neck: supple, without adenopathy  Lungs: clear to auscultation  Heart: regular rate and rhythm; no murmur, rubs or gallops  Abdomen: soft, nondistended, nontender, without mass or organomegaly  Skin: Left groin erythema & warmth resolved. Purulent drainage from pubic area improved, current drainage is largely bloody, improving surrounding edema, induration, erythema & TTP  Extremities: no clubbing, cyanosis, or edema  Neurologic: alert, oriented, moves all extremities    LAB RESULTS:                        9.2    5.42  )-----------( 187      ( 13 Oct 2018 08:23 )             30.9     10-13    137  |  101  |  9   ----------------------------<  95  3.4<L>   |  23  |  0.85    Ca    8.1<L>      13 Oct 2018 06:44      MICROBIOLOGY:  RECENT CULTURES:  10-12 @ 11:31 .Abscess left labia     Moderate Staphylococcus aureus    RADIOLOGY REVIEWED:

## 2018-10-13 NOTE — PROGRESS NOTE ADULT - SUBJECTIVE AND OBJECTIVE BOX
afebrile, pt improving  REVIEW OF SYSTEMS:  GEN: no fever,    no chills  RESP: no SOB,   no cough  CVS: no chest pain,   no palpitations  GI: no abdominal pain,   no nausea,   no vomiting,   no constipation,   no diarrhea  : no dysuria,   no frequency  NEURO: no headache,   no dizziness  PSYCH: no depression,   not anxious  Derm : no rash    MEDICATIONS  (STANDING):  aspirin enteric coated 81 milliGRAM(s) Oral daily  atorvastatin 10 milliGRAM(s) Oral at bedtime  calcium carbonate    500 mG (Tums) Chewable 1 Tablet(s) Chew two times a day  famotidine    Tablet 20 milliGRAM(s) Oral daily  heparin  Injectable 5000 Unit(s) SubCutaneous every 12 hours  influenza   Vaccine 0.5 milliLiter(s) IntraMuscular once  mesalamine ER (24-Hour) Capsule 1.5 Gram(s) Oral daily  metoprolol succinate ER 25 milliGRAM(s) Oral daily  morphine  - Injectable 2 milliGRAM(s) IV Push once  piperacillin/tazobactam IVPB. 3.375 Gram(s) IV Intermittent every 8 hours  potassium chloride    Tablet ER 10 milliEquivalent(s) Oral daily  vancomycin  IVPB 750 milliGRAM(s) IV Intermittent every 12 hours    MEDICATIONS  (PRN):  acetaminophen   Tablet .. 650 milliGRAM(s) Oral every 6 hours PRN Temp greater or equal to 38C (100.4F), Mild Pain (1 - 3)  nystatin Cream 1 Application(s) Topical two times a day PRN rash/irritation  traMADol 50 milliGRAM(s) Oral two times a day PRN Moderate Pain (4 - 6)      Vital Signs Last 24 Hrs  T(C): 36.7 (13 Oct 2018 05:11), Max: 37 (12 Oct 2018 21:25)  T(F): 98 (13 Oct 2018 05:11), Max: 98.6 (12 Oct 2018 21:25)  HR: 70 (13 Oct 2018 05:11) (70 - 76)  BP: 149/73 (13 Oct 2018 05:11) (119/81 - 149/73)  BP(mean): --  RR: 18 (13 Oct 2018 05:11) (17 - 20)  SpO2: 98% (13 Oct 2018 05:11) (98% - 99%)  CAPILLARY BLOOD GLUCOSE        I&O's Summary    12 Oct 2018 07:01  -  13 Oct 2018 07:00  --------------------------------------------------------  IN: 0 mL / OUT: 1 mL / NET: -1 mL        PHYSICAL EXAM:  HEAD:  Atraumatic, Normocephalic  NECK: Supple, No   JVD  CHEST/LUNG:   no     rales,     no,    rhonchi  HEART: Regular rate and rhythm;         murmur  ABDOMEN: Soft, Nontender, ;   EXTREMITIES:   no     edema  redness of  left mons pubis, decreasing/  s/p  drainage of pus, yesterday  NEUROLOGY:  alert    LABS:                        10.0   5.6   )-----------( 164      ( 12 Oct 2018 06:06 )             30.7     10-13    137  |  101  |  9   ----------------------------<  95  3.4<L>   |  23  |  0.85    Ca    8.1<L>      13 Oct 2018 06:44                              Consultant(s) Notes Reviewed:      Care Discussed with Consultants/Other Providers:

## 2018-10-13 NOTE — PROGRESS NOTE ADULT - SUBJECTIVE AND OBJECTIVE BOX
- Patient seen and examined.  - In summary, patient is a 73 year old woman who presented with LEFT GROIN PAIN (08 Oct 2018 09:58)  - Today, patient is without complaints.         *****MEDICATIONS:    MEDICATIONS  (STANDING):  aspirin enteric coated 81 milliGRAM(s) Oral daily  atorvastatin 10 milliGRAM(s) Oral at bedtime  famotidine    Tablet 20 milliGRAM(s) Oral daily  heparin  Injectable 5000 Unit(s) SubCutaneous every 12 hours  influenza   Vaccine 0.5 milliLiter(s) IntraMuscular once  mesalamine ER (24-Hour) Capsule 1.5 Gram(s) Oral daily  metoprolol succinate ER 25 milliGRAM(s) Oral daily  piperacillin/tazobactam IVPB. 3.375 Gram(s) IV Intermittent every 8 hours  vancomycin  IVPB 750 milliGRAM(s) IV Intermittent every 12 hours    MEDICATIONS  (PRN):  acetaminophen   Tablet .. 650 milliGRAM(s) Oral every 6 hours PRN Temp greater or equal to 38C (100.4F), Mild Pain (1 - 3)  nystatin Cream 1 Application(s) Topical two times a day PRN rash/irritation  traMADol 50 milliGRAM(s) Oral two times a day PRN Moderate Pain (4 - 6)             ***** REVIEW OF SYSTEM:  GEN: no fever, no chills, no pain  RESP: no SOB, no cough, no sputum  CVS: no chest pain, no palpitations, no edema  GI: no abdominal pain, no nausea, no vomiting, no constipation, no diarrhea  : no dysuria, no frequency  NEURO: no headache, no dizziness  PSYCH: no depression, not anxious  Derm : no itching, no rash         ***** VITAL SIGNS:    T(F): 97.9 (10-13-18 @ 07:58), Max: 98.6 (10-12-18 @ 21:25)  HR: 71 (10-13-18 @ 07:58) (70 - 76)  BP: 130/84 (10-13-18 @ 07:58) (119/81 - 149/73)  RR: 18 (10-13-18 @ 07:58) (17 - 18)  SpO2: 98% (10-13-18 @ 07:58) (98% - 98%)  Wt(kg): --  ,   I&O's Summary    12 Oct 2018 07:01  -  13 Oct 2018 07:00  --------------------------------------------------------  IN: 450 mL / OUT: 3 mL / NET: 447 mL                 *****PHYSICAL EXAM:  GEN: A&O X 3 , NAD , comfortable  HEENT: NCAT, EOMI, MMM, no icterus  NECK: Supple, No JVD  CVS: S1S2 , regular , No M/R/G appreciated  PULM: CTA B/L,  no W/R/R appreciated  ABD.: soft. non tender, non distended,  bowel sounds present  Extrem: intact pulses , no edema noted  Derm: No rash or ecchymosis noted  PSYCH: normal mood, no depression, not anxious         *****LAB AND IMAGIN.2    5.42  )-----------( 187      ( 13 Oct 2018 08:23 )             30.9               10-    137  |  101  |  9   ----------------------------<  95  3.4<L>   |  23  |  0.85    Ca    8.1<L>      13 Oct 2018 06:44            [All pertinent recent Imaging/Reports reviewed]  < from: Transthoracic Echocardiogram (10.09.18 @ 15:04) >  Conclusions:  1. Aortic valve not well visualized; appears calcified.  2. Endocardium not well visualized; grossly preserved  left  ventricular systolic function.  3. The right ventricle is not well visualized; grossly  normal right ventricular systolic function.    < end of copied text >         *****A S S E S S M E N T   A N D   P L A N :  73F with fever, pubic area cellulitis  no  dvt on LE doppler  echo noted  continue current meds  f/u gyn  abx per ID    __________________________  A. DEBBI Gerber.

## 2018-10-14 DIAGNOSIS — R10.32 LEFT LOWER QUADRANT PAIN: ICD-10-CM

## 2018-10-14 LAB
-  AMPICILLIN/SULBACTAM: SIGNIFICANT CHANGE UP
-  CEFAZOLIN: SIGNIFICANT CHANGE UP
-  CLINDAMYCIN: SIGNIFICANT CHANGE UP
-  DAPTOMYCIN: SIGNIFICANT CHANGE UP
-  ERYTHROMYCIN: SIGNIFICANT CHANGE UP
-  GENTAMICIN: SIGNIFICANT CHANGE UP
-  LINEZOLID: SIGNIFICANT CHANGE UP
-  OXACILLIN: SIGNIFICANT CHANGE UP
-  PENICILLIN: SIGNIFICANT CHANGE UP
-  RIFAMPIN: SIGNIFICANT CHANGE UP
-  TETRACYCLINE: SIGNIFICANT CHANGE UP
-  TRIMETHOPRIM/SULFAMETHOXAZOLE: SIGNIFICANT CHANGE UP
-  VANCOMYCIN: SIGNIFICANT CHANGE UP
ANION GAP SERPL CALC-SCNC: 11 MMOL/L — SIGNIFICANT CHANGE UP (ref 5–17)
BUN SERPL-MCNC: 9 MG/DL — SIGNIFICANT CHANGE UP (ref 7–23)
CALCIUM SERPL-MCNC: 8.9 MG/DL — SIGNIFICANT CHANGE UP (ref 8.4–10.5)
CHLORIDE SERPL-SCNC: 102 MMOL/L — SIGNIFICANT CHANGE UP (ref 96–108)
CO2 SERPL-SCNC: 23 MMOL/L — SIGNIFICANT CHANGE UP (ref 22–31)
CREAT SERPL-MCNC: 0.88 MG/DL — SIGNIFICANT CHANGE UP (ref 0.5–1.3)
GLUCOSE SERPL-MCNC: 92 MG/DL — SIGNIFICANT CHANGE UP (ref 70–99)
METHOD TYPE: SIGNIFICANT CHANGE UP
POTASSIUM SERPL-MCNC: 4 MMOL/L — SIGNIFICANT CHANGE UP (ref 3.5–5.3)
POTASSIUM SERPL-SCNC: 4 MMOL/L — SIGNIFICANT CHANGE UP (ref 3.5–5.3)
SODIUM SERPL-SCNC: 136 MMOL/L — SIGNIFICANT CHANGE UP (ref 135–145)

## 2018-10-14 RX ORDER — MORPHINE SULFATE 50 MG/1
2 CAPSULE, EXTENDED RELEASE ORAL ONCE
Qty: 0 | Refills: 0 | Status: DISCONTINUED | OUTPATIENT
Start: 2018-10-14 | End: 2018-10-14

## 2018-10-14 RX ADMIN — ATORVASTATIN CALCIUM 10 MILLIGRAM(S): 80 TABLET, FILM COATED ORAL at 21:21

## 2018-10-14 RX ADMIN — Medication 650 MILLIGRAM(S): at 01:04

## 2018-10-14 RX ADMIN — Medication 650 MILLIGRAM(S): at 22:43

## 2018-10-14 RX ADMIN — Medication 1 TABLET(S): at 17:13

## 2018-10-14 RX ADMIN — Medication 650 MILLIGRAM(S): at 23:30

## 2018-10-14 RX ADMIN — PIPERACILLIN AND TAZOBACTAM 25 GRAM(S): 4; .5 INJECTION, POWDER, LYOPHILIZED, FOR SOLUTION INTRAVENOUS at 05:28

## 2018-10-14 RX ADMIN — Medication 250 MILLIGRAM(S): at 05:29

## 2018-10-14 RX ADMIN — Medication 650 MILLIGRAM(S): at 14:00

## 2018-10-14 RX ADMIN — Medication 650 MILLIGRAM(S): at 01:34

## 2018-10-14 RX ADMIN — HEPARIN SODIUM 5000 UNIT(S): 5000 INJECTION INTRAVENOUS; SUBCUTANEOUS at 17:13

## 2018-10-14 RX ADMIN — Medication 25 MILLIGRAM(S): at 05:28

## 2018-10-14 RX ADMIN — HEPARIN SODIUM 5000 UNIT(S): 5000 INJECTION INTRAVENOUS; SUBCUTANEOUS at 05:28

## 2018-10-14 RX ADMIN — Medication 81 MILLIGRAM(S): at 12:03

## 2018-10-14 RX ADMIN — Medication 650 MILLIGRAM(S): at 13:03

## 2018-10-14 RX ADMIN — Medication 1 TABLET(S): at 17:17

## 2018-10-14 RX ADMIN — Medication 20 MILLIEQUIVALENT(S): at 12:03

## 2018-10-14 RX ADMIN — Medication 1.5 GRAM(S): at 12:03

## 2018-10-14 RX ADMIN — MORPHINE SULFATE 2 MILLIGRAM(S): 50 CAPSULE, EXTENDED RELEASE ORAL at 07:58

## 2018-10-14 RX ADMIN — Medication 1 TABLET(S): at 08:53

## 2018-10-14 RX ADMIN — MORPHINE SULFATE 2 MILLIGRAM(S): 50 CAPSULE, EXTENDED RELEASE ORAL at 08:28

## 2018-10-14 NOTE — PROGRESS NOTE ADULT - SUBJECTIVE AND OBJECTIVE BOX
HD#8:    Patient seen and examined at bedside, no acute overnight events. Pt notes continued clear drainage from L groin. Pt complains of left leg swelling x1d and lateral left lower quadrant pain improved with mediations. Denies leg pain.   Patient is ambulating, passing flatus, voiding spontaneously, and tolerating regular diet. Denies CP, SOB, N/V, fevers, and chills.    Vital Signs Last 24 Hours  T(C): 36.8 (10-14-18 @ 07:47), Max: 37.1 (10-14-18 @ 00:10)  HR: 66 (10-14-18 @ 07:47) (65 - 79)  BP: 147/80 (10-14-18 @ 07:47) (129/78 - 147/80)  RR: 18 (10-14-18 @ 07:47) (18 - 18)  SpO2: 98% (10-14-18 @ 07:47) (95% - 98%)    I&O's Summary    13 Oct 2018 07:01  -  14 Oct 2018 07:00  --------------------------------------------------------  IN: 1520 mL / OUT: 0 mL / NET: 1520 mL        Physical Exam:  Gen: Comfortable, NAD  Abd: obese, soft, mild tenderness to palpation in LLQ with area of induration in LLQ approx 5cm by 2.5cm.   : area of erythema & induration along left half of mons, tracking down to involve left labia majora and upwards towards large area of induration in LLQ. Tender to palpation, no fluctuance. No pus actively draining from subcentimeter opening on left labia majora. Small amount of pus and serosanguinous fluid expressed from opening.  Ext: Pitting edema to knee of left leg; negative Houmans sign bilaterally, warm/well perfused LE bilaterally    Labs:                        9.2    5.42  )-----------( 187      ( 13 Oct 2018 08:23 )             30.9   baso x      eos x      imm gran x      lymph x      mono x      poly x                            10.0   5.6   )-----------( 164      ( 12 Oct 2018 06:06 )             30.7   baso 0.2    eos 5.4    imm gran x      lymph 12.0   mono 8.3    poly 74.1     10-14    136  |  102  |  9   ----------------------------<  92  4.0   |  23  |  0.88    Ca    8.9      14 Oct 2018 07:23          MEDICATIONS  (STANDING):  aspirin enteric coated 81 milliGRAM(s) Oral daily  atorvastatin 10 milliGRAM(s) Oral at bedtime  famotidine    Tablet 20 milliGRAM(s) Oral daily  heparin  Injectable 5000 Unit(s) SubCutaneous every 12 hours  influenza   Vaccine 0.5 milliLiter(s) IntraMuscular once  lactobacillus acidophilus 1 Tablet(s) Oral two times a day with meals  mesalamine ER (24-Hour) Capsule 1.5 Gram(s) Oral daily  metoprolol succinate ER 25 milliGRAM(s) Oral daily  piperacillin/tazobactam IVPB. 3.375 Gram(s) IV Intermittent every 8 hours  potassium chloride    Tablet ER 20 milliEquivalent(s) Oral daily  vancomycin  IVPB 1000 milliGRAM(s) IV Intermittent every 12 hours    MEDICATIONS  (PRN):  acetaminophen   Tablet .. 650 milliGRAM(s) Oral every 6 hours PRN Temp greater or equal to 38C (100.4F), Mild Pain (1 - 3)  nystatin Cream 1 Application(s) Topical two times a day PRN rash/irritation  traMADol 50 milliGRAM(s) Oral two times a day PRN Moderate Pain (4 - 6)

## 2018-10-14 NOTE — PROGRESS NOTE ADULT - SUBJECTIVE AND OBJECTIVE BOX
- Patient seen and examined.  - In summary, patient is a 73 year old woman who presented with LEFT GROIN PAIN (08 Oct 2018 09:58)  - Today, patient is without complaints.         *****MEDICATIONS:    MEDICATIONS  (STANDING):  aspirin enteric coated 81 milliGRAM(s) Oral daily  atorvastatin 10 milliGRAM(s) Oral at bedtime  famotidine    Tablet 20 milliGRAM(s) Oral daily  heparin  Injectable 5000 Unit(s) SubCutaneous every 12 hours  influenza   Vaccine 0.5 milliLiter(s) IntraMuscular once  lactobacillus acidophilus 1 Tablet(s) Oral two times a day with meals  mesalamine ER (24-Hour) Capsule 1.5 Gram(s) Oral daily  metoprolol succinate ER 25 milliGRAM(s) Oral daily  piperacillin/tazobactam IVPB. 3.375 Gram(s) IV Intermittent every 8 hours  potassium chloride    Tablet ER 20 milliEquivalent(s) Oral daily  vancomycin  IVPB 1000 milliGRAM(s) IV Intermittent every 12 hours    MEDICATIONS  (PRN):  acetaminophen   Tablet .. 650 milliGRAM(s) Oral every 6 hours PRN Temp greater or equal to 38C (100.4F), Mild Pain (1 - 3)  nystatin Cream 1 Application(s) Topical two times a day PRN rash/irritation  traMADol 50 milliGRAM(s) Oral two times a day PRN Moderate Pain (4 - 6)               ***** REVIEW OF SYSTEM:  GEN: no fever, no chills, no pain  RESP: no SOB, no cough, no sputum  CVS: no chest pain, no palpitations, no edema  GI: no abdominal pain, no nausea, no vomiting, no constipation, no diarrhea  : no dysuria, no frequency  NEURO: no headache, no dizziness  PSYCH: no depression, not anxious  Derm : no itching, no rash         ***** VITAL SIGNS:    T(F): 98.2 (10-14-18 @ 07:47), Max: 98.7 (10-14-18 @ 00:10)  HR: 66 (10-14-18 @ 07:47) (65 - 79)  BP: 147/80 (10-14-18 @ 07:47) (129/78 - 147/80)  RR: 18 (10-14-18 @ 07:47) (18 - 18)  SpO2: 98% (10-14-18 @ 07:47) (95% - 98%)  Wt(kg): --  ,   I&O's Summary    13 Oct 2018 07:01  -  14 Oct 2018 07:00  --------------------------------------------------------  IN: 1520 mL / OUT: 0 mL / NET: 1520 mL                   *****PHYSICAL EXAM:  GEN: A&O X 3 , NAD , comfortable  HEENT: NCAT, EOMI, MMM, no icterus  NECK: Supple, No JVD  CVS: S1S2 , regular , No M/R/G appreciated  PULM: CTA B/L,  no W/R/R appreciated  ABD.: soft. non tender, non distended,  bowel sounds present  Extrem: intact pulses , no edema noted  Derm: No rash or ecchymosis noted  PSYCH: normal mood, no depression, not anxious         *****LAB AND IMAGIN.2    5.42  )-----------( 187      ( 13 Oct 2018 08:23 )             30.9               10-14    136  |  102  |  9   ----------------------------<  92  4.0   |  23  |  0.88    Ca    8.9      14 Oct 2018 07:23        [All pertinent recent Imaging/Reports reviewed]  < from: Transthoracic Echocardiogram (10.09.18 @ 15:04) >  Conclusions:  1. Aortic valve not well visualized; appears calcified.  2. Endocardium not well visualized; grossly preserved  left  ventricular systolic function.  3. The right ventricle is not well visualized; grossly  normal right ventricular systolic function.    < end of copied text >         *****A S S E S S M E N T   A N D   P L A N :  73F with fever, pubic area cellulitis  no  dvt on LE doppler  echo noted  continue current meds  abx per ID  f/u gyn    __________________________  GO Gerber D.O.

## 2018-10-14 NOTE — PROGRESS NOTE ADULT - PROBLEM SELECTOR PLAN 1
- abscess cx: staph aureus (prelim)  - wound packed with iodoform. GYN will change packing daily; no pus actively draining.   - recommend warm compresses  - continue antibiotics per primary team.  - recommend repeat imaging to see if collection amenable to IR drainage  - consider repeat dopplars to assess for DVT given new onset unilateral left LE edema     d/w Dr. Yahir Mcnair PGY2

## 2018-10-14 NOTE — PROGRESS NOTE ADULT - ASSESSMENT
72yo  postmenopausal s/p MARTIN/BS @ 48yo with with h/o HTN, HLD, and newly diagnosed ulcerative colitis (in past month) on mesalamine and prednisone taper admitted with left groin cellulitis. Pt now with spontaneously draining left mons/labia abscess.

## 2018-10-14 NOTE — CHART NOTE - NSCHARTNOTEFT_GEN_A_CORE
Noted MRSA in wound today. Discussed findings with Dr. Farmer. Contact precautions for now as noted drainage.

## 2018-10-14 NOTE — PROGRESS NOTE ADULT - ASSESSMENT
73y female with a PMH significant for HTN, HLD, recent diagnosis of ulcerative colitis on mesalamine with prednisone taper   Admitted with left groin/suprapubic cellulitis that started on 10/03/18. Found febrile in hospital to 101.7 with leukocytosis of 16K  Prednisone stopped on 10/09/18  Fevers & leukocytosis resolved  Left groin cellulitis improved but she developed purulent drainage from a small opening at the left labia majora  S/p bedside I & D by Gyn   Vanco trough of 9.6 is still low will increase dose 1 gm q 12 hrs  Wound cx now with MRSA     PLAN:   Stop zosyn & vancomycin   Start Bactrim 1 DS BID for pubic cellulitis   Bacid added   If diarrhea persists check stools for C diff  Anticipate an additional 1 wk course of antibiotics - PO Bactrim upon d/c home with continued follow up with surgery team for local wound care   Case d/w Dr. Garcia earlier today

## 2018-10-14 NOTE — PROGRESS NOTE ADULT - ASSESSMENT
pt  admitted  with  h/o abd  pain/ / left  groin pain       , with elevated wbc/  hyponatremia/  dehydration  pt with cellulitis,  of supra  pubic area    doppler legs, no dvt    elevated wbc, from  steroid,  and from  cellulitis/ supra pubic area  wbc  decreasing from  17,000 to 5,000   surg  eval , noted, no intervention  seen by gyn/ packing  in wound/  no drainage  now  no drainage from vulvar area  on zosyn/ Vanco/   defer duration to ID   wound  c/s  with S aurues  anemia  with low iron ans  saturation  pt will need  gi w/p  when stable, as an op/ guaiac negative now pt  admitted  with  h/o abd  pain/ / left  groin pain       , with elevated wbc/  hyponatremia/  dehydration  pt with cellulitis,  of supra  pubic area    doppler legs, no dvt    elevated wbc, from  steroid,  and from  cellulitis/ supra pubic area  wbc  decreasing from  17,000 to 5,000   surg  eval , noted, no intervention  seen by gyn/ packing  in wound/  no drainage  now  no drainage from vulvar area  on zosyn/ Vanco/   defer duration to ID   wound  c/s  with S aurues  anemia  with low iron and  saturation / guaiac negative now/  s/p very recent colonoscopy, and dx  with colitis pt  admitted  with  h/o abd  pain/ / left  groin pain       , with elevated wbc/  hyponatremia/  dehydration  pt with cellulitis,  of supra  pubic area    doppler legs, no dvt    elevated wbc, from  steroid,  and from  cellulitis/ supra pubic area  wbc  decreasing from  17,000 to 5,000   surg  eval , noted, no intervention  seen by gyn/ packing  in wound/  no drainage  now/   pt refusing   further   imaging,a  s f/p  no drainage from vulvar area  on zosyn/ Vanco/   defer duration to ID   wound  c/s  with S aurues  anemia  with low iron and  saturation / guaiac negative now/  s/p very recent colonoscopy, and dx  with colitis

## 2018-10-14 NOTE — PROGRESS NOTE ADULT - SUBJECTIVE AND OBJECTIVE BOX
CC: f/u for left groin cellulitis & pubic abscess     Patient reports that she wants to go home now     REVIEW OF SYSTEMS:  All other review of systems negative (Comprehensive ROS)    Antimicrobials Day #  : 7  piperacillin/tazobactam IVPB. 3.375 Gram(s) IV Intermittent every 8 hours  vancomycin  IVPB 1000 milliGRAM(s) IV Intermittent every 12 hours    Other Medications Reviewed    T(F): 98.2 (10-14-18 @ 07:47), Max: 98.7 (10-14-18 @ 00:10)  HR: 66 (10-14-18 @ 07:47)  BP: 147/80 (10-14-18 @ 07:47)  RR: 18 (10-14-18 @ 07:47)  SpO2: 98% (10-14-18 @ 07:47)  Wt(kg): --    PHYSICAL EXAM:  General: alert, no acute distress  Eyes:  anicteric, no conjunctival injection, no discharge  Oropharynx: no lesions or injection 	  Neck: supple, without adenopathy  Lungs: clear to auscultation  Heart: regular rate and rhythm; no murmur, rubs or gallops  Abdomen: soft, nondistended, nontender, without mass or organomegaly  Skin: Left groin erythema & warmth resolved. Purulent drainage from pubic area improved. Improved erythema, induration & TTP as well   Extremities: no clubbing, cyanosis, or edema  Neurologic: alert, oriented, moves all extremities    LAB RESULTS:                        9.2    5.42  )-----------( 187      ( 13 Oct 2018 08:23 )             30.9     10-14    136  |  102  |  9   ----------------------------<  92  4.0   |  23  |  0.88    Ca    8.9      14 Oct 2018 07:23          MICROBIOLOGY:  RECENT CULTURES:  10-12 @ 11:31 .Abscess left labia Methicillin resistant Staphylococcus aureus    Moderate Methicillin resistant Staphylococcus aureus      RADIOLOGY REVIEWED:

## 2018-10-14 NOTE — PROGRESS NOTE ADULT - SUBJECTIVE AND OBJECTIVE BOX
doing well/ afebrile  REVIEW OF SYSTEMS:  GEN: no fever,    no chills  RESP: no SOB,   no cough  CVS: no chest pain,   no palpitations  GI: no abdominal pain,   no nausea,   no vomiting,   no constipation,   no diarrhea  : no dysuria,   no frequency  NEURO: no headache,   no dizziness  PSYCH: no depression,   not anxious  Derm : no rash    MEDICATIONS  (STANDING):  aspirin enteric coated 81 milliGRAM(s) Oral daily  atorvastatin 10 milliGRAM(s) Oral at bedtime  famotidine    Tablet 20 milliGRAM(s) Oral daily  heparin  Injectable 5000 Unit(s) SubCutaneous every 12 hours  influenza   Vaccine 0.5 milliLiter(s) IntraMuscular once  lactobacillus acidophilus 1 Tablet(s) Oral two times a day with meals  mesalamine ER (24-Hour) Capsule 1.5 Gram(s) Oral daily  metoprolol succinate ER 25 milliGRAM(s) Oral daily  piperacillin/tazobactam IVPB. 3.375 Gram(s) IV Intermittent every 8 hours  potassium chloride    Tablet ER 20 milliEquivalent(s) Oral daily  vancomycin  IVPB 1000 milliGRAM(s) IV Intermittent every 12 hours    MEDICATIONS  (PRN):  acetaminophen   Tablet .. 650 milliGRAM(s) Oral every 6 hours PRN Temp greater or equal to 38C (100.4F), Mild Pain (1 - 3)  nystatin Cream 1 Application(s) Topical two times a day PRN rash/irritation  traMADol 50 milliGRAM(s) Oral two times a day PRN Moderate Pain (4 - 6)      Vital Signs Last 24 Hrs  T(C): 36.7 (14 Oct 2018 05:00), Max: 37.1 (14 Oct 2018 00:10)  T(F): 98 (14 Oct 2018 05:00), Max: 98.7 (14 Oct 2018 00:10)  HR: 65 (14 Oct 2018 05:00) (65 - 79)  BP: 137/77 (14 Oct 2018 05:00) (129/78 - 138/82)  BP(mean): --  RR: 18 (14 Oct 2018 05:00) (18 - 18)  SpO2: 98% (14 Oct 2018 05:00) (95% - 98%)  CAPILLARY BLOOD GLUCOSE        I&O's Summary    13 Oct 2018 07:01  -  14 Oct 2018 07:00  --------------------------------------------------------  IN: 1520 mL / OUT: 0 mL / NET: 1520 mL        PHYSICAL EXAM:  HEAD:  Atraumatic, Normocephalic  NECK: Supple, No   JVD  CHEST/LUNG:   no     rales,     no,    rhonchi  HEART: Regular rate and rhythm;         murmur  ABDOMEN: Soft, Nontender, ;   EXTREMITIES:     no   edema  supra  pubic area, with no drainge/ healing  NEUROLOGY:  alert    LABS:                        9.2    5.42  )-----------( 187      ( 13 Oct 2018 08:23 )             30.9     10-13    137  |  101  |  9   ----------------------------<  95  3.4<L>   |  23  |  0.85    Ca    8.1<L>      13 Oct 2018 06:44                              Consultant(s) Notes Reviewed:      Care Discussed with Consultants/Other Providers: doing well/ afebrile  REVIEW OF SYSTEMS:  GEN: no fever,    no chills  RESP: no SOB,   no cough  CVS: no chest pain,   no palpitations  GI: no abdominal pain,   no nausea,   no vomiting,   no constipation,   no diarrhea  : no dysuria,   no frequency  NEURO: no headache,   no dizziness  PSYCH: no depression,   not anxious  Derm : no rash    MEDICATIONS  (STANDING):  aspirin enteric coated 81 milliGRAM(s) Oral daily  atorvastatin 10 milliGRAM(s) Oral at bedtime  famotidine    Tablet 20 milliGRAM(s) Oral daily  heparin  Injectable 5000 Unit(s) SubCutaneous every 12 hours  influenza   Vaccine 0.5 milliLiter(s) IntraMuscular once  lactobacillus acidophilus 1 Tablet(s) Oral two times a day with meals  mesalamine ER (24-Hour) Capsule 1.5 Gram(s) Oral daily  metoprolol succinate ER 25 milliGRAM(s) Oral daily  piperacillin/tazobactam IVPB. 3.375 Gram(s) IV Intermittent every 8 hours  potassium chloride    Tablet ER 20 milliEquivalent(s) Oral daily  vancomycin  IVPB 1000 milliGRAM(s) IV Intermittent every 12 hours    MEDICATIONS  (PRN):  acetaminophen   Tablet .. 650 milliGRAM(s) Oral every 6 hours PRN Temp greater or equal to 38C (100.4F), Mild Pain (1 - 3)  nystatin Cream 1 Application(s) Topical two times a day PRN rash/irritation  traMADol 50 milliGRAM(s) Oral two times a day PRN Moderate Pain (4 - 6)      Vital Signs Last 24 Hrs  T(C): 36.7 (14 Oct 2018 05:00), Max: 37.1 (14 Oct 2018 00:10)  T(F): 98 (14 Oct 2018 05:00), Max: 98.7 (14 Oct 2018 00:10)  HR: 65 (14 Oct 2018 05:00) (65 - 79)  BP: 137/77 (14 Oct 2018 05:00) (129/78 - 138/82)  BP(mean): --  RR: 18 (14 Oct 2018 05:00) (18 - 18)  SpO2: 98% (14 Oct 2018 05:00) (95% - 98%)  CAPILLARY BLOOD GLUCOSE        I&O's Summary    13 Oct 2018 07:01  -  14 Oct 2018 07:00  --------------------------------------------------------  IN: 1520 mL / OUT: 0 mL / NET: 1520 mL        PHYSICAL EXAM:  HEAD:  Atraumatic, Normocephalic  NECK: Supple, No   JVD  CHEST/LUNG:   no     rales,     no,    rhonchi  HEART: Regular rate and rhythm;         murmur  ABDOMEN: Soft, Nontender, ;   EXTREMITIES:     no   edema  supra  pubic area, with no drainage  healing  area  of  induration/ non tender, latera; a nd  superior to  drainage   area  NEUROLOGY:  alert    LABS:                        9.2    5.42  )-----------( 187      ( 13 Oct 2018 08:23 )             30.9     10-13    137  |  101  |  9   ----------------------------<  95  3.4<L>   |  23  |  0.85    Ca    8.1<L>      13 Oct 2018 06:44                              Consultant(s) Notes Reviewed:      Care Discussed with Consultants/Other Providers:

## 2018-10-15 ENCOUNTER — TRANSCRIPTION ENCOUNTER (OUTPATIENT)
Age: 73
End: 2018-10-15

## 2018-10-15 LAB
HCT VFR BLD CALC: 32.8 % — LOW (ref 34.5–45)
HGB BLD-MCNC: 10.4 G/DL — LOW (ref 11.5–15.5)
MCHC RBC-ENTMCNC: 25.3 PG — LOW (ref 27–34)
MCHC RBC-ENTMCNC: 31.7 GM/DL — LOW (ref 32–36)
MCV RBC AUTO: 79.8 FL — LOW (ref 80–100)
PLATELET # BLD AUTO: 193 K/UL — SIGNIFICANT CHANGE UP (ref 150–400)
RBC # BLD: 4.11 M/UL — SIGNIFICANT CHANGE UP (ref 3.8–5.2)
RBC # FLD: 16.5 % — HIGH (ref 10.3–14.5)
WBC # BLD: 5.71 K/UL — SIGNIFICANT CHANGE UP (ref 3.8–10.5)
WBC # FLD AUTO: 5.71 K/UL — SIGNIFICANT CHANGE UP (ref 3.8–10.5)

## 2018-10-15 PROCEDURE — 99232 SBSQ HOSP IP/OBS MODERATE 35: CPT

## 2018-10-15 PROCEDURE — 93970 EXTREMITY STUDY: CPT | Mod: 26

## 2018-10-15 RX ORDER — MORPHINE SULFATE 50 MG/1
2 CAPSULE, EXTENDED RELEASE ORAL ONCE
Qty: 0 | Refills: 0 | Status: DISCONTINUED | OUTPATIENT
Start: 2018-10-15 | End: 2018-10-15

## 2018-10-15 RX ADMIN — MORPHINE SULFATE 2 MILLIGRAM(S): 50 CAPSULE, EXTENDED RELEASE ORAL at 10:40

## 2018-10-15 RX ADMIN — HEPARIN SODIUM 5000 UNIT(S): 5000 INJECTION INTRAVENOUS; SUBCUTANEOUS at 18:32

## 2018-10-15 RX ADMIN — HEPARIN SODIUM 5000 UNIT(S): 5000 INJECTION INTRAVENOUS; SUBCUTANEOUS at 06:48

## 2018-10-15 RX ADMIN — Medication 25 MILLIGRAM(S): at 06:48

## 2018-10-15 RX ADMIN — Medication 1 TABLET(S): at 18:32

## 2018-10-15 RX ADMIN — Medication 650 MILLIGRAM(S): at 15:35

## 2018-10-15 RX ADMIN — MORPHINE SULFATE 2 MILLIGRAM(S): 50 CAPSULE, EXTENDED RELEASE ORAL at 10:25

## 2018-10-15 RX ADMIN — Medication 1.5 GRAM(S): at 12:30

## 2018-10-15 RX ADMIN — Medication 1 TABLET(S): at 09:00

## 2018-10-15 RX ADMIN — Medication 650 MILLIGRAM(S): at 16:20

## 2018-10-15 RX ADMIN — Medication 20 MILLIEQUIVALENT(S): at 12:30

## 2018-10-15 RX ADMIN — Medication 81 MILLIGRAM(S): at 12:30

## 2018-10-15 RX ADMIN — ATORVASTATIN CALCIUM 10 MILLIGRAM(S): 80 TABLET, FILM COATED ORAL at 21:56

## 2018-10-15 NOTE — PROGRESS NOTE ADULT - ASSESSMENT
73y female with HTN, HLD, recent diagnosis of ulcerative colitis on mesalamine and prednisone taper   Admitted with left groin/suprapubic cellulitis, found febrile 101.7 and leukocytosis of 16K  Prednisone stopped on 10/09/18  Fevers & leukocytosis resolved  Left groin cellulitis improved but she developed purulent drainage from a small opening at the left labia majora  S/p bedside I & D by Gyn   Wound cx MRSA and few Enterococcus  Gyn f/u noted, wound continues to improve    PLAN:   continue Bactrim 1 DS BID for pubic cellulitis another 5 days should suffice  MRSA primary pathogen here, Enterococcus colonizer only   Awaiting repeat LE Duplex as noted  D/c planning

## 2018-10-15 NOTE — DISCHARGE NOTE ADULT - PATIENT PORTAL LINK FT
You can access the [facility] Patient Portal, offered by [facility], by registering with the following website: [email or web address]

## 2018-10-15 NOTE — PROGRESS NOTE ADULT - SUBJECTIVE AND OBJECTIVE BOX
CC: f/u for left groin cellulitis & pubic abscess     Patient reports feeling well; Gyn wound assessment noted, improving; no further diarrhea    REVIEW OF SYSTEMS:  All other review of systems negative (Comprehensive ROS)    Antimicrobials Day # 8  Medications Reviewed    Vital Signs Last 24 Hrs  T(F): 98.5 (15 Oct 2018 08:03), Max: 98.5 (15 Oct 2018 08:03)  HR: 68 (15 Oct 2018 08:03) (68 - 77)  BP: 161/82 (15 Oct 2018 08:03) (136/79 - 180/76)  BP(mean): --  RR: 18 (15 Oct 2018 08:03) (18 - 18)  SpO2: 97% (15 Oct 2018 08:03) (97% - 99%)    PHYSICAL EXAM:  General: alert, no acute distress  Eyes:  anicteric, no conjunctival injection, no discharge  Oropharynx: no lesions or injection 	  Neck: supple, without adenopathy  Lungs: clear to auscultation  Heart: regular rate and rhythm; no murmur, rubs or gallops  Abdomen: soft, nondistended, nontender, without mass or organomegaly  Skin: No Left groin erythema or warmth   Extremities: mild L leg edema  Neurologic: alert, oriented, moves all extremities    LAB RESULTS:                        10.4   5.71  )-----------( 193      ( 15 Oct 2018 09:31 )             32.8   10-14    136  |  102  |  9   ----------------------------<  92  4.0   |  23  |  0.88    Ca    8.9      14 Oct 2018 07:23      MICROBIOLOGY:  RECENT CULTURES:  Culture - Abscess with Gram Stain (10.12.18 @ 11:31)    -  Gentamicin: S <=1 Should not be used as monotherapy    -  Linezolid: S 2    -  Oxacillin: R >2    -  Penicillin: R >8    -  RIF- Rifampin: S <=1 Should not be used as monotherapy    -  Tetra/Doxy: S <=1    -  Trimethoprim/Sulfamethoxazole: S <=0.5/9.5    -  Vancomycin: S 2    -  Ampicillin/Sulbactam: R 16/8    -  Cefazolin: R 8    -  Clindamycin: R >4    -  Daptomycin: S 0.5    -  Erythromycin: R >4    Specimen Source: .Abscess left labia    Culture Results:   Moderate Methicillin resistant Staphylococcus aureus  Rare Enterococcus faecalis    Organism Identification: Methicillin resistant Staphylococcus aureus    Organism: Methicillin resistant Staphylococcus aureus    Method Type: BROOKE    RADIOLOGY REVIEWED

## 2018-10-15 NOTE — PROGRESS NOTE ADULT - SUBJECTIVE AND OBJECTIVE BOX
much improved  REVIEW OF SYSTEMS:  GEN: no fever,    no chills  RESP: no SOB,   no cough  CVS: no chest pain,   no palpitations  GI: no abdominal pain,   no nausea,   no vomiting,   no constipation,   no diarrhea  : no dysuria,   no frequency  NEURO: no headache,   no dizziness  PSYCH: no depression,   not anxious  Derm : no rash    MEDICATIONS  (STANDING):  aspirin enteric coated 81 milliGRAM(s) Oral daily  atorvastatin 10 milliGRAM(s) Oral at bedtime  heparin  Injectable 5000 Unit(s) SubCutaneous every 12 hours  influenza   Vaccine 0.5 milliLiter(s) IntraMuscular once  lactobacillus acidophilus 1 Tablet(s) Oral two times a day with meals  mesalamine ER (24-Hour) Capsule 1.5 Gram(s) Oral daily  metoprolol succinate ER 25 milliGRAM(s) Oral daily  potassium chloride    Tablet ER 20 milliEquivalent(s) Oral daily  trimethoprim  160 mG/sulfamethoxazole 800 mG 1 Tablet(s) Oral two times a day    MEDICATIONS  (PRN):  acetaminophen   Tablet .. 650 milliGRAM(s) Oral every 6 hours PRN Temp greater or equal to 38C (100.4F), Mild Pain (1 - 3)  nystatin Cream 1 Application(s) Topical two times a day PRN rash/irritation  traMADol 50 milliGRAM(s) Oral two times a day PRN Moderate Pain (4 - 6)      Vital Signs Last 24 Hrs  T(C): 36.8 (15 Oct 2018 03:15), Max: 36.8 (14 Oct 2018 07:47)  T(F): 98.2 (15 Oct 2018 03:15), Max: 98.2 (14 Oct 2018 07:47)  HR: 73 (15 Oct 2018 06:12) (66 - 77)  BP: 168/65 (15 Oct 2018 06:12) (136/79 - 180/76)  BP(mean): --  RR: 18 (15 Oct 2018 06:12) (18 - 18)  SpO2: 99% (15 Oct 2018 06:12) (98% - 99%)  CAPILLARY BLOOD GLUCOSE        I&O's Summary    13 Oct 2018 07:01  -  14 Oct 2018 07:00  --------------------------------------------------------  IN: 1520 mL / OUT: 0 mL / NET: 1520 mL        PHYSICAL EXAM:  HEAD:  Atraumatic, Normocephalic  NECK: Supple, No   JVD  CHEST/LUNG:   no     rales,     no,    rhonchi  HEART: Regular rate and rhythm;         murmur  ABDOMEN: Soft, Nontender, ;   EXTREMITIES:   trace left  distal leg     edema  no drainage from supra  pubic  area  NEUROLOGY:  alert    LABS:                        9.2    5.42  )-----------( 187      ( 13 Oct 2018 08:23 )             30.9     10-14    136  |  102  |  9   ----------------------------<  92  4.0   |  23  |  0.88    Ca    8.9      14 Oct 2018 07:23                              Consultant(s) Notes Reviewed:      Care Discussed with Consultants/Other Providers:

## 2018-10-15 NOTE — PROGRESS NOTE ADULT - ATTENDING COMMENTS
Late entry, pt seen and examined at 7am. Agree with above. Cellulitis improving. Dressing changes at home.    GO Desir

## 2018-10-15 NOTE — PROGRESS NOTE ADULT - ASSESSMENT
pt  admitted  with  h/o abd  pain/ / left  groin pain       , with elevated wbc/  hyponatremia/  dehydration  pt with cellulitis,  of supra  pubic area, resolving    doppler legs, no dvt    elevated wbc, from  steroid,  and from  cellulitis/ supra pubic area  wbc  decreasing from  17,000 to 5,000   surg  eval , noted, no intervention  seen by gyn/ packing  in wound/  no drainage  now/   pt refusing   further   imaging  ,as f/p  no drainage from vulvar area  on zosyn/ Vanco/   defer duration to ID   wound  c/s  with  MRSA/   on bactrim  anemia  with low iron and  saturation / guaiac negative now/  s/p very recent colonoscopy, and dx  with colitis  cbc  pending  today   doppler legs,  to r/o dvt pt  admitted  with  h/o abd  pain/ / left  groin pain       , with elevated wbc/  hyponatremia/  dehydration  pt with cellulitis,  of supra  pubic area, resolving    doppler legs, no dvt    elevated wbc, from  steroid,  and from  cellulitis/ supra pubic area  wbc  decreasing from  17,000 to 5,000   surg  eval , noted, no intervention  seen by gyn/ packing  in wound/  no drainage  now/   pt refusing   further   imaging  ,as f/p  no drainage from vulvar area  on zosyn/ Vanco/   defer duration to ID   wound  c/s  with  MRSA/   on bactrim  anemia  with low iron and  saturation / guaiac negative now/  s/p very recent colonoscopy, and dx  with colitis  cbc  pending  today   doppler legs,  to r/o dvt, mild distal left leg swelling/  could be related to groin  swelling

## 2018-10-15 NOTE — DISCHARGE NOTE ADULT - HOSPITAL COURSE
72 y/o F with PMHx of HTN, HLD, GERD and newly diagnosed UC on mesalamine and prednisone taper admitted with left groin/suprapubic cellulitis that started on 10/03/18. Found febrile in hospital to 101.7 with leukocytosis of 16K  Prednisone stopped on 10/09/18  Fevers & leukocytosis resolved  Left groin cellulitis improved but she developed purulent drainage from a small opening at the left labia majora  S/p bedside I & D by Gyn   Given IV Zosyn and Vanco and wound cx grew MRSA so Pt started on Bactrim           If diarrhea persists check stools for C diff  Anticipate an additional 1 wk course of antibiotics - PO Bactrim upon d/c home with continued follow up with surgery team for local wound care     doppler legs, no dvt    elevated wbc, from  steroid,  and from  cellulitis/ supra pubic area  wbc  decreasing from  17,000 to 5,000   surg  eval , noted, no intervention  seen by gyn/ packing  in wound/  no drainage  now/   pt refusing   further   imaging  ,as f/p  no drainage from vulvar area  on zosyn/ Vanco/   defer duration to ID   wound  c/s  with  MRSA/   on bactrim for  4  more days  d/c today 74 y/o F with PMHx of HTN, HLD, GERD and newly diagnosed UC on mesalamine and prednisone taper admitted with left groin/suprapubic cellulitis that started on 10/03/18. Found febrile in hospital to 101.7 with leukocytosis of 16K  Prednisone stopped on 10/09/18  Fevers & leukocytosis resolved  Left groin cellulitis improved but she developed purulent drainage from a small opening at the left labia majora -S/p bedside I & D by Gyn   Given IV Zosyn and Vanco and wound cx grew MRSA so Pt started on Bactrim  Anticipate an additional 1 wk course of antibiotics - PO Bactrim upon d/c home with continued follow up with surgery team for local wound care 72 y/o F with PMHx of HTN, HLD, GERD and newly diagnosed UC on mesalamine and prednisone taper admitted with left groin/suprapubic cellulitis that started on 10/03/18. Found febrile in hospital to 101.7 with leukocytosis of 16K  Prednisone stopped on 10/09/18  Fevers & leukocytosis resolved  Left groin cellulitis improved but she developed purulent drainage from a small opening at the left labia majora -S/p bedside I & D by Gyn   Given IV Zosyn and Vanco and wound cx grew MRSA so Pt started on Bactrim  Anticipate an additional 1 wk course of antibiotics - PO Bactrim upon d/c home with continued follow up with surgery team for local wound care   Pt was also seen by GI for anemia - without overt/brisk GI bleed, likely related to UC; guaiac negative this admission 72 y/o F with PMHx of HTN, HLD, GERD and newly diagnosed UC on mesalamine and prednisone taper admitted with left groin/suprapubic cellulitis that started on 10/03/18. Found febrile in hospital to 101.7 with leukocytosis of 16K  Prednisone stopped on 10/09/18  Fevers & leukocytosis resolved  Left groin cellulitis improved but she developed purulent drainage from a small opening at the left labia majora -S/p bedside I & D by Gyn   Given IV Zosyn and Vanco and wound cx grew MRSA so Pt started on Bactrim  Anticipate an additional 1 wk course of antibiotics - PO Bactrim upon d/c home with continued follow up with surgery team for local wound care   Pt was also seen by GI for anemia - without overt/brisk GI bleed, likely related to UC; guaiac negative this admission    Pt will be discharge home with home wound care, follow up with PCP, GI (Kristopher Trivedi, GYN clinic and 4 days of Bactrim PO 74 y/o F with PMHx of HTN, HLD, GERD and newly diagnosed UC on mesalamine and prednisone taper admitted with left groin/suprapubic cellulitis that started on 10/03/18. Found febrile in hospital to 101.7 with leukocytosis of 16K  Prednisone stopped on 10/09/18  Fevers & leukocytosis resolved  Left groin cellulitis improved but she developed purulent drainage , in  supra pubic  area of  fluctuamne, -S/p bedside I & D by Gyn   Given IV Zosyn and Vanco and wound cx grew MRSA so Pt started on Bactrim  Anticipate an additional 1 wk course of antibiotics - PO Bactrim upon d/c home with continued follow up with surgery team for local wound care   Pt was also seen by GI for anemia - without overt/brisk GI bleed, likely related to UC; guaiac negative this admission    Pt will be discharge home with home wound care, follow up with PCP, GI (Kristopher Trivedi, GYN clinic and 4 days of Bactrim PO

## 2018-10-15 NOTE — PROGRESS NOTE ADULT - ATTENDING COMMENTS
no diarrhea or rectal bleeding on mesalamine, for newly diagnosed UC.     plan agree with management as above.

## 2018-10-15 NOTE — DISCHARGE NOTE ADULT - CARE PROVIDER_API CALL
Maximilian Garcia), Cardiovascular Disease; Internal Medicine  [address] [id number]  Phone: [phone]  Fax: [phone]    Kristopher Westbrook), Gastroenterology  [address] [place] [address]  Phone: [phone]  Fax: [phone]    GYN clinic,   [address] [place] [address]  Phone: [phone]  Fax: (   )    -

## 2018-10-15 NOTE — DISCHARGE NOTE ADULT - PROVIDER TOKENS
TOKEN:'[id number]',TOKEN:'[id number]',FREE:[LAST:[GYN clinic],PHONE:[phone]],FAX:[(   )    -],ADDRESS:[[address] [place] [address]]]

## 2018-10-15 NOTE — DISCHARGE NOTE ADULT - PLAN OF CARE
Resolution admitted with left groin/suprapubic cellulitis that started on 10/03/18. Found febrile in hospital to 101.7 with leukocytosis of 16K  Prednisone stopped on 10/09/18  Fevers & leukocytosis resolved  Left groin cellulitis improved but she developed purulent drainage from a small opening at the left labia majora -S/p bedside I & D by Gyn   Given IV Zosyn and Vanco and wound cx grew MRSA so Pt started on Bactrim  Anticipate an additional 1 wk course of antibiotics - PO Bactrim upon d/c home with continued follow up with surgery team for local wound care Pt was also seen by GI for anemia - without overt/brisk GI bleed, likely related to UC; guaiac negative this admission  Pt will be discharge home with follow up with GI (Dr. Westbrook, Kristopher

## 2018-10-15 NOTE — PROGRESS NOTE ADULT - SUBJECTIVE AND OBJECTIVE BOX
- Patient seen and examined.  - In summary, patient is a 73 year old woman who presented with LEFT GROIN PAIN (08 Oct 2018 09:58)  - Today, patient is without complaints.         *****MEDICATIONS:    MEDICATIONS  (STANDING):  aspirin enteric coated 81 milliGRAM(s) Oral daily  atorvastatin 10 milliGRAM(s) Oral at bedtime  heparin  Injectable 5000 Unit(s) SubCutaneous every 12 hours  influenza   Vaccine 0.5 milliLiter(s) IntraMuscular once  lactobacillus acidophilus 1 Tablet(s) Oral two times a day with meals  mesalamine ER (24-Hour) Capsule 1.5 Gram(s) Oral daily  metoprolol succinate ER 25 milliGRAM(s) Oral daily  morphine  - Injectable 2 milliGRAM(s) IV Push once  potassium chloride    Tablet ER 20 milliEquivalent(s) Oral daily  trimethoprim  160 mG/sulfamethoxazole 800 mG 1 Tablet(s) Oral two times a day    MEDICATIONS  (PRN):  acetaminophen   Tablet .. 650 milliGRAM(s) Oral every 6 hours PRN Temp greater or equal to 38C (100.4F), Mild Pain (1 - 3)  nystatin Cream 1 Application(s) Topical two times a day PRN rash/irritation  traMADol 50 milliGRAM(s) Oral two times a day PRN Moderate Pain (4 - 6)             ***** REVIEW OF SYSTEM:  GEN: no fever, no chills, no pain  RESP: no SOB, no cough, no sputum  CVS: no chest pain, no palpitations, no edema  GI: no abdominal pain, no nausea, no vomiting, no constipation, no diarrhea  : no dysuria, no frequency  NEURO: no headache, no dizziness  PSYCH: no depression, not anxious  Derm : no itching, no rash         ***** VITAL SIGNS:    T(F): 98.5 (10-15-18 @ 08:03), Max: 98.5 (10-15-18 @ 08:03)  HR: 68 (10-15-18 @ 08:03) (68 - 77)  BP: 161/82 (10-15-18 @ 08:03) (136/79 - 180/76)  RR: 18 (10-15-18 @ 08:03) (18 - 18)  SpO2: 97% (10-15-18 @ 08:03) (97% - 99%)  Wt(kg): --  ,   I&O's Summary                 *****PHYSICAL EXAM:  GEN: A&O X 3 , NAD , comfortable  HEENT: NCAT, EOMI, MMM, no icterus  NECK: Supple, No JVD  CVS: S1S2 , regular , No M/R/G appreciated  PULM: CTA B/L,  no W/R/R appreciated  ABD.: soft. non tender, non distended,  bowel sounds present  Extrem: intact pulses , no edema noted  Derm: No rash or ecchymosis noted  PSYCH: normal mood, no depression, not anxious         *****LAB AND IMAGING:                                                10.4   5.71  )-----------( 193      ( 15 Oct 2018 09:31 )             32.8               10-14    136  |  102  |  9   ----------------------------<  92  4.0   |  23  |  0.88    Ca    8.9      14 Oct 2018 07:23    [All pertinent recent Imaging/Reports reviewed]  < from: Transthoracic Echocardiogram (10.09.18 @ 15:04) >  Conclusions:  1. Aortic valve not well visualized; appears calcified.  2. Endocardium not well visualized; grossly preserved  left  ventricular systolic function.  3. The right ventricle is not well visualized; grossly  normal right ventricular systolic function.    < end of copied text >         *****A S S E S S M E N T   A N D   P L A N :  73F with fever, pubic area cellulitis  no  dvt on LE doppler  echo noted  continue current meds  abx per ID  f/u gyn  wound care  DCP    __________________________  GO Gerber D.O.

## 2018-10-15 NOTE — DISCHARGE NOTE ADULT - CARE PLAN
Principal Discharge DX:	Deep vein thrombosis (DVT) of other vein of left lower extremity  Secondary Diagnosis:	Groin pain, left  Secondary Diagnosis:	Cellulitis Principal Discharge DX:	Cellulitis  Goal:	Resolution  Assessment and plan of treatment:	admitted with left groin/suprapubic cellulitis that started on 10/03/18. Found febrile in hospital to 101.7 with leukocytosis of 16K  Prednisone stopped on 10/09/18  Fevers & leukocytosis resolved  Left groin cellulitis improved but she developed purulent drainage from a small opening at the left labia majora -S/p bedside I & D by Gyn   Given IV Zosyn and Vanco and wound cx grew MRSA so Pt started on Bactrim  Anticipate an additional 1 wk course of antibiotics - PO Bactrim upon d/c home with continued follow up with surgery team for local wound care  Secondary Diagnosis:	Anemia  Assessment and plan of treatment:	Pt was also seen by GI for anemia - without overt/brisk GI bleed, likely related to UC; guaiac negative this admission  Pt will be discharge home with follow up with GI (Dr. Westbrook, Kristopher

## 2018-10-15 NOTE — PROGRESS NOTE ADULT - ASSESSMENT
74 yo female with PMHof  HTN, GERD,,  Breast CA 5  yeara ago, newly diagnosed with Ulcerative colitis (had  colonoscopy 3-4  weeks ago)   admitted with left groin cellulitis , clinically not c/w pyoderma gangrenosum. ID and GYN following    Anemia - without overt/brisk GI bleed, likely related to UC    PLAN  Continue Mesalamine  monitor off prednisone given acute infectious process  Pepcid for GI prophylaxis  Abx as per ID  wound care    Parmjit Winn PA-C    [facility]  [phone]

## 2018-10-15 NOTE — PROGRESS NOTE ADULT - SUBJECTIVE AND OBJECTIVE BOX
Patient seen at bedside for dressing change.  Pt reports significantly decreased tenderness, as she is able to get in and out of bed without difficulty. She describes watery discharge now, no purulent drainage.    Left groin - no erythema or warmth. +Induration along lower edge of left labia majora/inguinal area. No fluctuance, minimal tenderness to palpation.    Packing removed. Attempted to express opening, no drainage noted. Wound flushed using normal saline.  Wound repacked with iodoform packing, ~3.5cm deep.  Gauze placed over wound    72yo  postmenopausal s/p MARTIN/BS @ 48yo with with h/o HTN, HLD, and newly diagnosed ulcerative colitis (in past month) on mesalamine and prednisone taper admitted with left groin cellulitis. Pt now with spontaneously draining left mons/labia abscess dressed with iodoform packing    - cellulitis significantly improved, patient symptomatically better  - GYN will continue daily dressing changes. Please arrange for wound care upon discharge    D/w Dr. Thang Burgess, R3 Patient seen at bedside for dressing change.  Pt reports significantly decreased tenderness, as she is able to get in and out of bed without difficulty. She describes watery discharge now, no purulent drainage.    Left groin - no erythema or warmth. +Induration along lower edge of left labia majora/inguinal area. No fluctuance, minimal tenderness to palpation.    Packing removed. Attempted to express opening, no drainage noted. Wound flushed using normal saline.  Wound repacked with iodoform packing, ~3.5cm deep.  Gauze placed over wound    74yo  postmenopausal s/p MARTIN/BS @ 48yo with with h/o HTN, HLD, and newly diagnosed ulcerative colitis (in past month) on mesalamine and prednisone taper admitted with left groin cellulitis. Pt now with spontaneously draining left mons/labia abscess dressed with iodoform packing    - cellulitis significantly improved, patient symptomatically better  - GYN will continue daily dressing changes. Please arrange for wound care upon discharge    D/w Dr. Thang Burgess, R3

## 2018-10-15 NOTE — PROGRESS NOTE ADULT - SUBJECTIVE AND OBJECTIVE BOX
Patient is a 73y old  Female who presented with a chief complaint of LEFT GROIN PAIN (15 Oct 2018 14:32)      INTERVAL HPI/OVERNIGHT EVENTS:  still with groin discomfort and drainage (less purulent)  no rectal bleed  appetite is good  brown stools  no diarrhea  no fever or chills    MEDICATIONS  (STANDING):  aspirin enteric coated 81 milliGRAM(s) Oral daily  atorvastatin 10 milliGRAM(s) Oral at bedtime  heparin  Injectable 5000 Unit(s) SubCutaneous every 12 hours  influenza   Vaccine 0.5 milliLiter(s) IntraMuscular once  lactobacillus acidophilus 1 Tablet(s) Oral two times a day with meals  mesalamine ER (24-Hour) Capsule 1.5 Gram(s) Oral daily  metoprolol succinate ER 25 milliGRAM(s) Oral daily  potassium chloride    Tablet ER 20 milliEquivalent(s) Oral daily  trimethoprim  160 mG/sulfamethoxazole 800 mG 1 Tablet(s) Oral two times a day    MEDICATIONS  (PRN):  acetaminophen   Tablet .. 650 milliGRAM(s) Oral every 6 hours PRN Temp greater or equal to 38C (100.4F), Mild Pain (1 - 3)  nystatin Cream 1 Application(s) Topical two times a day PRN rash/irritation  traMADol 50 milliGRAM(s) Oral two times a day PRN Moderate Pain (4 - 6)      Allergies  No Known Allergies      Review of Systems:  General:  No wt loss, chills, night sweats, fatigue  CV:  No pain, palpitations, hypo/hypertension  Resp:  No dyspnea, cough, tachypnea, wheezing  :  see HPI  Muscle:  No pain, weakness  Neuro:  No weakness, tingling, memory problems  Psych:  No fatigue, insomnia, mood problems, depression  Endocrine:  No polyuria, polydypsia, cold/heat intolerance  Heme:  No petechiae, ecchymosis, easy bruisability      Vital Signs Last 24 Hrs  T(C): 36.9 (15 Oct 2018 08:03), Max: 36.9 (15 Oct 2018 08:03)  T(F): 98.5 (15 Oct 2018 08:03), Max: 98.5 (15 Oct 2018 08:03)  HR: 68 (15 Oct 2018 08:03) (68 - 77)  BP: 161/82 (15 Oct 2018 08:03) (136/79 - 180/76)  BP(mean): --  RR: 18 (15 Oct 2018 08:03) (18 - 18)  SpO2: 97% (15 Oct 2018 08:03) (97% - 99%)    PHYSICAL EXAM:  Constitutional: NAD, well-developed pleasant female  Neck: No LAD, supple  Respiratory: CTA and P  Cardiovascular: S1 and S2, RRR, no M  Gastrointestinal: BS+, soft, obese soft LLQ area of demarcation - without erythema +erythema and induration of Left groin/pubis +tender to palpation +wound with iodoform gauze +pus  no induration no gangrenous appearance  Extremities: +edema  Vascular: 2+ peripheral pulses  Neurological: A/O x 3, no focal deficits  Psychiatric: Normal mood, normal affect  Skin: see above    LABS:                        10.4   5.71  )-----------( 193      ( 15 Oct 2018 09:31 )             32.8   Occult Blood, Feces (10.13.18 @ 19:04)    Occult Blood, Feces: Negative      10-14    136  |  102  |  9   ----------------------------<  92  4.0   |  23  |  0.88    Ca    8.9      14 Oct 2018 07:23      RADIOLOGY & ADDITIONAL TESTS:

## 2018-10-16 VITALS
DIASTOLIC BLOOD PRESSURE: 84 MMHG | OXYGEN SATURATION: 99 % | RESPIRATION RATE: 18 BRPM | HEART RATE: 73 BPM | TEMPERATURE: 98 F | SYSTOLIC BLOOD PRESSURE: 144 MMHG

## 2018-10-16 LAB
-  AMPICILLIN: SIGNIFICANT CHANGE UP
-  TETRACYCLINE: SIGNIFICANT CHANGE UP
-  VANCOMYCIN: SIGNIFICANT CHANGE UP
METHOD TYPE: SIGNIFICANT CHANGE UP

## 2018-10-16 RX ORDER — LACTOBACILLUS ACIDOPHILUS 100MM CELL
1 CAPSULE ORAL
Qty: 0 | Refills: 0 | DISCHARGE
Start: 2018-10-16

## 2018-10-16 RX ORDER — PANTOPRAZOLE SODIUM 20 MG/1
1 TABLET, DELAYED RELEASE ORAL
Qty: 0 | Refills: 0 | COMMUNITY

## 2018-10-16 RX ORDER — PANTOPRAZOLE SODIUM 20 MG/1
1 TABLET, DELAYED RELEASE ORAL
Qty: 30 | Refills: 0
Start: 2018-10-16 | End: 2018-11-14

## 2018-10-16 RX ORDER — HYDROCHLOROTHIAZIDE 25 MG
25 TABLET ORAL DAILY
Qty: 0 | Refills: 0 | Status: DISCONTINUED | OUTPATIENT
Start: 2018-10-16 | End: 2018-10-16

## 2018-10-16 RX ORDER — MORPHINE SULFATE 50 MG/1
2 CAPSULE, EXTENDED RELEASE ORAL ONCE
Qty: 0 | Refills: 0 | Status: DISCONTINUED | OUTPATIENT
Start: 2018-10-16 | End: 2018-10-16

## 2018-10-16 RX ORDER — PANTOPRAZOLE SODIUM 20 MG/1
1 TABLET, DELAYED RELEASE ORAL
Qty: 30 | Refills: 0 | OUTPATIENT
Start: 2018-10-16 | End: 2018-11-14

## 2018-10-16 RX ADMIN — Medication 20 MILLIEQUIVALENT(S): at 11:09

## 2018-10-16 RX ADMIN — Medication 25 MILLIGRAM(S): at 05:48

## 2018-10-16 RX ADMIN — Medication 1 TABLET(S): at 07:43

## 2018-10-16 RX ADMIN — Medication 650 MILLIGRAM(S): at 11:08

## 2018-10-16 RX ADMIN — Medication 1 TABLET(S): at 05:48

## 2018-10-16 RX ADMIN — Medication 650 MILLIGRAM(S): at 01:18

## 2018-10-16 RX ADMIN — MORPHINE SULFATE 2 MILLIGRAM(S): 50 CAPSULE, EXTENDED RELEASE ORAL at 07:43

## 2018-10-16 RX ADMIN — Medication 1.5 GRAM(S): at 11:09

## 2018-10-16 RX ADMIN — Medication 81 MILLIGRAM(S): at 11:09

## 2018-10-16 RX ADMIN — Medication 650 MILLIGRAM(S): at 02:15

## 2018-10-16 RX ADMIN — HEPARIN SODIUM 5000 UNIT(S): 5000 INJECTION INTRAVENOUS; SUBCUTANEOUS at 05:48

## 2018-10-16 NOTE — PROGRESS NOTE ADULT - SUBJECTIVE AND OBJECTIVE BOX
Patient is a 73y old  Female who presented with a chief complaint of LEFT GROIN PAIN (16 Oct 2018 09:56)      INTERVAL HPI/OVERNIGHT EVENTS:  left groin pain better  decreased swelling  eager to go home today  no diarrhea  no rectal bleeding    MEDICATIONS  (STANDING):  aspirin enteric coated 81 milliGRAM(s) Oral daily  atorvastatin 10 milliGRAM(s) Oral at bedtime  heparin  Injectable 5000 Unit(s) SubCutaneous every 12 hours  hydrochlorothiazide 25 milliGRAM(s) Oral daily  influenza   Vaccine 0.5 milliLiter(s) IntraMuscular once  lactobacillus acidophilus 1 Tablet(s) Oral two times a day with meals  mesalamine ER (24-Hour) Capsule 1.5 Gram(s) Oral daily  metoprolol succinate ER 25 milliGRAM(s) Oral daily  potassium chloride    Tablet ER 20 milliEquivalent(s) Oral daily  trimethoprim  160 mG/sulfamethoxazole 800 mG 1 Tablet(s) Oral two times a day    MEDICATIONS  (PRN):  acetaminophen   Tablet .. 650 milliGRAM(s) Oral every 6 hours PRN Temp greater or equal to 38C (100.4F), Mild Pain (1 - 3)  nystatin Cream 1 Application(s) Topical two times a day PRN rash/irritation      Allergies  No Known Allergies    Review of Systems:  General:  No wt loss, chills, night sweats, fatigue  CV:  No pain, palpitations, hypo/hypertension  Resp:  No dyspnea, cough, tachypnea, wheezing  :  see HPI  Muscle:  No pain, weakness  Neuro:  No weakness, tingling, memory problems  Psych:  No fatigue, insomnia, mood problems, depression  Endocrine:  No polyuria, polydypsia, cold/heat intolerance  Heme:  No petechiae, ecchymosis, easy bruisability    Vital Signs Last 24 Hrs  T(C): 36.7 (16 Oct 2018 09:27), Max: 36.7 (16 Oct 2018 09:27)  T(F): 98 (16 Oct 2018 09:27), Max: 98 (16 Oct 2018 09:27)  HR: 73 (16 Oct 2018 09:27) (73 - 80)  BP: 144/84 (16 Oct 2018 09:27) (144/84 - 150/80)  BP(mean): --  RR: 18 (16 Oct 2018 09:27) (18 - 20)  SpO2: 99% (16 Oct 2018 09:27) (99% - 99%)    PHYSICAL EXAM:  Constitutional: NAD, well-developed pleasant female  Neck: No LAD, supple  Respiratory: CTA and P  Cardiovascular: S1 and S2, RRR, no M  Gastrointestinal: BS+, soft, obese soft LLQ area of demarcation - without erythema +decreased erythema and induration of Left groin/pubis +tender to palpation +wound with iodoform gauze +pus  no induration no gangrenous appearance  Extremities: +edema  Vascular: 2+ peripheral pulses  Neurological: A/O x 3, no focal deficits  Psychiatric: Normal mood, normal affect  Skin: see above    LABS:                        10.4   5.71  )-----------( 193      ( 15 Oct 2018 09:31 )             32.8                   RADIOLOGY & ADDITIONAL TESTS:

## 2018-10-16 NOTE — PROGRESS NOTE ADULT - REASON FOR ADMISSION
LEFT GROIN PAIN

## 2018-10-16 NOTE — PROGRESS NOTE ADULT - ASSESSMENT
74yo  postmenopausal s/p MARTIN/BS @ 48yo with with h/o HTN, HLD, and newly diagnosed ulcerative colitis (in past month) on mesalamine and prednisone taper admitted with left groin cellulitis. Pt now with spontaneously draining left mons/labia abscess dressed with iodoform packing.  Drainage culture c/w MRSA, sensitive to Bactrim. Pt on contact isolation    - continue Bactrim per ID  - change wound packing daily at home (home care)    D/w Dr. Thang Burgess, R3

## 2018-10-16 NOTE — PROGRESS NOTE ADULT - SUBJECTIVE AND OBJECTIVE BOX
doing better  REVIEW OF SYSTEMS:  GEN: no fever,    no chills  RESP: no SOB,   no cough  CVS: no chest pain,   no palpitations  GI: no abdominal pain,   no nausea,   no vomiting,   no constipation,   no diarrhea  : no dysuria,   no frequency  NEURO: no headache,   no dizziness  PSYCH: no depression,   not anxious  Derm : no rash    MEDICATIONS  (STANDING):  aspirin enteric coated 81 milliGRAM(s) Oral daily  atorvastatin 10 milliGRAM(s) Oral at bedtime  heparin  Injectable 5000 Unit(s) SubCutaneous every 12 hours  influenza   Vaccine 0.5 milliLiter(s) IntraMuscular once  lactobacillus acidophilus 1 Tablet(s) Oral two times a day with meals  mesalamine ER (24-Hour) Capsule 1.5 Gram(s) Oral daily  metoprolol succinate ER 25 milliGRAM(s) Oral daily  potassium chloride    Tablet ER 20 milliEquivalent(s) Oral daily  trimethoprim  160 mG/sulfamethoxazole 800 mG 1 Tablet(s) Oral two times a day    MEDICATIONS  (PRN):  acetaminophen   Tablet .. 650 milliGRAM(s) Oral every 6 hours PRN Temp greater or equal to 38C (100.4F), Mild Pain (1 - 3)  nystatin Cream 1 Application(s) Topical two times a day PRN rash/irritation      Vital Signs Last 24 Hrs  T(C): 36.3 (15 Oct 2018 20:28), Max: 36.9 (15 Oct 2018 08:03)  T(F): 97.4 (15 Oct 2018 20:28), Max: 98.5 (15 Oct 2018 08:03)  HR: 80 (16 Oct 2018 05:30) (68 - 80)  BP: 150/80 (16 Oct 2018 05:30) (147/87 - 161/82)  BP(mean): --  RR: 20 (15 Oct 2018 20:28) (18 - 20)  SpO2: 99% (15 Oct 2018 20:28) (97% - 99%)  CAPILLARY BLOOD GLUCOSE        I&O's Summary    15 Oct 2018 07:01  -  16 Oct 2018 07:00  --------------------------------------------------------  IN: 480 mL / OUT: 0 mL / NET: 480 mL        PHYSICAL EXAM:  HEAD:  Atraumatic, Normocephalic  NECK: Supple, No   JVD  CHEST/LUNG:   no     rales,     no,    rhonchi  HEART: Regular rate and rhythm;         murmur  ABDOMEN: Soft, Nontender, ;   EXTREMITIES:      trace  edema  no drainage noted  NEUROLOGY:  alert    LABS:                        10.4   5.71  )-----------( 193      ( 15 Oct 2018 09:31 )             32.8     10-14    136  |  102  |  9   ----------------------------<  92  4.0   |  23  |  0.88    Ca    8.9      14 Oct 2018 07:23                              Consultant(s) Notes Reviewed:      Care Discussed with Consultants/Other Providers:

## 2018-10-16 NOTE — PROGRESS NOTE ADULT - SUBJECTIVE AND OBJECTIVE BOX
- Patient seen and examined.  - In summary, patient is a 73 year old woman who presented with LEFT GROIN PAIN (08 Oct 2018 09:58)  - Today, patient is without complaints.         *****MEDICATIONS:    MEDICATIONS  (STANDING):  aspirin enteric coated 81 milliGRAM(s) Oral daily  atorvastatin 10 milliGRAM(s) Oral at bedtime  heparin  Injectable 5000 Unit(s) SubCutaneous every 12 hours  hydrochlorothiazide 25 milliGRAM(s) Oral daily  influenza   Vaccine 0.5 milliLiter(s) IntraMuscular once  lactobacillus acidophilus 1 Tablet(s) Oral two times a day with meals  mesalamine ER (24-Hour) Capsule 1.5 Gram(s) Oral daily  metoprolol succinate ER 25 milliGRAM(s) Oral daily  potassium chloride    Tablet ER 20 milliEquivalent(s) Oral daily  trimethoprim  160 mG/sulfamethoxazole 800 mG 1 Tablet(s) Oral two times a day    MEDICATIONS  (PRN):  acetaminophen   Tablet .. 650 milliGRAM(s) Oral every 6 hours PRN Temp greater or equal to 38C (100.4F), Mild Pain (1 - 3)  nystatin Cream 1 Application(s) Topical two times a day PRN rash/irritation               ***** REVIEW OF SYSTEM:  GEN: no fever, no chills, no pain  RESP: no SOB, no cough, no sputum  CVS: no chest pain, no palpitations, no edema  GI: no abdominal pain, no nausea, no vomiting, no constipation, no diarrhea  : no dysuria, no frequency  NEURO: no headache, no dizziness  PSYCH: no depression, not anxious  Derm : no itching, no rash         ***** VITAL SIGNS:    T(F): 98 (10-16-18 @ 09:27), Max: 98 (10-16-18 @ 09:27)  HR: 73 (10-16-18 @ 09:27) (73 - 80)  BP: 144/84 (10-16-18 @ 09:27) (144/84 - 150/80)  RR: 18 (10-16-18 @ 09:27) (18 - 20)  SpO2: 99% (10-16-18 @ 09:27) (99% - 99%)  Wt(kg): --  ,   I&O's Summary    15 Oct 2018 07:01  -  16 Oct 2018 07:00  --------------------------------------------------------  IN: 480 mL / OUT: 0 mL / NET: 480 mL                 *****PHYSICAL EXAM:  GEN: A&O X 3 , NAD , comfortable  HEENT: NCAT, EOMI, MMM, no icterus  NECK: Supple, No JVD  CVS: S1S2 , regular , No M/R/G appreciated  PULM: CTA B/L,  no W/R/R appreciated  ABD.: soft. non tender, non distended,  bowel sounds present  Extrem: intact pulses , no edema noted  Derm: No rash or ecchymosis noted  PSYCH: normal mood, no depression, not anxious         *****LAB AND IMAGING:                                              10.4   5.71  )-----------( 193      ( 15 Oct 2018 09:31 )             32.8                   [All pertinent recent Imaging/Reports reviewed]  < from: Transthoracic Echocardiogram (10.09.18 @ 15:04) >  Conclusions:  1. Aortic valve not well visualized; appears calcified.  2. Endocardium not well visualized; grossly preserved  left  ventricular systolic function.  3. The right ventricle is not well visualized; grossly  normal right ventricular systolic function.    < end of copied text >         *****A S S E S S M E N T   A N D   P L A N :  73F with fever, pubic area cellulitis  no  dvt on LE doppler  echo noted  continue current meds  abx per ID  f/u gyn  wound care  DCP    __________________________  GO Gerber D.O.

## 2018-10-16 NOTE — PROGRESS NOTE ADULT - SUBJECTIVE AND OBJECTIVE BOX
NOTE IN PROGRESS  Patient seen at bedside for daily wound packing change.  Patient reports    Vital Signs Last 24 Hrs  T(C): 36.3 (15 Oct 2018 20:28), Max: 36.9 (15 Oct 2018 08:03)  T(F): 97.4 (15 Oct 2018 20:28), Max: 98.5 (15 Oct 2018 08:03)  HR: 80 (16 Oct 2018 05:30) (68 - 80)  BP: 150/80 (16 Oct 2018 05:30) (147/87 - 161/82)  RR: 20 (15 Oct 2018 20:28) (18 - 20)  SpO2: 99% (15 Oct 2018 20:28) (97% - 99%)    Left groin - no erythema or warmth. +Induration along lower edge of left labia majora/inguinal area. No fluctuance, minimal tenderness to palpation.    Packing removed. Attempted to express opening, no drainage noted. Wound flushed using normal saline.  Wound repacked with iodoform packing, ***  Gauze placed over wound    72yo  postmenopausal s/p MARTIN/BS @ 48yo with with h/o HTN, HLD, and newly diagnosed ulcerative colitis (in past month) on mesalamine and prednisone taper admitted with left groin cellulitis. Pt now with spontaneously draining left mons/labia abscess dressed with iodoform packing.  Drainage culture c/w MRSA, sensitive to Bactrim. Pt on contact isolation    - continue Bactrim per ID  - change wound packing daily at home (home care)    D/w Dr. Thang Burgess, R3 NOTE IN PROGRESS  Patient seen at bedside for daily wound packing change.  Patient reports continued ambulation and overall feeling better. She has not noticed any purulent discharge from wound.     Vital Signs Last 24 Hrs  T(C): 36.3 (15 Oct 2018 20:28), Max: 36.9 (15 Oct 2018 08:03)  T(F): 97.4 (15 Oct 2018 20:28), Max: 98.5 (15 Oct 2018 08:03)  HR: 80 (16 Oct 2018 05:30) (68 - 80)  BP: 150/80 (16 Oct 2018 05:30) (147/87 - 161/82)  RR: 20 (15 Oct 2018 20:28) (18 - 20)  SpO2: 99% (15 Oct 2018 20:28) (97% - 99%)    Left groin - no erythema or warmth. +Induration along lower edge of left labia majora/inguinal area. No fluctuance, minimal tenderness to palpation.    Packing removed. Attempted to express opening, no drainage noted. Wound flushed using normal saline. Probed to ~3cm  Wound repacked with iodoform packing  Gauze placed over wound

## 2018-10-16 NOTE — PROGRESS NOTE ADULT - ASSESSMENT
pt  admitted  with  h/o abd  pain/ / left  groin pain       , with elevated wbc/  hyponatremia/  dehydration  pt with cellulitis,  of supra  pubic area, resolving    doppler legs, no dvt    elevated wbc, from  steroid,  and from  cellulitis/ supra pubic area  wbc  decreasing from  17,000 to 5,000   surg  eval , noted, no intervention  seen by gyn/ packing  in wound/  no drainage  now/   pt refusing   further   imaging  ,as f/p  no drainage from vulvar area  on zosyn/ Vanco/   defer duration to ID   wound  c/s  with  MRSA/   on bactrim for  4  more days  d/c today

## 2018-10-16 NOTE — PROGRESS NOTE ADULT - ASSESSMENT
74 yo female with PMHof  HTN, GERD,,  Breast CA 5  yeara ago, newly diagnosed with Ulcerative colitis (had  colonoscopy 3-4  weeks ago)   admitted with left groin cellulitis , clinically not c/w pyoderma gangrenosum. ID and GYN following    Anemia - without overt/brisk GI bleed, likely related to UC; guaiac negative this admission    PLAN  Continue Mesalamine  monitor off prednisone given acute infectious process  Pepcid for GI prophylaxis  Abx as per ID  wound care    Discharge planning as per Medicine    Parmjit Winn PA-C    [facility]  [phone]

## 2018-10-17 LAB
CULTURE RESULTS: SIGNIFICANT CHANGE UP
ORGANISM # SPEC MICROSCOPIC CNT: SIGNIFICANT CHANGE UP
SPECIMEN SOURCE: SIGNIFICANT CHANGE UP

## 2018-10-26 ENCOUNTER — APPOINTMENT (OUTPATIENT)
Dept: OBGYN | Facility: CLINIC | Age: 73
End: 2018-10-26
Payer: MEDICARE

## 2018-10-26 PROCEDURE — 99202 OFFICE O/P NEW SF 15 MIN: CPT

## 2018-11-11 PROCEDURE — 99285 EMERGENCY DEPT VISIT HI MDM: CPT | Mod: 25

## 2018-11-11 PROCEDURE — 83550 IRON BINDING TEST: CPT

## 2018-11-11 PROCEDURE — 96374 THER/PROPH/DIAG INJ IV PUSH: CPT | Mod: XU

## 2018-11-11 PROCEDURE — 84132 ASSAY OF SERUM POTASSIUM: CPT

## 2018-11-11 PROCEDURE — 80202 ASSAY OF VANCOMYCIN: CPT

## 2018-11-11 PROCEDURE — 93306 TTE W/DOPPLER COMPLETE: CPT

## 2018-11-11 PROCEDURE — 82330 ASSAY OF CALCIUM: CPT

## 2018-11-11 PROCEDURE — 83735 ASSAY OF MAGNESIUM: CPT

## 2018-11-11 PROCEDURE — 87040 BLOOD CULTURE FOR BACTERIA: CPT

## 2018-11-11 PROCEDURE — 84295 ASSAY OF SERUM SODIUM: CPT

## 2018-11-11 PROCEDURE — 85027 COMPLETE CBC AUTOMATED: CPT

## 2018-11-11 PROCEDURE — 85014 HEMATOCRIT: CPT

## 2018-11-11 PROCEDURE — 93970 EXTREMITY STUDY: CPT

## 2018-11-11 PROCEDURE — 85730 THROMBOPLASTIN TIME PARTIAL: CPT

## 2018-11-11 PROCEDURE — 80053 COMPREHEN METABOLIC PANEL: CPT

## 2018-11-11 PROCEDURE — 87070 CULTURE OTHR SPECIMN AEROBIC: CPT

## 2018-11-11 PROCEDURE — 83605 ASSAY OF LACTIC ACID: CPT

## 2018-11-11 PROCEDURE — 82272 OCCULT BLD FECES 1-3 TESTS: CPT

## 2018-11-11 PROCEDURE — 82435 ASSAY OF BLOOD CHLORIDE: CPT

## 2018-11-11 PROCEDURE — 87205 SMEAR GRAM STAIN: CPT

## 2018-11-11 PROCEDURE — 82803 BLOOD GASES ANY COMBINATION: CPT

## 2018-11-11 PROCEDURE — 85610 PROTHROMBIN TIME: CPT

## 2018-11-11 PROCEDURE — 80048 BASIC METABOLIC PNL TOTAL CA: CPT

## 2018-11-11 PROCEDURE — 87186 SC STD MICRODIL/AGAR DIL: CPT

## 2018-11-11 PROCEDURE — 82728 ASSAY OF FERRITIN: CPT

## 2018-11-11 PROCEDURE — 86140 C-REACTIVE PROTEIN: CPT

## 2018-11-11 PROCEDURE — 82947 ASSAY GLUCOSE BLOOD QUANT: CPT

## 2018-11-20 ENCOUNTER — APPOINTMENT (OUTPATIENT)
Dept: OBGYN | Facility: CLINIC | Age: 73
End: 2018-11-20
Payer: MEDICARE

## 2018-11-20 PROCEDURE — 99213 OFFICE O/P EST LOW 20 MIN: CPT

## 2018-12-04 ENCOUNTER — APPOINTMENT (OUTPATIENT)
Dept: OBGYN | Facility: CLINIC | Age: 73
End: 2018-12-04
Payer: MEDICARE

## 2018-12-04 PROCEDURE — 99213 OFFICE O/P EST LOW 20 MIN: CPT

## 2021-03-12 NOTE — ED PROVIDER NOTE - NS ED NOTE AC HIGH RISK COUNTRIES
Daily Note     Today's date: 3/12/2021  Patient name: Suleiman Marie  : 1958  MRN: [id number]  Referring provider: Jasson Taylor PA-C  Dx:   Encounter Diagnosis     ICD-10-CM    1  Chronic bilateral low back pain without sciatica  M54 5     G89 29                   Subjective: Pt with no significant changes noted  Pt states that pain is in the lower R side of back  Objective: See treatment diary below      Assessment: Pt tolerates strengthening exercises well  Pt with significant fatigue post exercises indicating therapeutic dosage achieved  PT held on manual therapy as pt with increased pain at QL  Performed lumbar flexion pball roll to conclude session with relief noted  PT should monitor pt response and continue per pt ability  Plan: Continue per plan of care  Precautions: B TRIP      Manuals  3/12           R QL TPR                                                    Neuro Re-Ed             TA retraining  PPT w/ Tball Sq  20x 5"           Standing press down into pball for core activation  Seated 10x 5"           Pt education  Importance of posture, biomechanics                                                               Ther Ex             Bike  8'           Lateral stepping  Red @ knee 3x12'           STS  2x10            Wobble board             Palloff press             Hip abduction  Stand 2# 2x10 each              Hip Extension             SKTC  5x10" each           Pball lumbar flex 3 way  10x 5" each           LTR             Ther Activity                                       Gait Training                                       Modalities
No

## 2021-06-28 NOTE — DISCHARGE NOTE ADULT - MEDICATION SUMMARY - MEDICATIONS TO TAKE
Have Your Skin Lesions Been Treated?: not been treated Is This A New Presentation, Or A Follow-Up?: Growth How Severe Is Your Skin Lesion?: moderate I will START or STAY ON the medications listed below when I get home from the hospital:    Apriso 0.375 g oral capsule, extended release  -- 4 cap(s) by mouth once a day  -- Indication: For Colitis    aspirin 81 mg oral tablet  -- 1 tab(s) by mouth once a day  -- Indication: For PPX    Lipitor 10 mg oral tablet  -- 1 tab(s) by mouth every other day  -- Indication: For Antilipid    Toprol-XL 25 mg oral tablet, extended release  -- 1 tab(s) by mouth once a day  -- Indication: For HTN    hydroCHLOROthiazide 25 mg oral tablet  -- 1 tab(s) by mouth once a day  -- Indication: For HTN    Klor-Con 10 oral tablet, extended release  -- 1 tab(s) by mouth once a day  -- Indication: For Supplement    lactobacillus acidophilus oral capsule  -- 1 tab(s) by mouth 2 times a day (with meals)  -- Indication: For Supplement    pantoprazole 40 mg oral delayed release tablet  -- 1 tab(s) by mouth once a day  -- Indication: For PPI    sulfamethoxazole-trimethoprim 800 mg-160 mg oral tablet  -- 1 tab(s) by mouth 2 times a day  -- Indication: For Cellulitis    Vitamin D3 1000 intl units oral tablet  -- 1 tab(s) by mouth once a day  -- Indication: For Supplement

## 2021-07-13 ENCOUNTER — APPOINTMENT (OUTPATIENT)
Dept: ORTHOPEDIC SURGERY | Facility: CLINIC | Age: 76
End: 2021-07-13
Payer: MEDICARE

## 2021-07-13 VITALS
HEART RATE: 56 BPM | HEIGHT: 62 IN | WEIGHT: 179 LBS | SYSTOLIC BLOOD PRESSURE: 156 MMHG | BODY MASS INDEX: 32.94 KG/M2 | DIASTOLIC BLOOD PRESSURE: 83 MMHG

## 2021-07-13 DIAGNOSIS — Z87.39 PERSONAL HISTORY OF OTHER DISEASES OF THE MUSCULOSKELETAL SYSTEM AND CONNECTIVE TISSUE: ICD-10-CM

## 2021-07-13 DIAGNOSIS — M17.11 UNILATERAL PRIMARY OSTEOARTHRITIS, RIGHT KNEE: ICD-10-CM

## 2021-07-13 DIAGNOSIS — Z80.9 FAMILY HISTORY OF MALIGNANT NEOPLASM, UNSPECIFIED: ICD-10-CM

## 2021-07-13 DIAGNOSIS — Z85.3 PERSONAL HISTORY OF MALIGNANT NEOPLASM OF BREAST: ICD-10-CM

## 2021-07-13 PROCEDURE — 73562 X-RAY EXAM OF KNEE 3: CPT | Mod: RT

## 2021-07-13 PROCEDURE — 99204 OFFICE O/P NEW MOD 45 MIN: CPT | Mod: 25

## 2021-07-13 PROCEDURE — 20610 DRAIN/INJ JOINT/BURSA W/O US: CPT | Mod: RT

## 2021-07-13 RX ORDER — POTASSIUM CHLORIDE 750 MG/1
10 TABLET, FILM COATED, EXTENDED RELEASE ORAL
Qty: 90 | Refills: 0 | Status: ACTIVE | COMMUNITY
Start: 2021-06-01

## 2021-07-13 RX ORDER — ATORVASTATIN CALCIUM 10 MG/1
10 TABLET, FILM COATED ORAL
Qty: 90 | Refills: 0 | Status: ACTIVE | COMMUNITY
Start: 2021-06-01

## 2021-07-13 RX ORDER — METOPROLOL SUCCINATE 25 MG/1
25 TABLET, EXTENDED RELEASE ORAL
Qty: 90 | Refills: 0 | Status: ACTIVE | COMMUNITY
Start: 2021-06-01

## 2021-07-13 RX ORDER — FERROUS SULFATE TAB EC 325 MG (65 MG FE EQUIVALENT) 325 (65 FE) MG
325 (65 FE) TABLET DELAYED RESPONSE ORAL
Qty: 90 | Refills: 0 | Status: ACTIVE | COMMUNITY
Start: 2021-06-01

## 2021-07-13 RX ORDER — LIDOCAINE HYDROCHLORIDE 10 MG/ML
1 INJECTION, SOLUTION INFILTRATION; PERINEURAL
Refills: 0 | Status: COMPLETED | OUTPATIENT
Start: 2021-07-13

## 2021-07-13 RX ORDER — METHIMAZOLE 5 MG/1
5 TABLET ORAL
Qty: 90 | Refills: 0 | Status: ACTIVE | COMMUNITY
Start: 2021-06-01

## 2021-07-13 RX ORDER — LACTOBACIL 2/BIFIDO 1/S.THERMO 900B CELL
PACKET (EA) ORAL
Refills: 0 | Status: ACTIVE | COMMUNITY

## 2021-07-13 RX ORDER — HYALURONATE SOD, CROSS-LINKED 30 MG/3 ML
30 SYRINGE (ML) INTRAARTICULAR
Refills: 0 | Status: COMPLETED | OUTPATIENT
Start: 2021-07-13

## 2021-07-13 RX ORDER — MESALAMINE 0.38 G/1
0.38 CAPSULE, EXTENDED RELEASE ORAL
Qty: 120 | Refills: 0 | Status: ACTIVE | COMMUNITY
Start: 2021-07-11

## 2021-07-13 RX ADMIN — LIDOCAINE HYDROCHLORIDE 3 %: 10 INJECTION, SOLUTION INFILTRATION; PERINEURAL at 00:00

## 2021-07-13 RX ADMIN — Medication 0 MG/3ML: at 00:00

## 2021-07-13 NOTE — PROCEDURE
[Injection] : Injection [Right] : of the right [Knee Joint] : knee joint [Osteoarthritis] : Osteoarthritis [Inflammation] : Inflammation [Joint Pain] : Joint pain [Patient] : patient [Risk] : risk [Benefits] : benefits [Alternatives] : alternatives [Bleeding] : bleeding [Infection] : infection [Allergic Reaction] : allergic reaction [Verbal Consent Obtained] : verbal consent was obtained prior to the procedure [Ethyl Chloride Spray] : ethyl chloride spray was used as a topical anesthetic [___mL] : [unfilled] ~UmL of lidocaine [1%] : 1% lidocaine [Without Epi] : without epinephrine [Medial] : medial [Anterior] : anterior [22] : a 22-gauge [1.5  inch] : 1.5 inch needle [Bandage Applied] : a bandage [Tolerated Well] : The patient tolerated the procedure well [None] : none [No Strenuous Activity___day(s)] : avoid strenuous activity for [unfilled] day(s) [PRN] : as needed [FreeTextEntry1] : chlorahexadine [FreeTextEntry8] : gel one

## 2021-07-13 NOTE — REASON FOR VISIT
[Initial Visit] : an initial visit for [Knee Pain] : knee pain [FreeTextEntry2] : Right knee osteoarthritis pain denied intermittent buckling

## 2021-07-13 NOTE — PHYSICAL EXAM
[LE] : Sensory: Intact in bilateral lower extremities [DP] : dorsalis pedis 2+ and symmetric bilaterally [PT] : posterior tibial 2+ and symmetric bilaterally [Obese] : obese [Normal] : no peripheral adenopathy appreciated [de-identified] : Examination of the right knee: the skin is warm and dry with no lesions there is tenderness over the lateral joint line there is no significant palpable effusion there is neutral alignment of the knee noted range of motion is 0-120° of flexion ligamentous exam is stable varus stress anterior posterior drawer. [de-identified] :  views of the right knee: there is tricompartmental asked her to changes noted worsening lateral and patellofemoral compartments with joint space narrowing periarticular osteophytes and cystic changes. There is no acute bony abnormalities.

## 2021-07-13 NOTE — DISCUSSION/SUMMARY
[Medication Risks Reviewed] : Medication risks reviewed [de-identified] : Right knee osteoarthritis\par \par \par \par \par The patient had exacerbation of the right knee osteoarthritis treatment options were reviewed with her she might benefit from viscous supplementation injections. Risks benefits alternatives to her right kneethis was supplementation injection which 01 were reviewed the patient postinjection sugars are given the patient understands and like to pursue the injection we'll see her back on an as-needed basis.

## 2021-07-13 NOTE — HISTORY OF PRESENT ILLNESS
[de-identified] : 76-year-old female presents with right knee pain with no recent medication injury. She has a known history of right knee osteoarthritis she has been seen several years a month ago she will cup to go the bathroom she had immediate 10 out of 10 pain in the right knee she swan patches ibuprofen and applied ice with good relief on today's visit she is really no pain. She does get exacerbation of her symptoms from time to time. She denies fever chills night sweats has no paresthesias no showing no orthopedic complaints.

## 2022-05-13 NOTE — ED ADULT TRIAGE NOTE - BMI (KG/M2)
Patient: Phill Powell Date: 2022   : 1960 Attending: Shabbir Morrow MD   59year old female      [facility] [address]   ([address] [facility] - Not for use with Conscious Sedation)    Preop Diagnosis / Indications for Procedure: ascites     Planned Procedure: paracentesis     Planned Anesthetic:  local      MEDICAL HISTORY / COMORBID CONDITIONS:  Significant medical / surgical history: no    Normal mental status:   yes       EXAMINATION PERTINENT TO PROCEDURE BEING PERFORMED  Evaluation of operative site fluid seen on us      OTHER FINDINGS  Reviewed current medications and allergies yes      PERTINENT LAB / DIAGNOSTIC TESTS  NONE      INFORMED CONSENT  Consent obtained: yes    Risks / benefits, complications, and alternatives explained, questions answered and patient / personal representative agrees to:  procedure listed above    Under direct supervision of Dr. Tim Ybarra
32.9

## 2023-04-05 ENCOUNTER — EMERGENCY (EMERGENCY)
Facility: HOSPITAL | Age: 78
LOS: 1 days | Discharge: HOME | End: 2023-04-05
Attending: EMERGENCY MEDICINE | Admitting: EMERGENCY MEDICINE
Payer: MEDICARE

## 2023-04-05 VITALS
TEMPERATURE: 98 F | SYSTOLIC BLOOD PRESSURE: 147 MMHG | HEART RATE: 56 BPM | DIASTOLIC BLOOD PRESSURE: 80 MMHG | RESPIRATION RATE: 15 BRPM | OXYGEN SATURATION: 99 %

## 2023-04-05 VITALS
DIASTOLIC BLOOD PRESSURE: 84 MMHG | WEIGHT: 175.05 LBS | HEART RATE: 53 BPM | OXYGEN SATURATION: 95 % | SYSTOLIC BLOOD PRESSURE: 150 MMHG | RESPIRATION RATE: 15 BRPM | TEMPERATURE: 97 F | HEIGHT: 62 IN

## 2023-04-05 PROCEDURE — 73030 X-RAY EXAM OF SHOULDER: CPT | Mod: 26,LT

## 2023-04-05 PROCEDURE — 99284 EMERGENCY DEPT VISIT MOD MDM: CPT | Mod: 25

## 2023-04-05 PROCEDURE — 99284 EMERGENCY DEPT VISIT MOD MDM: CPT

## 2023-04-05 PROCEDURE — 24670 CLTX ULNAR FX PROX W/O MNPJ: CPT | Mod: LT

## 2023-04-05 PROCEDURE — 73060 X-RAY EXAM OF HUMERUS: CPT

## 2023-04-05 PROCEDURE — 73030 X-RAY EXAM OF SHOULDER: CPT

## 2023-04-05 PROCEDURE — 73060 X-RAY EXAM OF HUMERUS: CPT | Mod: 26,LT

## 2023-04-05 PROCEDURE — 73080 X-RAY EXAM OF ELBOW: CPT

## 2023-04-05 PROCEDURE — 73000 X-RAY EXAM OF COLLAR BONE: CPT | Mod: 26,LT

## 2023-04-05 PROCEDURE — 72170 X-RAY EXAM OF PELVIS: CPT

## 2023-04-05 PROCEDURE — 72170 X-RAY EXAM OF PELVIS: CPT | Mod: 26

## 2023-04-05 PROCEDURE — 73080 X-RAY EXAM OF ELBOW: CPT | Mod: 26,LT

## 2023-04-05 PROCEDURE — 73000 X-RAY EXAM OF COLLAR BONE: CPT

## 2023-04-05 RX ORDER — IBUPROFEN 200 MG
600 TABLET ORAL ONCE
Refills: 0 | Status: COMPLETED | OUTPATIENT
Start: 2023-04-05 | End: 2023-04-05

## 2023-04-05 RX ORDER — OXYCODONE AND ACETAMINOPHEN 5; 325 MG/1; MG/1
1 TABLET ORAL ONCE
Refills: 0 | Status: DISCONTINUED | OUTPATIENT
Start: 2023-04-05 | End: 2023-04-05

## 2023-04-05 RX ADMIN — Medication 600 MILLIGRAM(S): at 20:16

## 2023-04-05 RX ADMIN — Medication 600 MILLIGRAM(S): at 19:46

## 2023-04-05 NOTE — ED ADULT TRIAGE NOTE - CHIEF COMPLAINT QUOTE
I fell and landed on my left side at home, patient is able to move her wrist but unable to move her shoulder, no head trauma, patient stated she tripped and fall and denies dizziness or loc, no blood thinner,

## 2023-04-05 NOTE — ED ADULT NURSE NOTE - HAVE YOU RECEIVED AT LEAST TWO PFIZER AND/OR MODERNA VACCINATIONS (IN ANY COMBINATION) AND/OR ONE JOHNSON & JOHNSON VACCINATION?
Patient called and stated that his Lab Orders are now  and would like the dates extended so that he can have them done,Please call, Thanks   Yes

## 2023-04-05 NOTE — CONSULT NOTE ADULT - SUBJECTIVE AND OBJECTIVE BOX
77yo female with PMHx of UC, HTN with left shoulder pain and arm pain s/p mechanical fall. pt tripped and fell in her bathroom and fell on her left side, no head trauma no LOC, pt c/o left shoulder pain, diffuse non radiating pt was initially taken to another hospital and her son picked her up from there and brought her here, no other complaints. no tingling, numbness.    Vital Signs Last 24 Hrs  T(C): 36.3 (04-05-23 @ 18:52), Max: 36.3 (04-05-23 @ 18:52)  T(F): 97.3 (04-05-23 @ 18:52), Max: 97.3 (04-05-23 @ 18:52)  HR: 53 (04-05-23 @ 18:52) (53 - 53)  BP: 150/84 (04-05-23 @ 18:52) (150/84 - 150/84)  BP(mean): --  RR: 15 (04-05-23 @ 18:52) (15 - 15)  SpO2: 95% (04-05-23 @ 18:52) (95% - 95%)    PE: Left UE: no open wounds, + ecchymosis over anterior shoulder, + TTP over proximal clavicle, posterior elbow, decreased ROM of the elbow, + AIN,PIN, ulnar,radial motor/sensory intact , radial pulse +2    Left Clavicle fracture: + OA, no acute fracture  Left humerus: no acute fracture  Left elbow: + displaced proximal olecranon fracture     A/P: 79 yo F with Left olecranon fracture  - NWB LUE  - closed reduction posterior splint applied  - Sling  - elevation  - ice 20 minutes on alternating with 20 minutes off for 48 hours post op  - pain control prn  - follow up with orthopedic within a week

## 2023-04-05 NOTE — ED PROVIDER NOTE - CARE PROVIDER_API CALL
Elliott Limon)  Orthopaedic Surgery  [address]  [place] [address]  Phone: [phone]  Fax: [phone]  Follow Up Time:

## 2023-04-05 NOTE — ED ADULT NURSE NOTE - OBJECTIVE STATEMENT
Pt is alert and oriented. Pt states that she fell in the bathroom onto her left side. Pt denies LOC, blood thinners or hitting her head. Pt states that she has pain in her left elbow, left clavicle and pelvic area. Pt denies sob, chest pain, nausea, vomiting, and dizziness. Pt resp are even and unlabored, skin color aretha for race. Pt updated on plan of care.

## 2023-04-05 NOTE — ED PROVIDER NOTE - PATIENT PORTAL LINK FT
You can access the FollowMyHealth Patient Portal offered by [facility] by registering at the following website: [email or web address]. By joining [employer]’s FollowMyHealth portal, you will also be able to view your health information using other applications (apps) compatible with our system.

## 2023-04-05 NOTE — ED PROVIDER NOTE - OBJECTIVE STATEMENT
79yo female with left shoulder pain and arm pain s/p fall. pt tripped and fell in her bathroom over the toekick and fell on her left side, no head trauma no LOC, pt c/o left shoulder pain, diffuse non radiating pt was initially taken to another hospital and her son picked her up from there and brought her here, no other complaints

## 2023-04-05 NOTE — ED PROVIDER NOTE - PROGRESS NOTE DETAILS
ortho pa at bedside, confirms elbow fx, is placing pt in sling, follow up with ortho, return if any symptoms worsen

## 2023-04-10 ENCOUNTER — APPOINTMENT (OUTPATIENT)
Dept: ORTHOPEDIC SURGERY | Facility: CLINIC | Age: 78
End: 2023-04-10
Payer: MEDICARE

## 2023-04-10 PROCEDURE — 99213 OFFICE O/P EST LOW 20 MIN: CPT

## 2023-04-10 PROCEDURE — 99203 OFFICE O/P NEW LOW 30 MIN: CPT

## 2023-04-10 RX ORDER — FOLIC ACID 1 MG/1
1 TABLET ORAL
Qty: 90 | Refills: 0 | Status: ACTIVE | COMMUNITY
Start: 2023-02-03

## 2023-04-10 RX ORDER — ENALAPRIL MALEATE 10 MG/1
10 TABLET ORAL
Qty: 90 | Refills: 0 | Status: ACTIVE | COMMUNITY
Start: 2023-03-13

## 2023-04-10 RX ORDER — ENALAPRIL MALEATE 2.5 MG/1
2.5 TABLET ORAL
Qty: 29 | Refills: 0 | Status: ACTIVE | COMMUNITY
Start: 2022-10-21

## 2023-04-10 RX ORDER — ENALAPRIL MALEATE 5 MG/1
5 TABLET ORAL
Qty: 90 | Refills: 0 | Status: ACTIVE | COMMUNITY
Start: 2022-11-09

## 2023-04-10 RX ORDER — CITALOPRAM HYDROBROMIDE 10 MG/1
10 TABLET, FILM COATED ORAL
Qty: 90 | Refills: 0 | Status: ACTIVE | COMMUNITY
Start: 2023-01-26

## 2023-04-10 RX ORDER — ADALIMUMAB 40MG/0.4ML
40 KIT SUBCUTANEOUS
Qty: 2 | Refills: 0 | Status: ACTIVE | COMMUNITY
Start: 2023-03-30

## 2023-04-10 NOTE — ADDENDUM
[FreeTextEntry1] : I, Bee Oh wrote this note acting as a scribe for Dr. Elliott Limon on Apr 10, 2023.

## 2023-04-10 NOTE — PHYSICAL EXAM
[de-identified] : Patient is WDWN, alert, and in no acute distress. Breathing is unlabored. She is grossly oriented to person, place, and time.\par \par She is accompanied by her son-in-law and daughter.\par \par Left Elbow:\par She presents in a posterior splint and sling, which were removed.\par There is ecchymosis and edema noted throughout the upper extremity, worst to the dorsum of the hand and digits.\par Focal tenderness along the proximal ulna, in the region of the fracture.\par ROM to the left elbow was accessed with limitations due to injury and pain.\par Full left shoulder motion.\par Gross edema noted to the dorsum of the hand.\par Digital motion into flexion and extension is limited due to edema.\par Sensation is intact along the median, ulnar and radial nerve distributions. [de-identified] : EXAM: XR HUMERUS MIN 2 VIEWS LT\par EXAM: XR ELBOW COMP MIN 3V LT\par PROCEDURE DATE: 04/05/2023\par IMPRESSION:\par Left humerus. 2 views.\par There is diffuse moderate degeneration around the humeral head.\par \par Left elbow. 3 views. 4 images.\par There is a comminuted fracture of the proximal ulna with displacement.\par \par LIBIA BURKETT MD; Attending Radiologist\par This document has been electronically signed. Apr 6 2023 10:02AM\par \par ------------------------------------------------------------------------------------------------------------------------------------------------------------------------------------ \par \par EXAM: XR SHOULDER COMP MIN 2V LT\par PROCEDURE DATE: 04/05/2023\par IMPRESSION:\par No fracture, dislocation or destructive bone lesion.\par The humeral head is located within glenoid fossa.\par Moderate AC joint arthrosis with superior surface spurring. No pathologic calcifications or soft tissue abnormalities.\par The visualized lung upper zone and ribs are within normal limits..\par \par LIBIA MORALES MD; Attending Radiologist\par This document has been electronically signed. Apr 5 2023 8:44PM\par \par ------------------------------------------------------------------------------------------------------------------------------------------------------------------------------------ \par \par EXAM: XR CLAVICLE COMPLETE LT\par PROCEDURE DATE: 04/05/2023\par IMPRESSION:\par No fracture, AC joint osteoarthrosis. No fracture or gross dislocation.\par \par LIBIA MORALES MD; Attending Radiologist\par This document has been electronically signed. Apr 5 2023 8:47PM\par \par ------------------------------------------------------------------------------------------------------------------------------------------------------------------------------------\par \par EXAM: XR PELVIS AP ONLY 1-2 VIEWS\par PROCEDURE DATE: 04/05/2023\par IMPRESSION: \par No acute radiographic osseous pathology..\par \par If pain persist despite conservative therapy and patient is unable to walk, a follow-up noncontrast CT/MRI scan recommended to exclude occult fractures or soft tissue injuries not evident on plain film radiography.\par \par LIBIA MORALES MD; Attending Radiologist\par This document has been electronically signed. Apr 5 2023 8:45PM

## 2023-04-10 NOTE — DISCUSSION/SUMMARY
[de-identified] : The underlying pathophysiology was reviewed with the patient. XR films were reviewed with the patient. Discussed at length the nature of the patient’s condition. The left elbow symptoms appear secondary to a comminuted fracture of the proximal ulna.\par \par At this time, given the nature of the injury as documented above, I recommended operative management of ORIF.\par The patient wishes to proceed with ORIF of LEFT proximal ulna fracture at this time. The risks and benefits were reviewed with the patient. All of her questions were answered. She will meet with our surgical scheduler and be scheduled for surgery this Friday on 4/14/23. \par \par In the interim, she was instructed to gentle ROM exercises of the shoulder, wrist and digits. I recommended elevation and pumping of the hand to reduce edema. Tylenol prn.

## 2023-04-10 NOTE — END OF VISIT
[FreeTextEntry3] : All medical record entries made by the Scribe were at my,  Dr. Elliott Limon MD., direction and personally dictated by me on 04/10/2023. I have personally reviewed the chart and agree that the record accurately reflects my personal performance of the history, physical exam, assessment and plan.

## 2023-04-10 NOTE — HISTORY OF PRESENT ILLNESS
[de-identified] : Pt is a RHD 77 y/o female with a left elbow injury sustained on Wednesday 4/5/23. She reports to have fallen in her home. She tripped and fell over the saddle of her bathroom door. She was seen at [place] ED, where xrays were obtained and revealed a comminuted proximal ulna fracture. She was splinted and advised to follow up with a specialist. She complains of pain as well as swelling and bruising since the injury. She is taking Tylenol for pain.

## 2023-04-11 ENCOUNTER — OUTPATIENT (OUTPATIENT)
Dept: OUTPATIENT SERVICES | Facility: HOSPITAL | Age: 78
LOS: 1 days | End: 2023-04-11
Payer: MEDICARE

## 2023-04-11 VITALS
OXYGEN SATURATION: 97 % | HEIGHT: 62 IN | RESPIRATION RATE: 14 BRPM | HEART RATE: 71 BPM | DIASTOLIC BLOOD PRESSURE: 81 MMHG | TEMPERATURE: 98 F | SYSTOLIC BLOOD PRESSURE: 163 MMHG | WEIGHT: 175.05 LBS

## 2023-04-11 DIAGNOSIS — S52.022A DISPLACED FRACTURE OF OLECRANON PROCESS WITHOUT INTRAARTICULAR EXTENSION OF LEFT ULNA, INITIAL ENCOUNTER FOR CLOSED FRACTURE: ICD-10-CM

## 2023-04-11 DIAGNOSIS — Z90.49 ACQUIRED ABSENCE OF OTHER SPECIFIED PARTS OF DIGESTIVE TRACT: Chronic | ICD-10-CM

## 2023-04-11 DIAGNOSIS — Z98.890 OTHER SPECIFIED POSTPROCEDURAL STATES: Chronic | ICD-10-CM

## 2023-04-11 DIAGNOSIS — Z98.1 ARTHRODESIS STATUS: Chronic | ICD-10-CM

## 2023-04-11 DIAGNOSIS — S52.252D: ICD-10-CM

## 2023-04-11 DIAGNOSIS — Z90.721 ACQUIRED ABSENCE OF OVARIES, UNILATERAL: Chronic | ICD-10-CM

## 2023-04-11 DIAGNOSIS — Z98.49 CATARACT EXTRACTION STATUS, UNSPECIFIED EYE: Chronic | ICD-10-CM

## 2023-04-11 DIAGNOSIS — Z90.710 ACQUIRED ABSENCE OF BOTH CERVIX AND UTERUS: Chronic | ICD-10-CM

## 2023-04-11 DIAGNOSIS — S52.022S DISPLACED FRACTURE OF OLECRANON PROCESS WITHOUT INTRAARTICULAR EXTENSION OF LEFT ULNA, SEQUELA: ICD-10-CM

## 2023-04-11 DIAGNOSIS — Z01.818 ENCOUNTER FOR OTHER PREPROCEDURAL EXAMINATION: ICD-10-CM

## 2023-04-11 LAB
ALBUMIN SERPL ELPH-MCNC: 3.4 G/DL — SIGNIFICANT CHANGE UP (ref 3.3–5)
ALP SERPL-CCNC: 59 U/L — SIGNIFICANT CHANGE UP (ref 30–120)
ALT FLD-CCNC: 27 U/L DA — SIGNIFICANT CHANGE UP (ref 10–60)
ANION GAP SERPL CALC-SCNC: 9 MMOL/L — SIGNIFICANT CHANGE UP (ref 5–17)
AST SERPL-CCNC: 21 U/L — SIGNIFICANT CHANGE UP (ref 10–40)
BILIRUB SERPL-MCNC: 1 MG/DL — SIGNIFICANT CHANGE UP (ref 0.2–1.2)
BUN SERPL-MCNC: 28 MG/DL — HIGH (ref 7–23)
CALCIUM SERPL-MCNC: 9.6 MG/DL — SIGNIFICANT CHANGE UP (ref 8.4–10.5)
CHLORIDE SERPL-SCNC: 102 MMOL/L — SIGNIFICANT CHANGE UP (ref 96–108)
CO2 SERPL-SCNC: 27 MMOL/L — SIGNIFICANT CHANGE UP (ref 22–31)
CREAT SERPL-MCNC: 0.74 MG/DL — SIGNIFICANT CHANGE UP (ref 0.5–1.3)
EGFR: 82 ML/MIN/1.73M2 — SIGNIFICANT CHANGE UP
GLUCOSE SERPL-MCNC: 101 MG/DL — HIGH (ref 70–99)
HCT VFR BLD CALC: 34.8 % — SIGNIFICANT CHANGE UP (ref 34.5–45)
HGB BLD-MCNC: 11.3 G/DL — LOW (ref 11.5–15.5)
MCHC RBC-ENTMCNC: 27.7 PG — SIGNIFICANT CHANGE UP (ref 27–34)
MCHC RBC-ENTMCNC: 32.5 GM/DL — SIGNIFICANT CHANGE UP (ref 32–36)
MCV RBC AUTO: 85.3 FL — SIGNIFICANT CHANGE UP (ref 80–100)
NRBC # BLD: 0 /100 WBCS — SIGNIFICANT CHANGE UP (ref 0–0)
PLATELET # BLD AUTO: 209 K/UL — SIGNIFICANT CHANGE UP (ref 150–400)
POTASSIUM SERPL-MCNC: 4.3 MMOL/L — SIGNIFICANT CHANGE UP (ref 3.5–5.3)
POTASSIUM SERPL-SCNC: 4.3 MMOL/L — SIGNIFICANT CHANGE UP (ref 3.5–5.3)
PROT SERPL-MCNC: 7.4 G/DL — SIGNIFICANT CHANGE UP (ref 6–8.3)
RBC # BLD: 4.08 M/UL — SIGNIFICANT CHANGE UP (ref 3.8–5.2)
RBC # FLD: 14.2 % — SIGNIFICANT CHANGE UP (ref 10.3–14.5)
SODIUM SERPL-SCNC: 138 MMOL/L — SIGNIFICANT CHANGE UP (ref 135–145)
WBC # BLD: 5.55 K/UL — SIGNIFICANT CHANGE UP (ref 3.8–10.5)
WBC # FLD AUTO: 5.55 K/UL — SIGNIFICANT CHANGE UP (ref 3.8–10.5)

## 2023-04-11 PROCEDURE — 36415 COLL VENOUS BLD VENIPUNCTURE: CPT

## 2023-04-11 PROCEDURE — 85027 COMPLETE CBC AUTOMATED: CPT

## 2023-04-11 PROCEDURE — 80053 COMPREHEN METABOLIC PANEL: CPT

## 2023-04-11 PROCEDURE — G0463: CPT

## 2023-04-11 RX ORDER — MESALAMINE 400 MG
4 TABLET, DELAYED RELEASE (ENTERIC COATED) ORAL
Qty: 0 | Refills: 0 | DISCHARGE

## 2023-04-11 RX ORDER — ASPIRIN/CALCIUM CARB/MAGNESIUM 324 MG
1 TABLET ORAL
Qty: 0 | Refills: 0 | DISCHARGE

## 2023-04-11 RX ORDER — HYDROCHLOROTHIAZIDE 25 MG
1 TABLET ORAL
Qty: 0 | Refills: 0 | DISCHARGE

## 2023-04-11 NOTE — H&P PST ADULT - NEUROLOGICAL COMMENTS
Hydroxychloroquine Pregnancy And Lactation Text: This medication has been shown to cause fetal harm but it isn't assigned a Pregnancy Risk Category. There are small amounts excreted in breast milk. unremarkable

## 2023-04-11 NOTE — H&P PST ADULT - HISTORY OF PRESENT ILLNESS
this is a 79 y/o female who fell 1 week ago and fractured left elbow, injured pelvis and left neck and shoulder; to have ORIF left elbow

## 2023-04-11 NOTE — H&P PST ADULT - NSICDXPASTSURGICALHX_GEN_ALL_CORE_FT
PAST SURGICAL HISTORY:  S/P appendectomy     S/P cataract surgery     S/P hysterectomy     S/P lumbar fusion     S/P lumbar spine operation     S/P lumpectomy, left breast     S/P right oophorectomy

## 2023-04-11 NOTE — H&P PST ADULT - NSICDXFAMILYHX_GEN_ALL_CORE_FT
FAMILY HISTORY:  Father  Still living? No  Family hx of lung cancer, Age at diagnosis: Age Unknown    Mother  Still living? No  Family hx of colon cancer, Age at diagnosis: Age Unknown    Sibling  Still living? No  Family hx of lung cancer, Age at diagnosis: Age Unknown

## 2023-04-11 NOTE — H&P PST ADULT - NSICDXPASTMEDICALHX_GEN_ALL_CORE_FT
PAST MEDICAL HISTORY:  Breast cancer     Colitis     Gastric ulcer     GERD (gastroesophageal reflux disease)     H/O ulcerative colitis     H/O vertigo     History of diverticulitis     Hyperlipemia     Hypertension     Mild anemia

## 2023-04-13 ENCOUNTER — TRANSCRIPTION ENCOUNTER (OUTPATIENT)
Age: 78
End: 2023-04-13

## 2023-04-14 ENCOUNTER — APPOINTMENT (OUTPATIENT)
Dept: ORTHOPEDIC SURGERY | Facility: HOSPITAL | Age: 78
End: 2023-04-14

## 2023-04-14 ENCOUNTER — OUTPATIENT (OUTPATIENT)
Dept: INPATIENT UNIT | Facility: HOSPITAL | Age: 78
LOS: 1 days | End: 2023-04-14
Payer: MEDICARE

## 2023-04-14 ENCOUNTER — TRANSCRIPTION ENCOUNTER (OUTPATIENT)
Age: 78
End: 2023-04-14

## 2023-04-14 VITALS
HEIGHT: 62 IN | HEART RATE: 67 BPM | SYSTOLIC BLOOD PRESSURE: 172 MMHG | TEMPERATURE: 98 F | DIASTOLIC BLOOD PRESSURE: 70 MMHG | RESPIRATION RATE: 23 BRPM | OXYGEN SATURATION: 100 % | WEIGHT: 171.08 LBS

## 2023-04-14 VITALS
OXYGEN SATURATION: 100 % | DIASTOLIC BLOOD PRESSURE: 54 MMHG | HEART RATE: 56 BPM | RESPIRATION RATE: 13 BRPM | SYSTOLIC BLOOD PRESSURE: 123 MMHG

## 2023-04-14 DIAGNOSIS — Z98.890 OTHER SPECIFIED POSTPROCEDURAL STATES: Chronic | ICD-10-CM

## 2023-04-14 DIAGNOSIS — Z90.49 ACQUIRED ABSENCE OF OTHER SPECIFIED PARTS OF DIGESTIVE TRACT: Chronic | ICD-10-CM

## 2023-04-14 DIAGNOSIS — Z98.1 ARTHRODESIS STATUS: Chronic | ICD-10-CM

## 2023-04-14 DIAGNOSIS — Z98.49 CATARACT EXTRACTION STATUS, UNSPECIFIED EYE: Chronic | ICD-10-CM

## 2023-04-14 DIAGNOSIS — Z90.710 ACQUIRED ABSENCE OF BOTH CERVIX AND UTERUS: Chronic | ICD-10-CM

## 2023-04-14 DIAGNOSIS — S52.252D: ICD-10-CM

## 2023-04-14 DIAGNOSIS — Z90.721 ACQUIRED ABSENCE OF OVARIES, UNILATERAL: Chronic | ICD-10-CM

## 2023-04-14 PROCEDURE — 24685 OPTX ULNAR FX PROX END W/FIX: CPT | Mod: LT

## 2023-04-14 PROCEDURE — 93010 ELECTROCARDIOGRAM REPORT: CPT

## 2023-04-14 PROCEDURE — C1713: CPT

## 2023-04-14 PROCEDURE — 76000 FLUOROSCOPY <1 HR PHYS/QHP: CPT

## 2023-04-14 PROCEDURE — 24685 OPTX ULNAR FX PROX END W/FIX: CPT | Mod: AS,LT

## 2023-04-14 PROCEDURE — 93005 ELECTROCARDIOGRAM TRACING: CPT

## 2023-04-14 DEVICE — IMPLANTABLE DEVICE: Type: IMPLANTABLE DEVICE | Site: LEFT | Status: FUNCTIONAL

## 2023-04-14 DEVICE — SCREW SLFTP METAPHYSEAL 2.7X40MM: Type: IMPLANTABLE DEVICE | Site: LEFT | Status: FUNCTIONAL

## 2023-04-14 DEVICE — K-WIRE SYNTHES TROCAR POINT 1.25MM X 150MM: Type: IMPLANTABLE DEVICE | Site: LEFT | Status: FUNCTIONAL

## 2023-04-14 DEVICE — SCREW LKG VA SLF TAP W/ T8 STARDRIVE 2.7X22MM: Type: IMPLANTABLE DEVICE | Site: LEFT | Status: FUNCTIONAL

## 2023-04-14 RX ORDER — METHIMAZOLE 10 MG/1
1 TABLET ORAL
Refills: 0 | DISCHARGE

## 2023-04-14 RX ORDER — ACETAMINOPHEN 500 MG
1000 TABLET ORAL ONCE
Refills: 0 | Status: COMPLETED | OUTPATIENT
Start: 2023-04-14 | End: 2023-04-14

## 2023-04-14 RX ORDER — SODIUM CHLORIDE 9 MG/ML
1000 INJECTION, SOLUTION INTRAVENOUS
Refills: 0 | Status: DISCONTINUED | OUTPATIENT
Start: 2023-04-14 | End: 2023-04-17

## 2023-04-14 RX ORDER — CITALOPRAM 10 MG/1
1 TABLET, FILM COATED ORAL
Refills: 0 | DISCHARGE

## 2023-04-14 RX ORDER — TRAMADOL HYDROCHLORIDE 50 MG/1
1 TABLET ORAL
Qty: 12 | Refills: 0
Start: 2023-04-14

## 2023-04-14 RX ORDER — HYDROMORPHONE HYDROCHLORIDE 2 MG/ML
0.25 INJECTION INTRAMUSCULAR; INTRAVENOUS; SUBCUTANEOUS
Refills: 0 | Status: DISCONTINUED | OUTPATIENT
Start: 2023-04-14 | End: 2023-04-17

## 2023-04-14 RX ORDER — APREPITANT 80 MG/1
40 CAPSULE ORAL ONCE
Refills: 0 | Status: COMPLETED | OUTPATIENT
Start: 2023-04-14 | End: 2023-04-14

## 2023-04-14 RX ORDER — ADALIMUMAB 40MG/0.8ML
40 KIT SUBCUTANEOUS
Refills: 0 | DISCHARGE

## 2023-04-14 RX ORDER — HYDROMORPHONE HYDROCHLORIDE 2 MG/ML
0.5 INJECTION INTRAMUSCULAR; INTRAVENOUS; SUBCUTANEOUS
Refills: 0 | Status: DISCONTINUED | OUTPATIENT
Start: 2023-04-14 | End: 2023-04-17

## 2023-04-14 RX ORDER — ACETAMINOPHEN 500 MG
1 TABLET ORAL
Qty: 20 | Refills: 0
Start: 2023-04-14

## 2023-04-14 RX ORDER — ONDANSETRON 8 MG/1
4 TABLET, FILM COATED ORAL ONCE
Refills: 0 | Status: DISCONTINUED | OUTPATIENT
Start: 2023-04-14 | End: 2023-04-17

## 2023-04-14 RX ORDER — CHOLECALCIFEROL (VITAMIN D3) 125 MCG
1 CAPSULE ORAL
Qty: 0 | Refills: 0 | DISCHARGE

## 2023-04-14 RX ORDER — METOPROLOL TARTRATE 50 MG
1 TABLET ORAL
Qty: 0 | Refills: 0 | DISCHARGE

## 2023-04-14 RX ORDER — ATORVASTATIN CALCIUM 80 MG/1
1 TABLET, FILM COATED ORAL
Qty: 0 | Refills: 0 | DISCHARGE

## 2023-04-14 RX ORDER — POTASSIUM CHLORIDE 20 MEQ
1 PACKET (EA) ORAL
Qty: 0 | Refills: 0 | DISCHARGE

## 2023-04-14 RX ORDER — CEFAZOLIN SODIUM 1 G
2000 VIAL (EA) INJECTION ONCE
Refills: 0 | Status: COMPLETED | OUTPATIENT
Start: 2023-04-14 | End: 2023-04-14

## 2023-04-14 RX ORDER — CHLORHEXIDINE GLUCONATE 213 G/1000ML
1 SOLUTION TOPICAL ONCE
Refills: 0 | Status: COMPLETED | OUTPATIENT
Start: 2023-04-14 | End: 2023-04-14

## 2023-04-14 RX ADMIN — SODIUM CHLORIDE 75 MILLILITER(S): 9 INJECTION, SOLUTION INTRAVENOUS at 13:31

## 2023-04-14 RX ADMIN — APREPITANT 40 MILLIGRAM(S): 80 CAPSULE ORAL at 10:40

## 2023-04-14 NOTE — ASU DISCHARGE PLAN (ADULT/PEDIATRIC) - CARE PROVIDER_API CALL
Elliott Limon)  Orthopaedic Surgery  [address]  [place] [address]  Phone: [phone]  Fax: [phone]  Follow Up Time:

## 2023-04-14 NOTE — ASU DISCHARGE PLAN (ADULT/PEDIATRIC) - DISCHARGE PLAN IS COMPLETE AND GIVEN TO PATIENT
Introduction Text (Please End With A Colon): The following procedure was deferred:
Detail Level: Detailed
: Yes

## 2023-04-14 NOTE — ASU PATIENT PROFILE, ADULT - FALL HARM RISK - HARM RISK INTERVENTIONS
Assistance with ambulation/Assistance OOB with selected safe patient handling equipment/Communicate Risk of Fall with Harm to all staff/Monitor gait and stability/Reinforce activity limits and safety measures with patient and family/Sit up slowly, dangle for a short time, stand at bedside before walking/Tailored Fall Risk Interventions/Use of alarms - bed, chair and/or voice tab/Visual Cue: Yellow wristband and red socks/Bed in lowest position, wheels locked, appropriate side rails in place/Call bell, personal items and telephone in reach/Instruct patient to call for assistance before getting out of bed or chair/Non-slip footwear when patient is out of bed/[place] to call system/Physically safe environment - no spills, clutter or unnecessary equipment/Purposeful Proactive Rounding/Room/bathroom lighting operational, light cord in reach Assistance with ambulation/Assistance OOB with selected safe patient handling equipment/Communicate Risk of Fall with Harm to all staff/Discuss with provider need for PT consult/Monitor gait and stability/Provide patient with walking aids - walker, cane, crutches/Reinforce activity limits and safety measures with patient and family/Sit up slowly, dangle for a short time, stand at bedside before walking/Tailored Fall Risk Interventions/Use of alarms - bed, chair and/or voice tab/Visual Cue: Yellow wristband and red socks/Bed in lowest position, wheels locked, appropriate side rails in place/Call bell, personal items and telephone in reach/Instruct patient to call for assistance before getting out of bed or chair/Non-slip footwear when patient is out of bed/[place] to call system/Physically safe environment - no spills, clutter or unnecessary equipment/Purposeful Proactive Rounding/Room/bathroom lighting operational, light cord in reach

## 2023-04-14 NOTE — ASU DISCHARGE PLAN (ADULT/PEDIATRIC) - NS MD DC FALL RISK RISK
For information on Fall & Injury Prevention, visit: [email or web address] OR  [email or web address] OR  https://www.cdc.gov/steadi/patient.html

## 2023-04-14 NOTE — ASU PATIENT PROFILE, ADULT - FALL HARM RISK - TYPE OF ASSESSMENT
Dr. Pulido prescribed medications for surgery on 10/14/22 .  Prescriptions printed and given to Dr Pulido    
Admission

## 2023-04-14 NOTE — ASU DISCHARGE PLAN (ADULT/PEDIATRIC) - ASU DC SPECIAL INSTRUCTIONSFT
Elevate  Left   arm in sling daily when up & walking.  Elevate the  hand/arm above heart level on pillow/blankets when lying down.  Pad the neck strap with athletic sock/collared shirt.  Apply ice packs to top of  Left  hand for 20 min on and off  Keep bandage clean, dry , & intact until seen in office  May open & close the fingers of the operated arm every hour for exercise.  Call the Dr.  for fever, severe pain, fall or hand injury.  Call for an appointment for office visit  in  10-14 days.

## 2023-04-14 NOTE — ASU DISCHARGE PLAN (ADULT/PEDIATRIC) - CLICK TO LAUNCH ORM
Pt is asking for his backpack and clothes.  Pt informed that all he came with via ambulance was a shirt and socks.  RN called the [employer] and talked to Jigar the night manager who told the RN that his backpack and other things may be picked up at the  and that he no longer has a room.     .

## 2023-04-14 NOTE — BRIEF OPERATIVE NOTE - NSICDXBRIEFPROCEDURE_GEN_ALL_CORE_FT
PROCEDURES:  Open reduction and internal fixation (ORIF) of fracture of olecranon process of left ulna 14-Apr-2023 12:40:07  Agnes Steinberg

## 2023-04-14 NOTE — ASU DISCHARGE PLAN (ADULT/PEDIATRIC) - PAIN MANAGEMENT
Prescriptions electronically submitted to pharmacy from [facility]  [id number]/Prescriptions electronically submitted to pharmacy from [facility]

## 2023-04-18 PROBLEM — Z87.19 PERSONAL HISTORY OF OTHER DISEASES OF THE DIGESTIVE SYSTEM: Chronic | Status: ACTIVE | Noted: 2023-04-11

## 2023-04-18 PROBLEM — D64.9 ANEMIA, UNSPECIFIED: Chronic | Status: ACTIVE | Noted: 2023-04-11

## 2023-04-18 PROBLEM — I10 ESSENTIAL (PRIMARY) HYPERTENSION: Chronic | Status: ACTIVE | Noted: 2023-04-11

## 2023-04-18 PROBLEM — K25.9 GASTRIC ULCER, UNSPECIFIED AS ACUTE OR CHRONIC, WITHOUT HEMORRHAGE OR PERFORATION: Chronic | Status: ACTIVE | Noted: 2023-04-11

## 2023-04-18 PROBLEM — C50.919 MALIGNANT NEOPLASM OF UNSPECIFIED SITE OF UNSPECIFIED FEMALE BREAST: Chronic | Status: ACTIVE | Noted: 2023-04-11

## 2023-04-18 PROBLEM — E78.5 HYPERLIPIDEMIA, UNSPECIFIED: Chronic | Status: ACTIVE | Noted: 2023-04-11

## 2023-04-18 PROBLEM — K21.9 GASTRO-ESOPHAGEAL REFLUX DISEASE WITHOUT ESOPHAGITIS: Chronic | Status: ACTIVE | Noted: 2023-04-11

## 2023-04-18 PROBLEM — Z87.898 PERSONAL HISTORY OF OTHER SPECIFIED CONDITIONS: Chronic | Status: ACTIVE | Noted: 2023-04-11

## 2023-04-27 ENCOUNTER — APPOINTMENT (OUTPATIENT)
Dept: ORTHOPEDIC SURGERY | Facility: CLINIC | Age: 78
End: 2023-04-27
Payer: MEDICARE

## 2023-04-27 DIAGNOSIS — S42.402A UNSPECIFIED FRACTURE OF LOWER END OF LEFT HUMERUS, INITIAL ENCOUNTER FOR CLOSED FRACTURE: ICD-10-CM

## 2023-04-27 PROCEDURE — 99024 POSTOP FOLLOW-UP VISIT: CPT

## 2023-04-27 PROCEDURE — 73080 X-RAY EXAM OF ELBOW: CPT | Mod: LT

## 2023-04-27 NOTE — ADDENDUM
[FreeTextEntry1] : I, Bee Oh wrote this note acting as a scribe for Dr. Elliott Limon on Apr 27, 2023.

## 2023-04-27 NOTE — HISTORY OF PRESENT ILLNESS
[de-identified] : s/p Open reduction internal fixation of left olecranon. [de-identified] : The patient is a 78 year female who returns for the 1st postoperative visit after undergoing Open reduction internal fixation of left olecranon at [facility]. The surgery was on 04/14/2023. The patient is recovering at home. She reports mild postoperative pain. \par She was restarted on the Humira therapy for rheumatoid arthritis.\par She is weight bearing on the left arm with a walker.\par \par She has resumed taking Humira as of yesterday. [de-identified] : Patient is WDWN, alert, and in no acute distress. Breathing is unlabored. He is grossly oriented to person, place, and time.\par \par She is accompanied by a friend today.\par \par Left Elbow:\par Incision is healing well. There are no signs of infection. Sutures were removed. Normal amount of postoperative edema and tenderness present.\par Elbow ROM: 50°-120° \par Shoulder ROM is full.\par Wrist flexion and extension are full.\par Pronation is full, with limited supination.\par Digital motion is limited due to edema. \par Sensation to the digits is intact along the median, ulnar and radial nerve distribution.  [de-identified] : AP, lateral and oblique views of the LEFT elbow were obtained today and revealed a comminuted olecranon fracture stabilized by Synthes proximal ulnar plate. The hardware position is unchanged.The proximal portion of the ulna is partially displaced . [de-identified] : At this time, the sutures were removed. She was instructed on local wound care and when to begin use of Vitamin E oil on the scar. I recommended range of motion exercises of the elbow both actively and passively as well as use of the upper extremity for all ADLs as tolerated. NSAIDs prn.\par Follow up in 4 weeks for repeat xrays.

## 2023-04-27 NOTE — END OF VISIT
[FreeTextEntry3] : All medical record entries made by the Scribe were at my,  Dr. Elliott Limon MD., direction and personally dictated by me on 04/27/2023. I have personally reviewed the chart and agree that the record accurately reflects my personal performance of the history, physical exam, assessment and plan.

## 2023-04-28 NOTE — PROGRESS NOTE ADULT - SUBJECTIVE AND OBJECTIVE BOX
General Surgery Progress Note    SUBJECTIVE:  The patient was seen and examined. No acute events overnight. C/o increased irritation at the L inguinal fold- relief with nystatin cream. Denies N/V.     OBJECTIVE:     ** VITAL SIGNS / I&O's **    Vital Signs Last 24 Hrs  T(C): 37.1 (09 Oct 2018 04:57), Max: 38.8 (08 Oct 2018 19:12)  T(F): 98.8 (09 Oct 2018 04:57), Max: 101.8 (08 Oct 2018 19:12)  HR: 101 (09 Oct 2018 04:57) (81 - 110)  BP: 135/81 (09 Oct 2018 04:57) (101/66 - 157/93)  BP(mean): --  RR: 19 (09 Oct 2018 04:57) (16 - 20)  SpO2: 97% (09 Oct 2018 04:57) (96% - 97%)        ** PHYSICAL EXAM **    -- CONSTITUTIONAL: Alert, NAD  -- PULMONARY: non-labored respirations  -- ABDOMEN: soft, non-distended, non-tender  -- EXT: Left groin and suprapubic area with extensive erythema, induration and warmth wrapping to left flank, no crepitus, no bullae, no fluctuance or drainage, minimally tender, no evidence of hernia  -- NEURO: A&Ox3    ** LABS **                          11.7   16.35 )-----------( 192      ( 08 Oct 2018 08:03 )             36.3     08 Oct 2018 06:40    131    |  97     |  17     ----------------------------<  114    4.2     |  27     |  0.85     Ca    7.9        08 Oct 2018 06:40  Mg     1.9       08 Oct 2018 06:40    TPro  7.8    /  Alb  3.4    /  TBili  1.7    /  DBili  x      /  AST  47     /  ALT  55     /  AlkPhos  72     07 Oct 2018 14:09    PT/INR - ( 07 Oct 2018 18:39 )   PT: 13.3 sec;   INR: 1.23 ratio         PTT - ( 07 Oct 2018 18:39 )  PTT:28.7 sec  CAPILLARY BLOOD GLUCOSE            LIVER FUNCTIONS - ( 07 Oct 2018 14:09 )  Alb: 3.4 g/dL / Pro: 7.8 g/dL / ALK PHOS: 72 U/L / ALT: 55 U/L / AST: 47 U/L / GGT: x             Culture - Blood (collected 07 Oct 2018 15:19)  Source: .Blood Blood-Venous  Preliminary Report (08 Oct 2018 16:01):    No growth to date.    Culture - Blood (collected 07 Oct 2018 15:19)  Source: .Blood Blood-Venous  Preliminary Report (08 Oct 2018 16:01):    No growth to date.          MEDICATIONS  (STANDING):  aspirin enteric coated 81 milliGRAM(s) Oral daily  atorvastatin 10 milliGRAM(s) Oral at bedtime  calcium carbonate    500 mG (Tums) Chewable 1 Tablet(s) Chew two times a day  influenza   Vaccine 0.5 milliLiter(s) IntraMuscular once  mesalamine ER (24-Hour) Capsule 1.5 Gram(s) Oral daily  metoprolol succinate ER 25 milliGRAM(s) Oral daily  piperacillin/tazobactam IVPB. 3.375 Gram(s) IV Intermittent every 8 hours  potassium chloride    Tablet ER 10 milliEquivalent(s) Oral daily  predniSONE   Tablet 10 milliGRAM(s) Oral daily    MEDICATIONS  (PRN):  acetaminophen   Tablet .. 650 milliGRAM(s) Oral every 6 hours PRN Temp greater or equal to 38C (100.4F), Mild Pain (1 - 3)  nystatin Cream 1 Application(s) Topical two times a day PRN rash/irritation  oxyCODONE    5 mG/acetaminophen 325 mG 1 Tablet(s) Oral every 8 hours PRN Moderate Pain (4 - 6)  traMADol 50 milliGRAM(s) Oral two times a day PRN Moderate Pain (4 - 6) Spontaneous, unlabored and symmetrical

## 2023-05-04 ENCOUNTER — INPATIENT (INPATIENT)
Facility: HOSPITAL | Age: 78
LOS: 2 days | Discharge: NURSING, REHAB OR LONG-TERM CARE | DRG: 543 | End: 2023-05-07
Attending: INTERNAL MEDICINE | Admitting: INTERNAL MEDICINE
Payer: MEDICARE

## 2023-05-04 VITALS
WEIGHT: 175.05 LBS | SYSTOLIC BLOOD PRESSURE: 200 MMHG | TEMPERATURE: 98 F | RESPIRATION RATE: 14 BRPM | HEIGHT: 62 IN | DIASTOLIC BLOOD PRESSURE: 89 MMHG | OXYGEN SATURATION: 99 % | HEART RATE: 80 BPM

## 2023-05-04 DIAGNOSIS — Z90.710 ACQUIRED ABSENCE OF BOTH CERVIX AND UTERUS: Chronic | ICD-10-CM

## 2023-05-04 DIAGNOSIS — Z98.890 OTHER SPECIFIED POSTPROCEDURAL STATES: Chronic | ICD-10-CM

## 2023-05-04 DIAGNOSIS — Z90.49 ACQUIRED ABSENCE OF OTHER SPECIFIED PARTS OF DIGESTIVE TRACT: Chronic | ICD-10-CM

## 2023-05-04 DIAGNOSIS — Z98.49 CATARACT EXTRACTION STATUS, UNSPECIFIED EYE: Chronic | ICD-10-CM

## 2023-05-04 DIAGNOSIS — Z90.721 ACQUIRED ABSENCE OF OVARIES, UNILATERAL: Chronic | ICD-10-CM
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
## 2023-05-04 DIAGNOSIS — Z98.1 ARTHRODESIS STATUS: Chronic | ICD-10-CM

## 2023-05-04 LAB
ALBUMIN SERPL ELPH-MCNC: 3.4 G/DL — SIGNIFICANT CHANGE UP (ref 3.3–5)
ALP SERPL-CCNC: 135 U/L — HIGH (ref 30–120)
ALT FLD-CCNC: 17 U/L DA — SIGNIFICANT CHANGE UP (ref 10–60)
ANION GAP SERPL CALC-SCNC: 12 MMOL/L — SIGNIFICANT CHANGE UP (ref 5–17)
APPEARANCE UR: CLEAR — SIGNIFICANT CHANGE UP
APTT BLD: 30.4 SEC — SIGNIFICANT CHANGE UP (ref 27.5–35.5)
AST SERPL-CCNC: 18 U/L — SIGNIFICANT CHANGE UP (ref 10–40)
BASOPHILS # BLD AUTO: 0.02 K/UL — SIGNIFICANT CHANGE UP (ref 0–0.2)
BASOPHILS NFR BLD AUTO: 0.4 % — SIGNIFICANT CHANGE UP (ref 0–2)
BILIRUB SERPL-MCNC: 0.9 MG/DL — SIGNIFICANT CHANGE UP (ref 0.2–1.2)
BILIRUB UR-MCNC: NEGATIVE — SIGNIFICANT CHANGE UP
BUN SERPL-MCNC: 10 MG/DL — SIGNIFICANT CHANGE UP (ref 7–23)
CALCIUM SERPL-MCNC: 9.4 MG/DL — SIGNIFICANT CHANGE UP (ref 8.4–10.5)
CHLORIDE SERPL-SCNC: 97 MMOL/L — SIGNIFICANT CHANGE UP (ref 96–108)
CO2 SERPL-SCNC: 26 MMOL/L — SIGNIFICANT CHANGE UP (ref 22–31)
COLOR SPEC: YELLOW — SIGNIFICANT CHANGE UP
CREAT SERPL-MCNC: 0.64 MG/DL — SIGNIFICANT CHANGE UP (ref 0.5–1.3)
DIFF PNL FLD: ABNORMAL
EGFR: 89 ML/MIN/1.73M2 — SIGNIFICANT CHANGE UP
EOSINOPHIL # BLD AUTO: 0.03 K/UL — SIGNIFICANT CHANGE UP (ref 0–0.5)
EOSINOPHIL NFR BLD AUTO: 0.6 % — SIGNIFICANT CHANGE UP (ref 0–6)
GLUCOSE SERPL-MCNC: 107 MG/DL — HIGH (ref 70–99)
GLUCOSE UR QL: NEGATIVE MG/DL — SIGNIFICANT CHANGE UP
HCT VFR BLD CALC: 35.5 % — SIGNIFICANT CHANGE UP (ref 34.5–45)
HGB BLD-MCNC: 11.3 G/DL — LOW (ref 11.5–15.5)
IMM GRANULOCYTES NFR BLD AUTO: 0.6 % — SIGNIFICANT CHANGE UP (ref 0–0.9)
INR BLD: 1.11 RATIO — SIGNIFICANT CHANGE UP (ref 0.88–1.16)
KETONES UR-MCNC: 15 MG/DL
LEUKOCYTE ESTERASE UR-ACNC: NEGATIVE — SIGNIFICANT CHANGE UP
LYMPHOCYTES # BLD AUTO: 0.5 K/UL — LOW (ref 1–3.3)
LYMPHOCYTES # BLD AUTO: 9.5 % — LOW (ref 13–44)
MCHC RBC-ENTMCNC: 26.8 PG — LOW (ref 27–34)
MCHC RBC-ENTMCNC: 31.8 GM/DL — LOW (ref 32–36)
MCV RBC AUTO: 84.1 FL — SIGNIFICANT CHANGE UP (ref 80–100)
MONOCYTES # BLD AUTO: 0.36 K/UL — SIGNIFICANT CHANGE UP (ref 0–0.9)
MONOCYTES NFR BLD AUTO: 6.8 % — SIGNIFICANT CHANGE UP (ref 2–14)
NEUTROPHILS # BLD AUTO: 4.35 K/UL — SIGNIFICANT CHANGE UP (ref 1.8–7.4)
NEUTROPHILS NFR BLD AUTO: 82.1 % — HIGH (ref 43–77)
NITRITE UR-MCNC: NEGATIVE — SIGNIFICANT CHANGE UP
NRBC # BLD: 0 /100 WBCS — SIGNIFICANT CHANGE UP (ref 0–0)
PH UR: 7.5 — SIGNIFICANT CHANGE UP (ref 5–8)
PLATELET # BLD AUTO: 221 K/UL — SIGNIFICANT CHANGE UP (ref 150–400)
POTASSIUM SERPL-MCNC: 3.2 MMOL/L — LOW (ref 3.5–5.3)
POTASSIUM SERPL-SCNC: 3.2 MMOL/L — LOW (ref 3.5–5.3)
PROT SERPL-MCNC: 7.4 G/DL — SIGNIFICANT CHANGE UP (ref 6–8.3)
PROT UR-MCNC: 30 MG/DL
PROTHROM AB SERPL-ACNC: 13.1 SEC — SIGNIFICANT CHANGE UP (ref 10.5–13.4)
RBC # BLD: 4.22 M/UL — SIGNIFICANT CHANGE UP (ref 3.8–5.2)
RBC # FLD: 14.2 % — SIGNIFICANT CHANGE UP (ref 10.3–14.5)
RBC CASTS # UR COMP ASSIST: 1 /HPF — SIGNIFICANT CHANGE UP (ref 0–4)
SODIUM SERPL-SCNC: 135 MMOL/L — SIGNIFICANT CHANGE UP (ref 135–145)
SP GR SPEC: 1.01 — SIGNIFICANT CHANGE UP (ref 1–1.03)
UROBILINOGEN FLD QL: 1 MG/DL — SIGNIFICANT CHANGE UP (ref 0.2–1)
WBC # BLD: 5.29 K/UL — SIGNIFICANT CHANGE UP (ref 3.8–10.5)
WBC # FLD AUTO: 5.29 K/UL — SIGNIFICANT CHANGE UP (ref 3.8–10.5)
WBC UR QL: 0 /HPF — SIGNIFICANT CHANGE UP (ref 0–5)

## 2023-05-04 PROCEDURE — 99223 1ST HOSP IP/OBS HIGH 75: CPT | Mod: AI

## 2023-05-04 PROCEDURE — 72192 CT PELVIS W/O DYE: CPT | Mod: 26,MA

## 2023-05-04 PROCEDURE — 72131 CT LUMBAR SPINE W/O DYE: CPT | Mod: 26,MA

## 2023-05-04 PROCEDURE — 99285 EMERGENCY DEPT VISIT HI MDM: CPT

## 2023-05-04 PROCEDURE — 71045 X-RAY EXAM CHEST 1 VIEW: CPT | Mod: 26

## 2023-05-04 PROCEDURE — 93010 ELECTROCARDIOGRAM REPORT: CPT

## 2023-05-04 RX ORDER — OXYCODONE AND ACETAMINOPHEN 5; 325 MG/1; MG/1
1 TABLET ORAL ONCE
Refills: 0 | Status: DISCONTINUED | OUTPATIENT
Start: 2023-05-04 | End: 2023-05-04

## 2023-05-04 RX ORDER — POTASSIUM CHLORIDE 20 MEQ
10 PACKET (EA) ORAL DAILY
Refills: 0 | Status: DISCONTINUED | OUTPATIENT
Start: 2023-05-04 | End: 2023-05-07

## 2023-05-04 RX ORDER — LIDOCAINE 4 G/100G
1 CREAM TOPICAL
Refills: 0 | Status: DISCONTINUED | OUTPATIENT
Start: 2023-05-04 | End: 2023-05-07

## 2023-05-04 RX ORDER — ATORVASTATIN CALCIUM 80 MG/1
10 TABLET, FILM COATED ORAL AT BEDTIME
Refills: 0 | Status: DISCONTINUED | OUTPATIENT
Start: 2023-05-04 | End: 2023-05-07

## 2023-05-04 RX ORDER — ONDANSETRON 8 MG/1
4 TABLET, FILM COATED ORAL ONCE
Refills: 0 | Status: COMPLETED | OUTPATIENT
Start: 2023-05-04 | End: 2023-05-04

## 2023-05-04 RX ORDER — POLYETHYLENE GLYCOL 3350 17 G/17G
17 POWDER, FOR SOLUTION ORAL DAILY
Refills: 0 | Status: DISCONTINUED | OUTPATIENT
Start: 2023-05-04 | End: 2023-05-07

## 2023-05-04 RX ORDER — METOPROLOL TARTRATE 50 MG
25 TABLET ORAL DAILY
Refills: 0 | Status: DISCONTINUED | OUTPATIENT
Start: 2023-05-04 | End: 2023-05-07

## 2023-05-04 RX ORDER — TRAMADOL HYDROCHLORIDE 50 MG/1
50 TABLET ORAL EVERY 4 HOURS
Refills: 0 | Status: DISCONTINUED | OUTPATIENT
Start: 2023-05-04 | End: 2023-05-07

## 2023-05-04 RX ORDER — FOLIC ACID 0.8 MG
1 TABLET ORAL DAILY
Refills: 0 | Status: DISCONTINUED | OUTPATIENT
Start: 2023-05-04 | End: 2023-05-07

## 2023-05-04 RX ORDER — CITALOPRAM 10 MG/1
10 TABLET, FILM COATED ORAL DAILY
Refills: 0 | Status: DISCONTINUED | OUTPATIENT
Start: 2023-05-04 | End: 2023-05-07

## 2023-05-04 RX ORDER — PANTOPRAZOLE SODIUM 20 MG/1
40 TABLET, DELAYED RELEASE ORAL
Refills: 0 | Status: DISCONTINUED | OUTPATIENT
Start: 2023-05-04 | End: 2023-05-07

## 2023-05-04 RX ORDER — ACETAMINOPHEN 500 MG
1000 TABLET ORAL EVERY 8 HOURS
Refills: 0 | Status: DISCONTINUED | OUTPATIENT
Start: 2023-05-04 | End: 2023-05-07

## 2023-05-04 RX ORDER — POTASSIUM CHLORIDE 20 MEQ
40 PACKET (EA) ORAL ONCE
Refills: 0 | Status: COMPLETED | OUTPATIENT
Start: 2023-05-04 | End: 2023-05-04

## 2023-05-04 RX ORDER — CELECOXIB 200 MG/1
100 CAPSULE ORAL EVERY 12 HOURS
Refills: 0 | Status: DISCONTINUED | OUTPATIENT
Start: 2023-05-04 | End: 2023-05-07

## 2023-05-04 RX ORDER — IBUPROFEN 600 MG/1
600 TABLET, FILM COATED ORAL
Qty: 60 | Refills: 0 | Status: ACTIVE | COMMUNITY
Start: 2023-05-04 | End: 1900-01-01

## 2023-05-04 RX ORDER — CHOLECALCIFEROL (VITAMIN D3) 125 MCG
1000 CAPSULE ORAL DAILY
Refills: 0 | Status: DISCONTINUED | OUTPATIENT
Start: 2023-05-04 | End: 2023-05-07

## 2023-05-04 RX ORDER — ENOXAPARIN SODIUM 100 MG/ML
40 INJECTION SUBCUTANEOUS EVERY 24 HOURS
Refills: 0 | Status: DISCONTINUED | OUTPATIENT
Start: 2023-05-04 | End: 2023-05-07

## 2023-05-04 RX ADMIN — CELECOXIB 100 MILLIGRAM(S): 200 CAPSULE ORAL at 20:02

## 2023-05-04 RX ADMIN — ATORVASTATIN CALCIUM 10 MILLIGRAM(S): 80 TABLET, FILM COATED ORAL at 21:55

## 2023-05-04 RX ADMIN — ENOXAPARIN SODIUM 40 MILLIGRAM(S): 100 INJECTION SUBCUTANEOUS at 19:17

## 2023-05-04 RX ADMIN — Medication 1000 MILLIGRAM(S): at 21:56

## 2023-05-04 RX ADMIN — Medication 1000 MILLIGRAM(S): at 22:30

## 2023-05-04 RX ADMIN — OXYCODONE AND ACETAMINOPHEN 1 TABLET(S): 5; 325 TABLET ORAL at 13:29

## 2023-05-04 RX ADMIN — Medication 40 MILLIEQUIVALENT(S): at 19:13

## 2023-05-04 RX ADMIN — ONDANSETRON 4 MILLIGRAM(S): 8 TABLET, FILM COATED ORAL at 13:29

## 2023-05-04 RX ADMIN — CELECOXIB 100 MILLIGRAM(S): 200 CAPSULE ORAL at 20:42

## 2023-05-04 NOTE — H&P ADULT - NSICDXPASTMEDICALHX_GEN_ALL_CORE_FT
PAST MEDICAL HISTORY:  Breast cancer     Gastric ulcer     GERD (gastroesophageal reflux disease)     H/O ulcerative colitis     H/O vertigo     History of diverticulitis     Hyperlipemia     Hypertension     Mild anemia

## 2023-05-04 NOTE — PATIENT PROFILE ADULT - FUNCTIONAL ASSESSMENT - DAILY ACTIVITY 6.
Oxycodone prescription signed.    Agree with 12 heart rate hold of lovenox before procedure.    Jemma Alvarado MD  Internal Medicine & Pediatrics  [facility]  She/her     4 = No assist / stand by assistance

## 2023-05-04 NOTE — H&P ADULT - NSHPLABSRESULTS_GEN_ALL_CORE
Labs:                        11.3   5.29  )-----------( 221      ( 04 May 2023 16:00 )             35.5           135  |  97  |  10  ----------------------------<  107<H>  3.2<L>   |  26  |  0.64    Ca    9.4      04 May 2023 16:00    TPro  7.4  /  Alb  3.4  /  TBili  0.9  /  DBili  x   /  AST  18  /  ALT  17  /  AlkPhos  135<H>            Urinalysis Basic - ( 04 May 2023 16:57 )  Color: Yellow / Appearance: Clear / S.010 / pH: x  Gluc: x / Ketone: 15 mg/dL  / Bili: Negative / Urobili: 1.0 mg/dL   Blood: x / Protein: 30 mg/dL / Nitrite: Negative   Leuk Esterase: Negative / RBC: 1 /HPF / WBC 0 /HPF   Sq Epi: x / Non Sq Epi: x / Bacteria: x      PT/INR - ( 04 May 2023 16:00 )   PT: 13.1 sec;   INR: 1.11 ratio    PTT - ( 04 May 2023 16:00 )  PTT:30.4 sec    Lactate Trend      CAPILLARY BLOOD GLUCOSE      EKG:  NSR  Personally Reviewed:  [x ] YES     Imaging:   < from: CT Lumbar Spine No Cont (23 @ 14:09) >    IMPRESSION:  Redemonstration of L4 total laminectomy and posterior fusion of L4 and L5.  Hardware is well-placed. No evidence for hardware fracture or loosening.  No acute fracture or traumatic subluxation.  Mild compression deformity of the superior endplate of L2 is associated with the presence of a Schmorl's node.  Degenerative changes.  Evaluation of the spinal canal contents is limited by CT modality and streak artifact from spinal hardware.  Suspected moderate spinal canal stenosis at L2-L3 and L3-L4.  Multilevel neural foraminal narrowing, mild to moderate in degree.  < end of copied text >    < from: Xray Chest 1 View-PORTABLE IMMEDIATE (Xray Chest 1 View-PORTABLE IMMEDIATE .) (23 @ 15:56) >    IMPRESSION:  Clear lungs with no acute cardiopulmonary abnormalities.    < end of copied text >      Personally Reviewed:  [ x] YES

## 2023-05-04 NOTE — H&P ADULT - HISTORY OF PRESENT ILLNESS
78y F with PMHx HTN, HLD, GERD, Breast CA s/p Left lumpectomy (7years ago), Ulcerative Colitis, s/p Left Elbow ORIF(4/14/23), presents from home with c/o severe back pain originating on the left lower flank and radiating throughout the entire lower back. She said the pain started a few days ago but became unbearable since yesterday. She has been unable to sleep in bed and has been sleeping on the couch for 4x days and this morning was unable to stand. Pain is relieved when the patient is lying still or sitting up, and worse with movement and standing position. She attempted Tylenol, Bengay and a 1x dose of Aleve for pain with no relief. Pt reports that she is unable to stand even now when she needed to use the bathroom. She denies N/V/D/C, any change in bladder or bowel habiits, recent sick contact and recent travel.

## 2023-05-04 NOTE — ED PROVIDER NOTE - OBJECTIVE STATEMENT
78-year-old female with history of ulcerative colitis hypertension chronic back pain recent fall with elbow fracture status post ORIF left elbow.  Has had problem with opioid abuse.  Brought by son for evaluation of left lower back pain radiating into buttock for the past 4 weeks.  Took Tylenol at home without improvement.  Denies new injury.  Son states patient had a superior ramus pelvis fracture 1 month ago from same fall.  Denies headache neck pain chest pain shortness of breath cough vomiting diarrhea dysuria hematuria or other symptom.

## 2023-05-04 NOTE — PATIENT PROFILE ADULT - IS PATIENT POST-MENOPAUSAL?
Pulmonary Medicine  Cystic Fibrosis - Lung Transplant Daily Progress Note   November 24, 2017       Patient: Tamar Jhaveri  MRN: [id number]  Transplant Date: 6/25/2008 (POD# 3439)  Admission date: 11/17/2017  Hospital Day #7          Assessment and Plan:     Tamar Jhaveri is a 72 year old female with a history of COPD s/p L lung transplant in 2008 c/b CLAD, EBV viremia/ PTLD, CKD, and hypothyroidism. Recent hospitalization 10/22-10/31 for LLL PNA treated with IV antibiotics (completed course on 10/29). The patient was admitted on 11/17/2017 following syncopal event and subsequent fall at her nursing home. Concern for PNA based on imaging. Also with recent EKG changes c/f ischemia. Some improvement overall since admission, no longer requiring BIPAP. O2 needs still above baseline, requiring 2-4 L O2 via oxymizer at rest, up to 8 L with activity and during sleep.      Today's Changes:  - 500 ml LR bolus x 1 given soft BP  - Finish Zosyn today for a 7 day course.   - Continue current IST. Repeat tacrolimus level today- will follow.       Syncopal episode and subsequent fall:  Orthostatic hypotension: S/p fall on 11/17 PTA, at nursing home. Reportedly pt was found on the bathroom floor, unsure as to how long she was down. Pt remembers becoming SOB and lightheaded, but does not remember anything after fall. States she does not think she hit her head, but maybe her R arm. CT head negative for acute intracranial pathology. R arm with full ROM, denies pain. Unsure if syncopal event was precipitated by cardiac vs hypoxic event. Of note, positive troponin on admission 0.181-->0.218-->0.192. EKG with subtle ST/T wave changes initially, then on 11/20 c/f evolving ischemia. Cardiology consulted. Dobutamine stress echocardiogram 11/21 negative for inducible ischemia; positive mild sclerosis of aortic valve. Positive intermittent orthostatic hypotension. BP soft today, 80s/60s.   - Given CKD, ASA held.   - Orthostatic  BP daily  - Blood culture 11/17 NGTD, will follow.   - Thigh high CHIARA hose ordered  - Will defer recent plans (f/u with Dr. Kennedy, tilt table test, Ziopatch) for cardiology f/u per Dr. Genao given plans for pt and family to sign POLST upon discharge.   - Given soft BP, ordered 500 ml LR bolus x 1      Concern for HAP vs pulmonary edema:  Acute on chronic hypoxic/ hypercapneic respiratory failure: Hx of growing E coli (S to zosyn). Recently completed treatment with IV antibiotics (10/29) and steroid support for LLL PNA during previous hospitalization. Hx of growing E coli on sputum. New baseline O2 requirements of 4-5 L PTA. Ongoing differentials of acute decline include CHF, cardiac event, recurrent HAP, worsening CLAD, or acute rejection. Mildly positive procalcitonin, 0.19 on admission; < 0.05 on recheck (11/22). Of note, pt had an elevated BNP on admission--> 9333 although reliability questioned given hx of CKD; however, BNP was ~3K in September 2017. Echocardiogram in ED showed normal LV and RV function; preserved respiratory variability, suggestive of normovolemia. CT chest 11/18 with mild peripheral interstitial prominence and minimal scattered GGO, slightly increased since prior exam. Mild increase of small L sided pleural effusion. Had been mostly on exclusive BIPAP over the weekend, with intermittent breaks for meals. Was found obtunded on 11/19 with ABG 7.31/83/66/42 on 50% FiO2. Pt had improvement of symptoms shortly after being placed on BIPAP. Has been off BIPAP since 11/21AM. Slowly weaning O2, now oxygenating well on 2-4 L oxymizer at rest, requiring up to 8 L with activity. Yesterday, was wearing 5-6 L oxymizer at rest with 11 L during activity. Feel ongoing O2 requirements likely related to volume, rather than infection.   - Daily diuresis since 11/22 in an effort to reduce O2 requirements. Some concern hypoxia could be related to volume. Will defer diuresis today given soft BP [as above].  Reassess need/ tolerance daily.   - Ordered sputum culture and fungal culture on admission. Unable to be collected, even after induction by RT. Discussed with nursing, collect once able.   - IV zosyn (11/17-11/24) for a 7 day course. IV vancomycin discontinued (11/17-11/20).  - No intervention for L sided effusion necessary at this time per Dr. Genao.   - Check DSA 11/18, pending results.       S/p L lung transplant 2/2 COPD in 2008:  CLAD: C/b CLAD, EBV viremia. Last DSA negative 07/2017. Over the past year, baseline FEV1 down 50% from baseline. Most recently on 11/9- FEV1 15%, down from recent baseline of ~ 20%.  - Continue CLAD meds: azithromycin MWF, singulair QHS. Advair BID (started 11/20).   - Continue atrovent nasal spray QID  - Duonebs PRN      Continue 2 drug immunosuppression:  - Tacrolimus 1.5 mg BID, decreased 11/21 d/t supra-therapeutic level of 11.4 (13.5 h level). Goal 8-10. Next level scheduled for 11/24 (ordered).  - Imuran discontinued several months ago given hx of EBV viremia  - Prednisone 10 mg daily      Ppx:  - Bactrim MWF for PJP  - PPI daily for GI ppx      Hx of EBV viremia: Hx of PTLD, which was treated with 4 cycles of rituximab in the fall of 2016. Most recent EBV PCR was negative on 11/9.  - Will need to f/u with Heme/Onc as OP.      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 Creatinine 1.33 on admission, above baseline of ~0.8-1.1. Now up trending to 1.56 today, suspect related to recent diuresis. Suspect mild hypovolemia AEB dry mucous membranes, poor skin turgor, weight down trending, and soft BP.  - BMP daily  - IVF [as above]      HTN: BP soft today, previously well controlled on current regimen.   - Continue PTA metoprolol.   - Continue amlodipine to 7.5 mg QHS. If hypotension persists, may need to consider decreasing dose.       Severe malnutrition in the context of chronic  illness:  Anorexia:  Weight loss: </= 50% intake for > 1 month (severe). Poor appetite, reports eating only one meal per day. It appears the pt has had at least a 5 pound weight loss over the past month. Severe SQ fat loss to face, upper arm, lower arm, thoracic/ intercostal region. Severe muscle loss to temporal region, face & jaw, scapular bone, thoracic region. Appetite poor, but waxes and wanes according to pt's family.   - Consult nutrition, appreciate recommendations.  - Regular diet.   - Continue PTA marinol.   - Pt adamant that she DOES NOT want to receive enteral feedings.       Hx of AMS, delirium: Hx of AMS with confusion, seen by neuro-psych as OP. Suspected to be 2/2 acquired brain dysfunction vs mild encephalopathy related to multiple health factors including lung disease/ hypoxia, which is compounded by her depression. Pt alert, oriented on exam. Forgetful at times.  - Follow clinically. Palliative consulted, as above.      Anxiety: Symptoms well controlled  - Continue PTA klonopin TID PRN      Hypothyroidism: Symptoms controlled.  - Continue PTA synthroid.        Hypomagnesemia:  Hypophosphatemia: On oral replacement as OP.   - Check mag/ phos levels QOD.   - Continue PTA PO Mag/ phos. PRN replacement ordered.        Advanced Care Planning: Pt with numerous recent hospitalizations. Now with hypercapneic respiratory failure, worrisome in the setting of CLAD. Progressive O2 requirements over the past few months.   - Care conference held 11/22 with both pulmonary and palliative care teams. Plan is to discharge to long term care facility once able (anticipate need to further wean O2). Pt's family plans to sign a POLST in preparation for discharge.  - Appreciate ongoing palliative recommendations.   - Will continue medical therapies, but with an ultimate goal for comfort.   - Plan for hospice referral  - DNR/DNI. Pt expressed that she DOES NOT want to go on BIPAP if indicated based on clinical  presentation.     FEN: Regular  Lines: PIV  Ppx: SCD's, PPI daily, hep SQ  Dispo: Potential discharge to long term care facility towards the beginning of next week. First, need to wean O2 baseline O2 needs.       Patient discussed with Dr. Genao.    JAN Morgan, CNP  Inpatient Nurse Practitioner  [facility]  Pager [phone]    ADDENDUM:  Pt's BP improved this afternoon-->120/80's. Held off on LR bolus in an attempt to minimize fluid given pulmonary symptoms. Will consider bolus if hypotension recurs.     Tacrolimus level sub-therapeutic today, 7.4. Goal 8-10. Will increase dose to 2 qAM, 1.5 qPM. Next level on 11/27 (ordered)  - MW, CNP        Subjective & Interval History:     Last 24 hours of care team notes reviewed.    O2 was weaned to 4L (via oxymizer), still using up to 8 L during activity. O2 requirements slowly improving with recent diuresis. No reported desaturation events overnight. Hemodynamically stable, but BP soft this morning (80's/60's). Afebrile. Denies SOB. Mentation at baseline, alert. Denies dizziness or lightheadedness.            Review of Systems:     C: no fever, no chills, + change in weight (+ weight loss), no change in appetite  INTEGUMENTARY/SKIN: no rash or obvious new lesions  ENT/MOUTH: no sore throat, no sinus pain, no nasal drainage  RESP: see interval history  CV: no chest pain, no palpitations, no peripheral edema, no orthopnea  GI: no nausea, no vomiting, + loose stools, no reflux symptoms  : no dysuria  MUSCULOSKELETAL: no myalgias, no arthralgias  ENDOCRINE: blood sugars with adequate control  NEURO: no headache, no numbness or tingling  PSYCHIATRIC: mood stable          Medications:     Active Medications:    metoprolol  12.5 mg Oral BID     tacrolimus  1.5 mg Oral BID IS     fluticasone-salmeterol  1 puff Inhalation Q12H     amLODIPine  7.5 mg Oral At Bedtime     sodium chloride (PF)  3 mL Intracatheter Q8H     azithromycin  250 mg Oral Q Mon Wed Fri AM      calcium carbonate  1,250 mg Oral Daily     cholecalciferol  1,000 Units Oral Daily     dronabinol  5 mg Oral BID     ipratropium  1 spray Both Nostrils 4x Daily     levothyroxine  75 mcg Oral Daily     magnesium oxide  400 mg Oral Daily     montelukast  10 mg Oral At Bedtime     multivitamin, therapeutic with minerals  1 tablet Oral Daily     phosphorus tablet 250 mg  250 mg Oral Daily     sulfamethoxazole-trimethoprim  1 tablet Oral Q Mon Wed Fri AM     predniSONE  10 mg Oral Daily     pantoprazole (PROTONIX) EC tablet 40 mg  40 mg Oral QAM AC     heparin  5,000 Units Subcutaneous Q12H     piperacillin-tazobactam  3.375 g Intravenous Q6H     fish oil-omega-3 fatty acids  1 g Oral Daily     Active PRN Medications:  potassium chloride, potassium chloride, potassium chloride, potassium chloride with lidocaine, potassium chloride, polyethylene glycol, atropine, metoprolol, potassium phosphate (KPHOS) in D5W IV, potassium phosphate (KPHOS) in D5W IV, potassium phosphate (KPHOS) in D5W IV, potassium phosphate (KPHOS) in D5W IV, - MEDICATION INSTRUCTIONS -, - MEDICATION INSTRUCTIONS -, lidocaine (buffered or not buffered), lidocaine 4%, sodium chloride (PF), albuterol, loperamide, clonazePAM, ipratropium - albuterol 0.5 mg/2.5 mg/3 mL, naloxone, acetaminophen, hypromellose-dextran         Physical Exam:     Constitutional: Awake, alert, in no apparent distress.   HEENT: Eyes with pink conjunctivae, no scleral jaundice.  Oral mucosa moist, without lesions.   PULM: Diminished air flow bilaterally. Fine crackles to LLL. No rhonchi, no wheeze. Breathing non-labored.   CV: Normal S1 and S2. RRR.  No murmur, gallop, or rub.  No peripheral edema.  Peripheral pulses intact.   ABD: NABS, soft, nontender, nondistended.  No guarding.   MSK: Moves all extremities. + apparent muscle wasting.   NEURO: Alert and oriented x 4, conversant.   SKIN: Warm, dry. No pruritus. No rash on limited exam. Scattered ecchymosis. Poor skin turgor.  "  PSYCH: Mood stable, judgment and insight appropriate. Forgetful at times.      Lines, Drains, and Devices:  Peripheral IV 11/22/17 Left;Posterior Lower forearm (Active)   Site Assessment WDL 11/23/2017  4:00 PM   Line Status Saline locked 11/23/2017  4:00 PM   Phlebitis Scale 0-->no symptoms 11/23/2017  4:00 PM   Infiltration Scale 0 11/22/2017  8:00 PM   Dressing Intervention New dressing  11/22/2017 10:00 AM   Number of days:2          Data:     All vital signs, laboratory and imaging data for the past 24 hours reviewed.      Vital signs:  Temp: 98.2  F (36.8  C) Temp src: Oral BP: (!) 89/72 Pulse: 93 Heart Rate: 98 Resp: 16 SpO2: 97 % O2 Device: None (Room air) Oxygen Delivery: 4 LPM Height: 160 cm (5' 3\") Weight: 41.5 kg (91 lb 9.6 oz)    Weight trend:   Vitals:    11/21/17 0644 11/22/17 0508 11/24/17 0010   Weight: 44.3 kg (97 lb 11.2 oz) 44.8 kg (98 lb 12.8 oz) 41.5 kg (91 lb 9.6 oz)        I/O:   Intake/Output Summary (Last 24 hours) at 11/24/17 1055  Last data filed at 11/24/17 0900   Gross per 24 hour   Intake              240 ml   Output             1700 ml   Net            -1460 ml       Labs    CMP:   Recent Labs  Lab 11/24/17  0633 11/23/17  0730 11/22/17  1950 11/22/17  1721 11/22/17  0648 11/21/17  0647  11/17/17  1105   * 144  --   --  146* 146*  < > 146*   POTASSIUM 4.4 4.3 4.7 5.4* 2.9* 3.8  < > 3.9   CHLORIDE 98 101  --   --  104 103  < > 105   CO2 45* 39*  --   --  37* 38*  < > 34*   ANIONGAP 3 4  --   --  5 5  < > 6   GLC 81 86  --   --  131* 75  < > 84   BUN 13 13  --   --  16 19  < > 22   CR 1.56* 1.32*  --   --  1.31* 1.47*  < > 1.33*   GFRESTIMATED 32* 39*  --   --  40* 35*  < > 39*   GFRESTBLACK 39* 47*  --   --  48* 42*  < > 47*   JUMANA 9.2 8.8  --   --  8.7 8.8  < > 8.9   MAG 1.7 1.8  --   --  2.1 2.2  < > 1.8   PHOS 2.9 2.8 3.5  --  1.8* 2.9  < > 3.9   PROTTOTAL  --   --   --   --   --   --   --  5.8*   ALBUMIN  --   --   --   --   --   --   --  2.8*   BILITOTAL  --   --   --   --  "  --   --   --  0.2   ALKPHOS  --   --   --   --   --   --   --  95   AST  --   --   --   --   --   --   --  19   ALT  --   --   --   --   --   --   --  24   < > = values in this interval not displayed.  CBC:   Recent Labs  Lab 11/24/17  0633 11/23/17  0730 11/22/17  0648 11/21/17  0647   WBC 9.6 9.7 10.1 9.9   RBC 3.77* 3.49* 3.52* 3.64*   HGB 11.1* 10.2* 10.3* 10.7*   HCT 37.9 34.9* 35.6 37.0   * 100 101* 102*   MCH 29.4 29.2 29.3 29.4   MCHC 29.3* 29.2* 28.9* 28.9*   RDW 16.4* 16.7* 16.7* 16.7*    263 283 299     INR:   Recent Labs  Lab 11/17/17  1105   INR 0.85*     Glucose:   Recent Labs  Lab 11/24/17  0633 11/23/17  0730 11/22/17  0648 11/21/17  0647 11/20/17  0813 11/19/17  1751 11/19/17  0702   GLC 81 86 131* 75 85  --  84   BGM  --   --   --   --   --  152*  --      Blood Gas:   Recent Labs  Lab 11/20/17  1600 11/20/17  0813 11/19/17  1820 11/18/17  1120 11/17/17  2343   PHV  --  7.38  --  7.43 7.31*   PCO2V  --  78*  --  56* 83*   PO2V  --  37  --  43 28   HCO3V  --  45*  --  37* 42*   GERARDO  --  17.0  --  10.9 12.6   O2PER 6L 50.0 50 12L 55     Culture Data   Recent Labs  Lab 11/17/17  1609   CULT No growth     Virology Data: Lab Results   Component Value Date    FLUAH1 Negative 11/18/2017    FLUAH3 Negative 11/18/2017    GT3089 Negative 11/18/2017    IFLUB Negative 11/18/2017    RSVA Negative 11/18/2017    RSVB Negative 11/18/2017    PIV1 Negative 11/18/2017    PIV2 Negative 11/18/2017    PIV3 Negative 11/18/2017    HMPV Negative 11/18/2017    HRVS Negative 11/18/2017    ADVBE Negative 11/18/2017    ADVC Negative 11/18/2017    ADVC Negative 11/09/2017    ADVC Negative 10/23/2017     Historical CMV results (last 3 of prior testing):  Lab Results   Component Value Date    CMVQNT CMV DNA Not Detected 11/09/2017    CMVQNT CMV DNA Not Detected 10/25/2017    CMVQNT  08/02/2017     CMV DNA Not Detected   Mutations within the highly conserved regions of the viral genome covered by   the SAVANNAH  AmpliPrep/SAVANNAH TaqMan CMV Test primers and/or probes have been   identified and may result in under-quantitation of or failure to detect the   virus.  Supplemental testing methods should be used for testing when this is   suspected.   The SAVANNAH AmpliPrep/SAVANNAH TaqMan CMV Test is an FDA-approved in vitro nucleic   acid amplification test for the quantitation of cytomegalovirus DNA in human   plasma (EDTA plasma) using the SAVANNAH AmpliPrep Instrument for automated viral   nucleic acid extraction and the SAVANNAH TaqMan Analyzer or SAVANNAH TaqMan for   automated Real Time amplification and detection of the viral nucleic acid   target.   Titer results are reported in International Units/mL (IU/mL using 1st WHO   International standard for Human Cytomegalovirus for Nucleic Acid Amplification   based assays. The conversion factor between CMV DNA copis/mL (as defined by the   Roche SAVANNAH TaqMan CMV test) and International Units is the CMV DNA   concentration in IU/mL x 1.1 copies/IU = CMV DNA in copies/mL.   This assay has received FDA approval for the testing of human plasma only. The   [facility] Diagnostic Laboratory at the [facility], [place], has validated the performance characteristics of the Roche   CMV assay for plasma, bronchial alveolar lavage/wash and urine.       Lab Results   Component Value Date    CMVLOG Not Calculated 11/09/2017    CMVLOG Not Calculated 10/25/2017    CMVLOG Not Calculated 08/02/2017     Urine Studies  Recent Labs   Lab Test  11/17/17   2130   URINEPH  Canceled, Test credited  5.0   NITRITE  Canceled, Test credited*  Negative   LEUKEST  Canceled, Test credited*  Negative   WBCU  Canceled, Test credited*  1     Patient was seen and examined by me. I personally reviewed the electronic medical record including labs, flowsheets, imaging reports and films, vitals, and medications. I discussed the case in detail with the Nurse Practitioner. I agree with  Ayaka Delatorre's assessment and plan.    I spent 15 minutes care time excluding teaching and procedures    S/p L lung transplant 2/2 COPD in 2008:  CLAD: C/b CLAD, EBV viremia. Last DSA negative 07/2017. Over the past year, baseline FEV1 down 50% from baseline. Most recently on 11/9- FEV1 15%, down from recent baseline of ~ 20%.  - Continue CLAD meds: azithromycin MWF, singulair QHS. Advair BID (started 11/20).   - Continue atrovent nasal spray QID  - Duonebs PRN      Continue 2 drug immunosuppression:  - Tacrolimus 1.5 mg BID, decreased 11/21 d/t supra-therapeutic level of 11.4 (13.5 h level). Goal 8-10. Next level scheduled for 11/24 (ordered).  - Imuran discontinued several months ago given hx of EBV viremia  - Prednisone 10 mg daily      Ppx:  - Bactrim MWF for PJP  - PPI daily for GI ppx    Review of Systems:      C: no fever, no chills, no change in weight, no change in appetite  INTEGUMENTARY/SKIN: no rash or obvious new lesions  ENT/MOUTH: no sore throat, no sinus pain, no nasal drainage  RESP: see interval history  CV: no chest pain, no palpitations, no peripheral edema, no orthopnea  GI: no nausea, no vomiting, no change in stools, no reflux symptoms  : no dysuria  MUSCULOSKELETAL: no myalgias, no arthralgias  ENDOCRINE: blood sugars with adequate control  NEURO: no headache, no numbness or tingling  PSYCHIATRIC: mood stable         Physical Exam:      Constitutional: Awake, alert, in no apparent distress.   HEENT: Eyes with pink conjunctivae, no scleral jaundice.  Oral mucosa moist, without lesions.   PULM: Diminished air flow bilaterally. Fine crackles to LLL. No rhonchi, no wheeze. Breathing non-labored.   CV: Normal S1 and S2. RRR.  No murmur, gallop, or rub.  No peripheral edema.  Peripheral pulses intact.   ABD: NABS, soft, nontender, nondistended.  No guarding.   MSK: Moves all extremities. + apparent muscle wasting.   NEURO: Alert and oriented x 4, conversant.   SKIN: Warm, dry. No pruritus. No  rash on limited exam. Scattered ecchymosis.    PSYCH: Mood stable, judgment and insight appropriate. Forgetful at times.       Say Genao MD, MACM   of Medicine  Pulmonary, Critical Care and Sleep Medicine  [facility]  Pager: [phone]     yes

## 2023-05-04 NOTE — ED ADULT NURSE NOTE - OBJECTIVE STATEMENT
Complaining of left sided back pain, worst today. Denies new injury or falls. Pt stated she fell over 2 weeks ago ans was on the floor for a couple of hours, and had surgery 4/14 Complaining of left sided back pain, worst today. Denies new injury or falls. Pt stated she fell over 2 weeks ago ans was on the floor for a couple of hours, and had surgery 4/14 for left elbow Fx after fall. Denies numbness or tingling on extremities, able to move arms and legs without difficulty. Pt unable to walk at home as per son. Pulses palpable.

## 2023-05-04 NOTE — ED PROVIDER NOTE - MUSCULOSKELETAL, MLM
Spine appears normal, range of motion is not limited, There is point tenderness over the left buttock SI joint area.  There is no hip thigh knee or lower leg tenderness or deformity.  Full range of motion pulses and sensation intact.  There is a well-healed surgical scar over the left elbow..

## 2023-05-04 NOTE — H&P ADULT - NSICDXFAMILYHX_GEN_ALL_CORE_FT
FAMILY HISTORY:  Father  Still living? No  Family hx of lung cancer, Age at diagnosis: Age Unknown    Mother  Still living? No  Family hx of colon cancer, Age at diagnosis: Age Unknown  FH: ovarian cancer, Age at diagnosis: Age Unknown    Sibling  Still living? No  Family hx of lung cancer, Age at diagnosis: Age Unknown

## 2023-05-04 NOTE — ED ADULT NURSE NOTE - NSIMPLEMENTINTERV_GEN_ALL_ED
Implemented All Fall with Harm Risk Interventions:  [place] to call system. Call bell, personal items and telephone within reach. Instruct patient to call for assistance. Room bathroom lighting operational. Non-slip footwear when patient is off stretcher. Physically safe environment: no spills, clutter or unnecessary equipment. Stretcher in lowest position, wheels locked, appropriate side rails in place. Provide visual cue, wrist band, yellow gown, etc. Monitor gait and stability. Monitor for mental status changes and reorient to person, place, and time. Review medications for side effects contributing to fall risk. Reinforce activity limits and safety measures with patient and family. Provide visual clues: red socks.

## 2023-05-04 NOTE — CONSULT NOTE ADULT - SUBJECTIVE AND OBJECTIVE BOX
Pt Name: ELIZABETH GONZALEZ    MRN: [id number]      Patient is a 78y Female presenting to the emergency department with a chief complaint of Patient is a 78y old  Female who presents with a chief complaint of low back pain with a 3 week history of a fall without low back pain at that time who presents with low trae pain for 4-5 days.  No recent history of trauma or injury. .    HEALTH ISSUES - PROBLEM Dx:      .      REVIEW OF SYSTEMS:    Constitutional: No fever, weight loss or fatigue  Eyes: No eye pain, visual disturbances, or discharge  ENT:  No difficulty hearing, tinnitus, vertigo; No sinus or throat pain  Neck: No pain or stiffness  Respiratory: No cough, wheezing, chills or hemoptysis  Cardiovascular: No chest pain, palpitations, shortness of breath, dizziness or leg swelling  Gastrointestinal: No abdominal or epigastric pain. No nausea, vomiting or hematemesis; No diarrhea or constipation. No melena or hematochezia.  Genitourinary: No dysuria, frequency, hematuria or incontinence  Rectal: No pain, hemorrhoids or incontinence  Neurological: No headaches, memory loss, loss of strength, numbness or tremors  Skin: No itching, burning, rashes or lesions   Lymph Nodes: No enlarged glands  Endocrine: No heat or cold intolerance; No hair loss  Musculoskeletal: No joint pain or swelling; No muscle, back or extremity pain  Psychiatric: No depression, anxiety, mood swings or difficulty sleeping  Heme/Lymph: No easy bruising or bleeding gums  Allergy and Immunologic: No hives or eczema        ROS is otherwise negative.    PAST MEDICAL & SURGICAL HISTORY:  H/O ulcerative colitis      History of diverticulitis      GERD (gastroesophageal reflux disease)      Hypertension      Hyperlipemia      Mild anemia      Breast cancer      H/O vertigo      Gastric ulcer      S/P lumpectomy, left breast      S/P right oophorectomy      S/P appendectomy      S/P lumbar fusion      S/P lumbar spine operation      S/P hysterectomy      S/P cataract surgery      H/O elbow surgery          Allergies: No Known Allergies      Medications: acetaminophen     Tablet .. 1000 milliGRAM(s) Oral every 8 hours  atorvastatin 10 milliGRAM(s) Oral at bedtime  celecoxib 100 milliGRAM(s) Oral every 12 hours  cholecalciferol 1000 Unit(s) Oral daily  citalopram 10 milliGRAM(s) Oral daily  enalapril 10 milliGRAM(s) Oral daily  enoxaparin Injectable 40 milliGRAM(s) SubCutaneous every 24 hours  folic acid 1 milliGRAM(s) Oral daily  lidocaine   4% Patch 1 Patch Transdermal <User Schedule>  methimazole 5 milliGRAM(s) Oral daily  metoprolol succinate ER 25 milliGRAM(s) Oral daily  pantoprazole    Tablet 40 milliGRAM(s) Oral before breakfast  polyethylene glycol 3350 17 Gram(s) Oral daily PRN  potassium chloride    Tablet ER 10 milliEquivalent(s) Oral daily  traMADol 50 milliGRAM(s) Oral every 4 hours PRN      FAMILY HISTORY:  Family hx of lung cancer (Father)    Family hx of colon cancer (Mother)    Family hx of lung cancer (Sibling)    FH: ovarian cancer (Mother)    : non-contributory    Social History:     Ambulation: Walking independently [ ] With Cane [ ] With Walker [ ]  Bedbound [ ]                           11.3   5.29  )-----------( 221      ( 04 May 2023 16:00 )             35.5     05-04    135  |  97  |  10  ----------------------------<  107<H>  3.2<L>   |  26  |  0.64    Ca    9.4      04 May 2023 16:00    TPro  7.4  /  Alb  3.4  /  TBili  0.9  /  DBili  x   /  AST  18  /  ALT  17  /  AlkPhos  135<H>  05-04      PHYSICAL EXAM:    Vital Signs Last 24 Hrs  T(C): 37 (04 May 2023 18:20), Max: 37 (04 May 2023 18:20)  T(F): 98.6 (04 May 2023 18:20), Max: 98.6 (04 May 2023 18:20)  HR: 56 (04 May 2023 18:20) (56 - 80)  BP: 154/77 (04 May 2023 18:20) (154/77 - 200/89)  BP(mean): --  RR: 16 (04 May 2023 18:20) (14 - 16)  SpO2: 97% (04 May 2023 18:20) (97% - 99%)    Parameters below as of 04 May 2023 18:20  Patient On (Oxygen Delivery Method): room air      Daily Height in cm: 157.48 (04 May 2023 12:46)    Daily     Appearance: Alert, responsive, in no acute distress.    Skin: no rash on visible skin. Skin is clean, dry and intact. No bleeding. No abrasions. No ulcerations.    Vascular: 2+ distal pulses. Cap refill < 2 sec. No signs of venous insuffiency or stasis. No extremity ulcerations. No cyanosis.    Musculoskeletal:         Left Upper Extremity: Atraumatic with normal alignment NROM. No crepitus. No bony tenderness.        Right Upper Extremity: Atraumatic with normal alignment NROM. No crepitus. No bony tenderness.        Left Lower Extremity: Atraumatic with normal alignment NROM. No crepitus. No bony tenderness.        Right Lower Extremity: Atraumatic with normal alignment NROM. No crepitus. No bony tenderness.     Neurological: Sensation is grossly intact to light touch. No focal deficits or weaknesses found.    Pathologic reflexes: [ ] [place],  [ ]  Clonus            Reflexes:   Biceps, Brachioradialis, Patella, Achilles intact            Motor exam: [  ]          [ ] Upper extremity              Bi(c5)  WE(c6)  EE(c7)   FF(c8)                                                R         5/5        5/5        5/5       5/5                                               L          5/5        5/5        5/5       5/5         [x ] Lower extremity          HF(l2)   KE(l3)    TA(l4)   EHL(l5)  GS(s1)                                                 R        5/5        5/5        5/5       5/5         5/5                                               L         5/5        5/5       5/5       5/5          5/5    Imaging Studies: CT scan of the pelvis and low back reveals a left inferior ramus fracture and a left sacral ala fracture consistent with a sacral insufficiency fracture.    A/P:  Pt is a  78y Female with Patient is a 78y old  Female who presents with a chief complaint of  found to have a sacral fracture      PLAN:  * Pain control  * supportive care with medication and physical therapy

## 2023-05-04 NOTE — ED PROVIDER NOTE - CLINICAL SUMMARY MEDICAL DECISION MAKING FREE TEXT BOX
78-year-old female with history of ulcerative colitis hypertension chronic back pain recent fall with elbow fracture status post ORIF left elbow.  Has had problem with opioid abuse.  Brought by son for evaluation of left lower back pain radiating into buttock for the past 4 weeks.  Took Tylenol at home without improvement.  Denies new injury.  Son states patient had a superior ramus pelvis fracture 1 month ago from same fall.  Denies headache neck pain chest pain shortness of breath cough vomiting diarrhea dysuria hematuria or other symptom.    Physical exam reveals tenderness over left buttock consistent with sciatica.  Plan is CT of lumbar spine and pelvis to reevaluate recent pelvis fracture.  Will give pain meds and recommend Ortho follow-up.

## 2023-05-04 NOTE — H&P ADULT - ASSESSMENT
78F Ulcerative colitis, GERD/ PUD, HTN, HLD, Left breast CA s/p lumpectomy, s/p fall approx 3 weeks ago with left elbow fx s/p ORIF, with increasing left lower back pain.     Back pain, left radiating to right, without neurological deficit  - d/w Dr Murphy at bedside  - Pain control: Tylenol, celebrex, lidocaine patch and tramadol prn  - PT/OT    HTN  - continue toprol and enalapril with hold parameters    HLD   - continue statin     Anxiety  - continue celexa    GERD/ PUD  - continue daily PPI    Hypokalemia  - repletion as ordered    DVT proph  - lovenox daily    Dispo: pending course    D/w Son Jens at bedside

## 2023-05-04 NOTE — H&P ADULT - NSHPPHYSICALEXAM_GEN_ALL_CORE
PHYSICAL EXAM:  Vital Signs Last 24 Hrs  T(C): 36.8 (04 May 2023 12:46), Max: 36.8 (04 May 2023 12:46)  T(F): 98.2 (04 May 2023 12:46), Max: 98.2 (04 May 2023 12:46)  HR: 80 (04 May 2023 12:46) (80 - 80)  BP: 200/89 (04 May 2023 12:46) (200/89 - 200/89)  BP(mean): --  RR: 14 (04 May 2023 12:46) (14 - 14)  SpO2: 99% (04 May 2023 12:46) (99% - 99%)    GENERAL: NAD, well-groomed, well-developed  HEAD:  Atraumatic, Normocephalic  EYES:  conjunctiva and sclera clear  ENMT Moist mucous membranes, mult missing teeth lower gum  NECK: Supple, No JVD  NERVOUS SYSTEM:  Alert & Oriented X3, Good concentration; Motor Strength 5/5 B/L upper and lower extremities;  CHEST/LUNG: Clear to auscultation bilaterally; No rales, rhonchi, wheezing, or rubs  HEART: Regular rate and rhythm; No murmurs, rubs, or gallops  ABDOMEN: Soft, Nontender, Nondistended; Bowel sounds present  EXTREMITIES:  2+ Peripheral Pulses, No clubbing, cyanosis, or edema  LYMPH: No lymphadenopathy noted  SKIN: No rashes or lesions  Tenderness to palpation above left iliac crest

## 2023-05-04 NOTE — H&P ADULT - NSICDXPASTSURGICALHX_GEN_ALL_CORE_FT
PAST SURGICAL HISTORY:  H/O elbow surgery     S/P appendectomy     S/P cataract surgery     S/P hysterectomy     S/P lumbar fusion     S/P lumbar spine operation     S/P lumpectomy, left breast     S/P right oophorectomy

## 2023-05-05 ENCOUNTER — TRANSCRIPTION ENCOUNTER (OUTPATIENT)
Age: 78
End: 2023-05-05

## 2023-05-05 LAB
ANION GAP SERPL CALC-SCNC: 9 MMOL/L — SIGNIFICANT CHANGE UP (ref 5–17)
BASOPHILS # BLD AUTO: 0.02 K/UL — SIGNIFICANT CHANGE UP (ref 0–0.2)
BASOPHILS NFR BLD AUTO: 0.5 % — SIGNIFICANT CHANGE UP (ref 0–2)
BUN SERPL-MCNC: 11 MG/DL — SIGNIFICANT CHANGE UP (ref 7–23)
CALCIUM SERPL-MCNC: 9.3 MG/DL — SIGNIFICANT CHANGE UP (ref 8.4–10.5)
CHLORIDE SERPL-SCNC: 101 MMOL/L — SIGNIFICANT CHANGE UP (ref 96–108)
CO2 SERPL-SCNC: 25 MMOL/L — SIGNIFICANT CHANGE UP (ref 22–31)
CREAT SERPL-MCNC: 0.66 MG/DL — SIGNIFICANT CHANGE UP (ref 0.5–1.3)
EGFR: 89 ML/MIN/1.73M2 — SIGNIFICANT CHANGE UP
EOSINOPHIL # BLD AUTO: 0.06 K/UL — SIGNIFICANT CHANGE UP (ref 0–0.5)
EOSINOPHIL NFR BLD AUTO: 1.6 % — SIGNIFICANT CHANGE UP (ref 0–6)
GLUCOSE SERPL-MCNC: 84 MG/DL — SIGNIFICANT CHANGE UP (ref 70–99)
HCT VFR BLD CALC: 37.3 % — SIGNIFICANT CHANGE UP (ref 34.5–45)
HGB BLD-MCNC: 11.6 G/DL — SIGNIFICANT CHANGE UP (ref 11.5–15.5)
IMM GRANULOCYTES NFR BLD AUTO: 0.3 % — SIGNIFICANT CHANGE UP (ref 0–0.9)
LYMPHOCYTES # BLD AUTO: 1.13 K/UL — SIGNIFICANT CHANGE UP (ref 1–3.3)
LYMPHOCYTES # BLD AUTO: 29.5 % — SIGNIFICANT CHANGE UP (ref 13–44)
MAGNESIUM SERPL-MCNC: 2 MG/DL — SIGNIFICANT CHANGE UP (ref 1.6–2.6)
MCHC RBC-ENTMCNC: 27.2 PG — SIGNIFICANT CHANGE UP (ref 27–34)
MCHC RBC-ENTMCNC: 31.1 GM/DL — LOW (ref 32–36)
MCV RBC AUTO: 87.4 FL — SIGNIFICANT CHANGE UP (ref 80–100)
MONOCYTES # BLD AUTO: 0.42 K/UL — SIGNIFICANT CHANGE UP (ref 0–0.9)
MONOCYTES NFR BLD AUTO: 11 % — SIGNIFICANT CHANGE UP (ref 2–14)
NEUTROPHILS # BLD AUTO: 2.19 K/UL — SIGNIFICANT CHANGE UP (ref 1.8–7.4)
NEUTROPHILS NFR BLD AUTO: 57.1 % — SIGNIFICANT CHANGE UP (ref 43–77)
NRBC # BLD: 0 /100 WBCS — SIGNIFICANT CHANGE UP (ref 0–0)
PLATELET # BLD AUTO: 181 K/UL — SIGNIFICANT CHANGE UP (ref 150–400)
POTASSIUM SERPL-MCNC: 4.3 MMOL/L — SIGNIFICANT CHANGE UP (ref 3.5–5.3)
POTASSIUM SERPL-SCNC: 4.3 MMOL/L — SIGNIFICANT CHANGE UP (ref 3.5–5.3)
RBC # BLD: 4.27 M/UL — SIGNIFICANT CHANGE UP (ref 3.8–5.2)
RBC # FLD: 14.3 % — SIGNIFICANT CHANGE UP (ref 10.3–14.5)
SODIUM SERPL-SCNC: 135 MMOL/L — SIGNIFICANT CHANGE UP (ref 135–145)
WBC # BLD: 3.83 K/UL — SIGNIFICANT CHANGE UP (ref 3.8–10.5)
WBC # FLD AUTO: 3.83 K/UL — SIGNIFICANT CHANGE UP (ref 3.8–10.5)

## 2023-05-05 PROCEDURE — 99232 SBSQ HOSP IP/OBS MODERATE 35: CPT

## 2023-05-05 RX ORDER — TRAMADOL HYDROCHLORIDE 50 MG/1
100 TABLET ORAL EVERY 6 HOURS
Refills: 0 | Status: DISCONTINUED | OUTPATIENT
Start: 2023-05-05 | End: 2023-05-07

## 2023-05-05 RX ORDER — SENNA PLUS 8.6 MG/1
2 TABLET ORAL AT BEDTIME
Refills: 0 | Status: DISCONTINUED | OUTPATIENT
Start: 2023-05-05 | End: 2023-05-07

## 2023-05-05 RX ADMIN — CELECOXIB 100 MILLIGRAM(S): 200 CAPSULE ORAL at 06:21

## 2023-05-05 RX ADMIN — TRAMADOL HYDROCHLORIDE 50 MILLIGRAM(S): 50 TABLET ORAL at 08:38

## 2023-05-05 RX ADMIN — Medication 1000 MILLIGRAM(S): at 06:19

## 2023-05-05 RX ADMIN — Medication 1000 MILLIGRAM(S): at 06:32

## 2023-05-05 RX ADMIN — Medication 1000 MILLIGRAM(S): at 21:39

## 2023-05-05 RX ADMIN — Medication 10 MILLIEQUIVALENT(S): at 12:48

## 2023-05-05 RX ADMIN — CELECOXIB 100 MILLIGRAM(S): 200 CAPSULE ORAL at 06:32

## 2023-05-05 RX ADMIN — PANTOPRAZOLE SODIUM 40 MILLIGRAM(S): 20 TABLET, DELAYED RELEASE ORAL at 06:19

## 2023-05-05 RX ADMIN — Medication 1 MILLIGRAM(S): at 12:49

## 2023-05-05 RX ADMIN — LIDOCAINE 1 PATCH: 4 CREAM TOPICAL at 10:09

## 2023-05-05 RX ADMIN — Medication 10 MILLIGRAM(S): at 06:19

## 2023-05-05 RX ADMIN — CELECOXIB 100 MILLIGRAM(S): 200 CAPSULE ORAL at 21:38

## 2023-05-05 RX ADMIN — ATORVASTATIN CALCIUM 10 MILLIGRAM(S): 80 TABLET, FILM COATED ORAL at 21:39

## 2023-05-05 RX ADMIN — Medication 1000 MILLIGRAM(S): at 14:30

## 2023-05-05 RX ADMIN — SENNA PLUS 2 TABLET(S): 8.6 TABLET ORAL at 21:39

## 2023-05-05 RX ADMIN — CELECOXIB 100 MILLIGRAM(S): 200 CAPSULE ORAL at 21:45

## 2023-05-05 RX ADMIN — Medication 1000 MILLIGRAM(S): at 21:45

## 2023-05-05 RX ADMIN — TRAMADOL HYDROCHLORIDE 50 MILLIGRAM(S): 50 TABLET ORAL at 09:08

## 2023-05-05 RX ADMIN — Medication 1000 UNIT(S): at 12:50

## 2023-05-05 RX ADMIN — Medication 25 MILLIGRAM(S): at 06:20

## 2023-05-05 RX ADMIN — ENOXAPARIN SODIUM 40 MILLIGRAM(S): 100 INJECTION SUBCUTANEOUS at 21:38

## 2023-05-05 RX ADMIN — TRAMADOL HYDROCHLORIDE 100 MILLIGRAM(S): 50 TABLET ORAL at 14:55

## 2023-05-05 RX ADMIN — CITALOPRAM 10 MILLIGRAM(S): 10 TABLET, FILM COATED ORAL at 12:49

## 2023-05-05 RX ADMIN — TRAMADOL HYDROCHLORIDE 100 MILLIGRAM(S): 50 TABLET ORAL at 14:25

## 2023-05-05 RX ADMIN — Medication 1000 MILLIGRAM(S): at 15:07

## 2023-05-05 NOTE — PATIENT CHOICE NOTE. - NSPTCHOICESTATE_GEN_ALL_CORE

## 2023-05-05 NOTE — OCCUPATIONAL THERAPY INITIAL EVALUATION ADULT - PERTINENT HX OF CURRENT PROBLEM, REHAB EVAL
77 y/o female with PMHx HTN, HLD, GERD, Breast CA s/p Left lumpectomy (7years ago), Ulcerative Colitis, s/p Left Elbow ORIF(4/14/23), presents from home with c/o severe back pain originating on the L lower flank & radiating throughout the entire lower back. She said the pain started a few days ago but became unbearable since yesterday. She has been unable to sleep in bed and has been sleeping on the couch for 4x days and this morning was unable to stand. Pain is relieved when the patient is lying still or sitting up, and worse with movement and standing position. She attempted Tylenol, Bengay and a 1x dose of Aleve for pain with no relief. Pt reports that she is unable to stand even now when she needed to use the bathroom. She denies N/V/D/C, any change in bladder or bowel habits, recent sick contact and recent travel.  CT pelvis: Ongoing healing of subacute comminuted mildly displaced/impacted L pubic fracture. Ongoing healing of subacute nondisplaced oblique longitudinal sacral fracture extending through left-sided neural foramina at S1-S2 and S3-S3. CT L spine: Redemonstration of L4 total laminectomy and posterior fusion of L4 and L5. No evidence for hardware fracture or loosening. No acute fracture or traumatic subluxation. Mild compression deformity of the superior endplate of L2 is associated with the presence of a Schmorl's node. Degenerative changes. Evaluation of the spinal canal contents is limited by CT modality and streak artifact from spinal hardware. Suspected moderate spinal canal stenosis at L2-L3 and L3-L4. Multilevel neural foraminal narrowing, mild to moderate in degree.

## 2023-05-05 NOTE — DISCHARGE NOTE PROVIDER - CARE PROVIDER_API CALL
Kwabena Murphy)  Orthopaedic Surgery  [address], [address]  [place] [address]  Phone: [phone]  Fax: [phone]  Follow Up Time:     PMD,   Phone: (   )    -  Fax: (   )    -  Follow Up Time:

## 2023-05-05 NOTE — DISCHARGE NOTE PROVIDER - NSDCMRMEDTOKEN_GEN_ALL_CORE_FT
cefadroxil 500 mg oral capsule: 1 cap(s) orally 2 times a day  citalopram 10 mg oral tablet: 1 orally once a day  enalapril 10 mg oral tablet: 1 orally once a day  Humira 40 mg/0.8 mL subcutaneous kit: 40 subcutaneously every 2 weeks  Klor-Con 10 oral tablet, extended release: 1 tab(s) orally once a day  Lipitor 10 mg oral tablet: 1 tab(s) orally every other day  methIMAzole 5 mg oral tablet: 1 orally once a day  pantoprazole 40 mg oral delayed release tablet: 1 tab(s) orally once a day  Toprol-XL 25 mg oral tablet, extended release: 1 tab(s) orally once a day  traMADol 50 mg oral tablet: 1 tab(s) orally every 6 hours as needed for  moderate pain MDD: 4  Tylenol Extra Strength 500 mg oral tablet: 1 tab(s) orally every 6 hours as needed for  mild pain  Vitamin D3 1000 intl units oral tablet: 1 tab(s) orally once a day   cefadroxil 500 mg oral capsule: 1 cap(s) orally 2 times a day  citalopram 10 mg oral tablet: 1 orally once a day  enalapril 10 mg oral tablet: 1 tablet orally 2 times a day  folic acid 1 mg oral tablet: 1 tab(s) orally once a day  Humira 40 mg/0.8 mL subcutaneous kit: 40 subcutaneously every 2 weeks  Klor-Con 10 oral tablet, extended release: 1 tab(s) orally once a day  Lipitor 10 mg oral tablet: 1 tab(s) orally every other day  methIMAzole 5 mg oral tablet: 1 orally once a day  pantoprazole 40 mg oral delayed release tablet: 1 tab(s) orally once a day  polyethylene glycol 3350 oral powder for reconstitution: 17 gram(s) orally once a day As needed Constipation  senna leaf extract oral tablet: 2 tab(s) orally once a day (at bedtime)  Toprol-XL 25 mg oral tablet, extended release: 1 tab(s) orally once a day  traMADol 50 mg oral tablet: 1 tab(s) orally every 6 hours as needed for  moderate pain MDD: 4  Tylenol Extra Strength 500 mg oral tablet: 1 tab(s) orally every 6 hours as needed for  mild pain  Vitamin D3 1000 intl units oral tablet: 1 tab(s) orally once a day

## 2023-05-05 NOTE — OCCUPATIONAL THERAPY INITIAL EVALUATION ADULT - TRANSFER TRAINING, PT EVAL
Pt will be able to perform sit to stand transfers with mod A x1 within 3-5 sessions, with DME as needed

## 2023-05-05 NOTE — DISCHARGE NOTE PROVIDER - NSDCFUSCHEDAPPT_GEN_ALL_CORE_FT
Elliott Limon  [facility]  ORTHOSURG [address]  Scheduled Appointment: 05/25/2023

## 2023-05-05 NOTE — CARE COORDINATION ASSESSMENT. - NSPASTMEDSURGHISTORY_GEN_ALL_CORE_FT
PAST MEDICAL & SURGICAL HISTORY:  Gastric ulcer      H/O vertigo      Breast cancer      Mild anemia      Hyperlipemia      Hypertension      GERD (gastroesophageal reflux disease)      History of diverticulitis      H/O ulcerative colitis      S/P cataract surgery      S/P hysterectomy      S/P lumbar spine operation      S/P lumbar fusion      S/P appendectomy      S/P right oophorectomy      S/P lumpectomy, left breast      H/O elbow surgery

## 2023-05-05 NOTE — DISCHARGE NOTE PROVIDER - CARE PROVIDERS DIRECT ADDRESSES
[email or web address]

## 2023-05-05 NOTE — CARE COORDINATION ASSESSMENT. - ASSESSMENT CONCERNS TO BE ADDRESSED
Pt is a 78 year old female who lives home alone in [facility].  No steps.  Admitted for back pain as per patient and could not get out of bed.  Pt was at [facility] last month s/p fall with fractured elbow.  She is alert and oriented.  Ambulated with walker pta.  PT=[facility].  VIVIANE at bedside.  List provided for choices.  STEPHEN requested./care coordination

## 2023-05-05 NOTE — CARE COORDINATION ASSESSMENT. - NSCAREPROVIDERS_GEN_ALL_CORE_FT
CARE PROVIDERS:  Accepting Physician: Ne Coello  Access Services: Bushra Bruce  Administration: Vero Mcgovern  Administration: Justine Camargo  Admitting: Ne Coello  Attending: Ne Coello  Consultant: Kwabena Murphy  Covering Team: Caden Vaughn  ED Attending: Louie Meadows  ED Nurse: Joi Adler  Nurse: Kenzie Spear  Nurse: Allegra Dalal  Nurse: Sharla Carreon  Nurse: Kirstin Corea  Nurse: Beatriz Rojas  Override: Kenzie Spear  PCA/Nursing Assistant: Bernice Domínguez  Physical Therapy: Bharath Lacey  Primary Team: Miguel Ángel Green  Primary Team: Ne Coello  Primary Team: Carmelo Street  Primary Team: Farshad Hull  Primary Team: Joesfina London  Registered Dietitian: Yuni Orellana  Research: Jack Burton  : Serge Horn  Student: Jenae Patiño  Team: [facility]  Hospitalists, Team  Technician: Lolly Olvera// Supp. Assoc.: Lucia Dhillon

## 2023-05-05 NOTE — DISCHARGE NOTE PROVIDER - HOSPITAL COURSE
78y F with PMHx HTN, HLD, GERD, Breast CA s/p Left lumpectomy (7years ago), Ulcerative Colitis, s/p Left Elbow ORIF(4/14/23), presents from home with c/o severe back pain originating on the left lower flank and radiating throughout the entire lower back. She said the pain started a few days ago but became unbearable since yesterday. She has been unable to sleep in bed and has been sleeping on the couch for 4x days and this morning was unable to stand. Pain is relieved when the patient is lying still or sitting up, and worse with movement and standing position. She attempted Tylenol, Bengay and a 1x dose of Aleve for pain with no relief. Pt reports that she is unable to stand even now when she needed to use the bathroom. She denies N/V/D/C, any change in bladder or bowel habiits, recent sick contact and recent travel.     Back pain, left radiating to right, without neurological deficit  - d/w Dr Murphy at bedside, consult appreciated  - Pain control: Tylenol, celebrex, lidocaine patch and tramadol prn  - PT/OT  - bowel regimen while on pain meds    HTN  - continue toprol and enalapril with hold parameters    HLD   - continue statin     Anxiety  - continue celexa    GERD/ PUD  - continue daily PPI    Hypokalemia  - repletion as ordered    DVT proph  - lovenox daily    Dispo: [facility]

## 2023-05-05 NOTE — PHYSICAL THERAPY INITIAL EVALUATION ADULT - RANGE OF MOTION EXAMINATION, REHAB EVAL
left UE not tested due to pain/swelling/recent ORIF/Right UE ROM was WFL (within functional limits)/bilateral lower extremity ROM was WFL (within functional limits)

## 2023-05-05 NOTE — DISCHARGE NOTE PROVIDER - NSDCCPCAREPLAN_GEN_ALL_CORE_FT
PRINCIPAL DISCHARGE DIAGNOSIS  Diagnosis: Sacral fracture  Assessment and Plan of Treatment: seen by ortho - pain control, PT/OT, supportive care      SECONDARY DISCHARGE DIAGNOSES  Diagnosis: Back pain  Assessment and Plan of Treatment:

## 2023-05-05 NOTE — DISCHARGE NOTE PROVIDER - NSDCACTIVITY_GEN_ALL_CORE
Bathing allowed/Do not drive or operate machinery/Showering allowed/Do not make important decisions/Walking - Indoors allowed/No heavy lifting/straining/Walking - Outdoors allowed

## 2023-05-06 ENCOUNTER — TRANSCRIPTION ENCOUNTER (OUTPATIENT)
Age: 78
End: 2023-05-06

## 2023-05-06 LAB — SARS-COV-2 RNA SPEC QL NAA+PROBE: SIGNIFICANT CHANGE UP

## 2023-05-06 PROCEDURE — 99232 SBSQ HOSP IP/OBS MODERATE 35: CPT

## 2023-05-06 RX ORDER — SENNA PLUS 8.6 MG/1
2 TABLET ORAL
Qty: 0 | Refills: 0 | DISCHARGE
Start: 2023-05-06

## 2023-05-06 RX ORDER — POLYETHYLENE GLYCOL 3350 17 G/17G
17 POWDER, FOR SOLUTION ORAL
Qty: 0 | Refills: 0 | DISCHARGE
Start: 2023-05-06

## 2023-05-06 RX ORDER — FOLIC ACID 0.8 MG
1 TABLET ORAL
Qty: 0 | Refills: 0 | DISCHARGE
Start: 2023-05-06

## 2023-05-06 RX ADMIN — CELECOXIB 100 MILLIGRAM(S): 200 CAPSULE ORAL at 18:26

## 2023-05-06 RX ADMIN — LIDOCAINE 1 PATCH: 4 CREAM TOPICAL at 21:00

## 2023-05-06 RX ADMIN — Medication 1000 MILLIGRAM(S): at 06:36

## 2023-05-06 RX ADMIN — PANTOPRAZOLE SODIUM 40 MILLIGRAM(S): 20 TABLET, DELAYED RELEASE ORAL at 06:27

## 2023-05-06 RX ADMIN — LIDOCAINE 1 PATCH: 4 CREAM TOPICAL at 08:17

## 2023-05-06 RX ADMIN — Medication 1000 MILLIGRAM(S): at 13:52

## 2023-05-06 RX ADMIN — Medication 10 MILLIGRAM(S): at 13:23

## 2023-05-06 RX ADMIN — CELECOXIB 100 MILLIGRAM(S): 200 CAPSULE ORAL at 18:30

## 2023-05-06 RX ADMIN — CELECOXIB 100 MILLIGRAM(S): 200 CAPSULE ORAL at 06:36

## 2023-05-06 RX ADMIN — TRAMADOL HYDROCHLORIDE 100 MILLIGRAM(S): 50 TABLET ORAL at 16:10

## 2023-05-06 RX ADMIN — LIDOCAINE 1 PATCH: 4 CREAM TOPICAL at 18:30

## 2023-05-06 RX ADMIN — TRAMADOL HYDROCHLORIDE 100 MILLIGRAM(S): 50 TABLET ORAL at 15:18

## 2023-05-06 RX ADMIN — Medication 1000 MILLIGRAM(S): at 06:27

## 2023-05-06 RX ADMIN — Medication 1000 MILLIGRAM(S): at 21:13

## 2023-05-06 RX ADMIN — Medication 10 MILLIGRAM(S): at 06:27

## 2023-05-06 RX ADMIN — TRAMADOL HYDROCHLORIDE 100 MILLIGRAM(S): 50 TABLET ORAL at 09:15

## 2023-05-06 RX ADMIN — CITALOPRAM 10 MILLIGRAM(S): 10 TABLET, FILM COATED ORAL at 13:23

## 2023-05-06 RX ADMIN — TRAMADOL HYDROCHLORIDE 50 MILLIGRAM(S): 50 TABLET ORAL at 01:11

## 2023-05-06 RX ADMIN — Medication 1000 UNIT(S): at 13:22

## 2023-05-06 RX ADMIN — LIDOCAINE 1 PATCH: 4 CREAM TOPICAL at 04:56

## 2023-05-06 RX ADMIN — Medication 10 MILLIEQUIVALENT(S): at 13:23

## 2023-05-06 RX ADMIN — Medication 1000 MILLIGRAM(S): at 21:30

## 2023-05-06 RX ADMIN — SENNA PLUS 2 TABLET(S): 8.6 TABLET ORAL at 21:14

## 2023-05-06 RX ADMIN — CELECOXIB 100 MILLIGRAM(S): 200 CAPSULE ORAL at 06:27

## 2023-05-06 RX ADMIN — ENOXAPARIN SODIUM 40 MILLIGRAM(S): 100 INJECTION SUBCUTANEOUS at 18:26

## 2023-05-06 RX ADMIN — Medication 1000 MILLIGRAM(S): at 13:24

## 2023-05-06 RX ADMIN — Medication 1 MILLIGRAM(S): at 13:23

## 2023-05-06 RX ADMIN — TRAMADOL HYDROCHLORIDE 50 MILLIGRAM(S): 50 TABLET ORAL at 01:35

## 2023-05-06 RX ADMIN — TRAMADOL HYDROCHLORIDE 100 MILLIGRAM(S): 50 TABLET ORAL at 08:17

## 2023-05-06 RX ADMIN — ATORVASTATIN CALCIUM 10 MILLIGRAM(S): 80 TABLET, FILM COATED ORAL at 21:13

## 2023-05-06 NOTE — DISCHARGE NOTE NURSING/CASE MANAGEMENT/SOCIAL WORK - NSDCPEFALRISK_GEN_ALL_CORE
For information on Fall & Injury Prevention, visit: [email or web address] OR  [email or web address] OR  https://www.cdc.gov/steadi/patient.html

## 2023-05-06 NOTE — SOCIAL WORK PROGRESS NOTE - NSSWPROGRESSNOTE_GEN_ALL_CORE
Per Tx team, pt is medically stable for dc to [facility] today. Bed is not available at [facility] this weekend. Pt requested [facility] for [facility], and pt was medically accepted for admission. Pt is scheduled for dc to [facility] today at 3pm via [facility]. Pt and her son were given lette info. Tx team and [facility] admissions aware.

## 2023-05-06 NOTE — DISCHARGE NOTE NURSING/CASE MANAGEMENT/SOCIAL WORK - PATIENT PORTAL LINK FT
You can access the FollowMyHealth Patient Portal offered by [facility] by registering at the following website: [email or web address]. By joining [employer]’s FollowMyHealth portal, you will also be able to view your health information using other applications (apps) compatible with our system.

## 2023-05-06 NOTE — CASE MANAGEMENT PROGRESS NOTE - NSCMPROGRESSNOTE_GEN_ALL_CORE
Received a call from the patient's primary nurse at 18:00 reporting that the ambulette Transport presented to  the patient/ she was unable to sit in the wheelchair due to increased lower back pain Pt with hx of  radicular lumbar pain due to L inferior pubic ramus fx, L sacral  fx.      CM called the [facility] facility, spoke with  nursing supervisor Ms. Xin CALI who stated they cannot accept the patient tonight for it is too late/ she requested for us to transfer the patient tomorrow morning.  CM communicated this with  manager Rashaun DELANEY and CM sent new transportation referral to [facility] requesting Ambulance transportation for tomorrow 5/7/23 at 10AM.    Primary nurse on unit made aware.

## 2023-05-07 VITALS — DIASTOLIC BLOOD PRESSURE: 75 MMHG | OXYGEN SATURATION: 96 % | HEART RATE: 61 BPM | SYSTOLIC BLOOD PRESSURE: 163 MMHG

## 2023-05-07 LAB
24R-OH-CALCIDIOL SERPL-MCNC: 27.6 NG/ML — LOW (ref 30–80)
ANION GAP SERPL CALC-SCNC: 8 MMOL/L — SIGNIFICANT CHANGE UP (ref 5–17)
BUN SERPL-MCNC: 14 MG/DL — SIGNIFICANT CHANGE UP (ref 7–23)
CALCIUM SERPL-MCNC: 9.4 MG/DL — SIGNIFICANT CHANGE UP (ref 8.4–10.5)
CHLORIDE SERPL-SCNC: 99 MMOL/L — SIGNIFICANT CHANGE UP (ref 96–108)
CO2 SERPL-SCNC: 29 MMOL/L — SIGNIFICANT CHANGE UP (ref 22–31)
CREAT SERPL-MCNC: 0.76 MG/DL — SIGNIFICANT CHANGE UP (ref 0.5–1.3)
EGFR: 79 ML/MIN/1.73M2 — SIGNIFICANT CHANGE UP
GLUCOSE SERPL-MCNC: 87 MG/DL — SIGNIFICANT CHANGE UP (ref 70–99)
POTASSIUM SERPL-MCNC: 4.1 MMOL/L — SIGNIFICANT CHANGE UP (ref 3.5–5.3)
POTASSIUM SERPL-SCNC: 4.1 MMOL/L — SIGNIFICANT CHANGE UP (ref 3.5–5.3)
SODIUM SERPL-SCNC: 136 MMOL/L — SIGNIFICANT CHANGE UP (ref 135–145)
T4 FREE SERPL-MCNC: 1.3 NG/DL — SIGNIFICANT CHANGE UP (ref 0.9–1.8)
TSH SERPL-MCNC: 2.11 UIU/ML — SIGNIFICANT CHANGE UP (ref 0.27–4.2)

## 2023-05-07 PROCEDURE — 84439 ASSAY OF FREE THYROXINE: CPT

## 2023-05-07 PROCEDURE — 87635 SARS-COV-2 COVID-19 AMP PRB: CPT

## 2023-05-07 PROCEDURE — 97161 PT EVAL LOW COMPLEX 20 MIN: CPT

## 2023-05-07 PROCEDURE — 81001 URINALYSIS AUTO W/SCOPE: CPT

## 2023-05-07 PROCEDURE — 82306 VITAMIN D 25 HYDROXY: CPT

## 2023-05-07 PROCEDURE — 72131 CT LUMBAR SPINE W/O DYE: CPT | Mod: MA

## 2023-05-07 PROCEDURE — 99285 EMERGENCY DEPT VISIT HI MDM: CPT | Mod: 25

## 2023-05-07 PROCEDURE — 71045 X-RAY EXAM CHEST 1 VIEW: CPT

## 2023-05-07 PROCEDURE — 99232 SBSQ HOSP IP/OBS MODERATE 35: CPT

## 2023-05-07 PROCEDURE — 80053 COMPREHEN METABOLIC PANEL: CPT

## 2023-05-07 PROCEDURE — 97165 OT EVAL LOW COMPLEX 30 MIN: CPT

## 2023-05-07 PROCEDURE — 83735 ASSAY OF MAGNESIUM: CPT

## 2023-05-07 PROCEDURE — 80048 BASIC METABOLIC PNL TOTAL CA: CPT

## 2023-05-07 PROCEDURE — 96374 THER/PROPH/DIAG INJ IV PUSH: CPT

## 2023-05-07 PROCEDURE — 85025 COMPLETE CBC W/AUTO DIFF WBC: CPT

## 2023-05-07 PROCEDURE — 93005 ELECTROCARDIOGRAM TRACING: CPT

## 2023-05-07 PROCEDURE — 84443 ASSAY THYROID STIM HORMONE: CPT

## 2023-05-07 PROCEDURE — 85730 THROMBOPLASTIN TIME PARTIAL: CPT

## 2023-05-07 PROCEDURE — 72192 CT PELVIS W/O DYE: CPT | Mod: MA

## 2023-05-07 PROCEDURE — 85610 PROTHROMBIN TIME: CPT

## 2023-05-07 PROCEDURE — 36415 COLL VENOUS BLD VENIPUNCTURE: CPT

## 2023-05-07 RX ADMIN — TRAMADOL HYDROCHLORIDE 100 MILLIGRAM(S): 50 TABLET ORAL at 00:18

## 2023-05-07 RX ADMIN — Medication 1000 MILLIGRAM(S): at 05:40

## 2023-05-07 RX ADMIN — Medication 10 MILLIGRAM(S): at 05:36

## 2023-05-07 RX ADMIN — Medication 1000 UNIT(S): at 09:57

## 2023-05-07 RX ADMIN — TRAMADOL HYDROCHLORIDE 100 MILLIGRAM(S): 50 TABLET ORAL at 00:03

## 2023-05-07 RX ADMIN — Medication 1 MILLIGRAM(S): at 09:57

## 2023-05-07 RX ADMIN — CELECOXIB 100 MILLIGRAM(S): 200 CAPSULE ORAL at 05:36

## 2023-05-07 RX ADMIN — Medication 25 MILLIGRAM(S): at 05:36

## 2023-05-07 RX ADMIN — Medication 1000 MILLIGRAM(S): at 05:36

## 2023-05-07 RX ADMIN — CITALOPRAM 10 MILLIGRAM(S): 10 TABLET, FILM COATED ORAL at 09:57

## 2023-05-07 RX ADMIN — TRAMADOL HYDROCHLORIDE 100 MILLIGRAM(S): 50 TABLET ORAL at 09:03

## 2023-05-07 RX ADMIN — PANTOPRAZOLE SODIUM 40 MILLIGRAM(S): 20 TABLET, DELAYED RELEASE ORAL at 06:47

## 2023-05-07 RX ADMIN — Medication 10 MILLIEQUIVALENT(S): at 10:01

## 2023-05-07 RX ADMIN — TRAMADOL HYDROCHLORIDE 100 MILLIGRAM(S): 50 TABLET ORAL at 10:00

## 2023-05-07 RX ADMIN — CELECOXIB 100 MILLIGRAM(S): 200 CAPSULE ORAL at 05:40

## 2023-05-07 RX ADMIN — LIDOCAINE 1 PATCH: 4 CREAM TOPICAL at 09:02

## 2023-05-07 NOTE — PROGRESS NOTE ADULT - ASSESSMENT
78F Ulcerative colitis, GERD/ PUD, HTN, HLD, Left breast CA s/p lumpectomy, s/p fall approx 3 weeks ago with left elbow fx s/p ORIF, with increasing left lower back pain.     Back pain, left radiating to right, without neurological deficit  - d/w Dr Murphy at bedside, consult appreciated  - Pain control: Tylenol, celebrex, lidocaine patch and tramadol prn  - PT/OT  - bowel regimen while on pain meds    HTN  - continue toprol and enalapril with hold parameters    HLD   - continue statin     Anxiety  - continue celexa    GERD/ PUD  - continue daily PPI    Hypokalemia  - repletion as ordered    DVT proph  - lovenox daily    Dispo: will likely need [facility]
Patient is a 78 F Ulcerative colitis, GERD/ PUD, HTN, HLD, Left breast CA s/p lumpectomy, s/p fall approx 3 weeks ago with left elbow fx s/p ORIF, with back pain, no acute fracture.     # Back pain, left radiating to right, without neurological deficit  - Ortho recs appreciated, no intervention, medical supportive care   - Pain control: Tylenol, celebrex, lidocaine patch and tramadol prn  - PT/OT  - bowel regimen while on pain meds    # HTN'  - -160  - continue toprol   - Increased home dose of Enalapril 10 mg daily to BID     # HLD   - continue statin     # Anxiety  - continue celexa    # GERD/ PUD  - continue daily PPI    # Osteoporosis   - Found to have compression fracture on spine imaging   - Outpatient not on osteoporotic medication outpatient   - Cotninue with vitamin D supplement   - Check vitamin D  - Will need osteoporotic medication and DEXA scan outpatient     # Hyperthyroidism   - On methimazole 5 mg daily   - Patient unclear why she is on thyroid medications   - Check TSH and Free T4    DVT proph  - lovenox daily    Dispo: medically stable for [facility]
Patient is a 78 F Ulcerative colitis, GERD/ PUD, HTN, HLD, Left breast CA s/p lumpectomy, s/p fall approx 3 weeks ago with left elbow fx s/p ORIF, with back pain, no acute fracture.     # Back pain, left radiating to right, without neurological deficit  - Ortho recs appreciated, no intervention, medical supportive care   - Pain control: Tylenol, celebrex, lidocaine patch and tramadol prn  - PT/OT  - bowel regimen while on pain meds    # HTN'  - -160  - continue toprol   - Increase home dose of Enalapril 10 mg daily to BID     # HLD   - continue statin     # Anxiety  - continue celexa    # GERD/ PUD  - continue daily PPI    # Osteoporosis   - Found to have compression fracture on spine imaging   - Outpatient not on osteoporotic medication outpatient   - Cotninue with vitamin D supplement   - Check vitamin D  - Will need osteoporotic medication and DEXA scan outpatient     # Hyperthyroidism   - On methimazole 5 mg daily   - Patient unclear why she is on thyroid medications   - Check TSH and Free T4    DVT proph  - lovenox daily    Dispo: medically stable for [facility]

## 2023-05-07 NOTE — SOCIAL WORK PROGRESS NOTE - NSSWPROGRESSNOTE_GEN_ALL_CORE
Pt will be discharged today at 10am via [facility] ambulance to [facility].  SW [facility].  DEVAN spoke to 2 [place] unit clerk to remind them of 10am .

## 2023-05-07 NOTE — PROGRESS NOTE ADULT - SUBJECTIVE AND OBJECTIVE BOX
Patient is a 78y old  Female who presents with a chief complaint of low back pain (04 May 2023 20:06)      INTERVAL HPI/OVERNIGHT EVENTS:  patient seen at the bedside. Being transferred to a stretcher and going to rehab today.     MEDICATIONS  (STANDING):  acetaminophen     Tablet .. 1000 milliGRAM(s) Oral every 8 hours  atorvastatin 10 milliGRAM(s) Oral at bedtime  celecoxib 100 milliGRAM(s) Oral every 12 hours  cholecalciferol 1000 Unit(s) Oral daily  citalopram 10 milliGRAM(s) Oral daily  enalapril 10 milliGRAM(s) Oral two times a day  enoxaparin Injectable 40 milliGRAM(s) SubCutaneous every 24 hours  folic acid 1 milliGRAM(s) Oral daily  lidocaine   4% Patch 1 Patch Transdermal <User Schedule>  methimazole 5 milliGRAM(s) Oral daily  metoprolol succinate ER 25 milliGRAM(s) Oral daily  pantoprazole    Tablet 40 milliGRAM(s) Oral before breakfast  potassium chloride    Tablet ER 10 milliEquivalent(s) Oral daily  senna 2 Tablet(s) Oral at bedtime    MEDICATIONS  (PRN):  polyethylene glycol 3350 17 Gram(s) Oral daily PRN Constipation  traMADol 50 milliGRAM(s) Oral every 4 hours PRN Moderate Pain (4 - 6)  traMADol 100 milliGRAM(s) Oral every 6 hours PRN Severe Pain (7 - 10)      Allergies  No Known Allergies    REVIEW OF SYSTEMS:  CONSTITUTIONAL: No fever, weight loss, or fatigue  EYES: No eye pain, visual disturbances, or discharge  ENMT:  No difficulty hearing, tinnitus, vertigo; No sinus or throat pain  NECK: No pain or stiffness  BREASTS: No pain, masses, or nipple discharge  RESPIRATORY: No cough, wheezing   CARDIOVASCULAR: No chest pain, palpitations   GASTROINTESTINAL: No abdominal or epigastric pain. No nausea, vomiting  GENITOURINARY: No dysuria, frequency, hematuria, or incontinence  NEUROLOGICAL: No headaches   SKIN: No itching   LYMPH NODES: No enlarged glands   MUSCULOSKELETAL: see hpi  PSYCHIATRIC: No depression, anxiety, or mood swings  HEME/LYMPH: No easy bruising, or bleeding gums  ALLERGY AND IMMUNOLOGIC: No hives or eczema     Vital Signs Last 24 Hrs  T(C): 36.6 (07 May 2023 08:59), Max: 36.7 (06 May 2023 15:00)  T(F): 97.9 (07 May 2023 08:59), Max: 98.1 (06 May 2023 15:00)  HR: 61 (07 May 2023 10:28) (55 - 67)  BP: 163/75 (07 May 2023 10:28) (143/81 - 171/86)  BP(mean): --  RR: 15 (07 May 2023 08:59) (15 - 18)  SpO2: 96% (07 May 2023 10:28) (96% - 99%)    Parameters below as of 06 May 2023 23:30  Patient On (Oxygen Delivery Method): room air          PHYSICAL EXAM:  GENERAL: NAD, well-groomed, well-developed  HEAD:  Atraumatic, Normocephalic  EYES: conjunctiva and sclera clear  ENMT: Moist mucous membranes  NECK: Supple, No JVD  NERVOUS SYSTEM:  Alert & Oriented X3, Good concentration; All 4 extremities mobile, no gross sensory deficits.   CHEST/LUNG: Clear to auscultation bilaterally;   HEART: Regular rate and rhythm; No murmurs, rubs, or gallops  ABDOMEN: Soft, Nontender, Nondistended; Bowel sounds present  EXTREMITIES:  2+ Peripheral Pulses, No clubbing, cyanosis, or edema  LYMPH: No lymphadenopathy noted  SKIN: No rashes or lesions    LABS:                136  |  99  |  14  ----------------------------<  87  4.1   |  29  |  0.76    Ca    9.4      07 May 2023 06:38      PT/INR - ( 04 May 2023 16:00 )   PT: 13.1 sec;   INR: 1.11 ratio    PTT - ( 04 May 2023 16:00 )  PTT:30.4 sec    Urinalysis Basic - ( 04 May 2023 16:57 )  Color: Yellow / Appearance: Clear / S.010 / pH: x  Gluc: x / Ketone: 15 mg/dL  / Bili: Negative / Urobili: 1.0 mg/dL   Blood: x / Protein: 30 mg/dL / Nitrite: Negative   Leuk Esterase: Negative / RBC: 1 /HPF / WBC 0 /HPF   Sq Epi: x / Non Sq Epi: x / Bacteria: x      CAPILLARY BLOOD GLUCOSE          RADIOLOGY & ADDITIONAL TESTS:    Imaging Personally Reviewed:  [ ] YES     Consultant(s) Notes Reviewed:      Care Discussed with Consultants/Other Providers:    Advanced Directives: [ ] DNR  [ ] No feeding tube  [ ] MOLST in chart  [ ] MOLST completed today  [ ] Unknown      Redemonstration of L4 total laminectomy and posterior fusion of L4 and L5.    Hardware is well-placed. No evidence for hardware fracture or loosening.    No acute fracture or traumatic subluxation.    Mild compression deformity of the superior endplate of L2 is associated   with the presence of a Schmorl's node.    Degenerative changes.    Evaluation of the spinal canal contents is limited by CT modality and   streak artifact from spinal hardware.    Suspected moderate spinal canal stenosis at L2-L3 and L3-L4.    Multilevel neural foraminal narrowing, mild to moderate in degree.  
Patient is a 78y old  Female who presents with a chief complaint of low back pain (04 May 2023 20:06)      INTERVAL HPI/OVERNIGHT EVENTS:  still with severe pain when tries to sit up or stand.  was unable to sit with PT this am.  but otherwise feeling well.     MEDICATIONS  (STANDING):  acetaminophen     Tablet .. 1000 milliGRAM(s) Oral every 8 hours  atorvastatin 10 milliGRAM(s) Oral at bedtime  celecoxib 100 milliGRAM(s) Oral every 12 hours  cholecalciferol 1000 Unit(s) Oral daily  citalopram 10 milliGRAM(s) Oral daily  enalapril 10 milliGRAM(s) Oral daily  enoxaparin Injectable 40 milliGRAM(s) SubCutaneous every 24 hours  folic acid 1 milliGRAM(s) Oral daily  lidocaine   4% Patch 1 Patch Transdermal <User Schedule>  methimazole 5 milliGRAM(s) Oral daily  metoprolol succinate ER 25 milliGRAM(s) Oral daily  pantoprazole    Tablet 40 milliGRAM(s) Oral before breakfast  potassium chloride    Tablet ER 10 milliEquivalent(s) Oral daily    MEDICATIONS  (PRN):  polyethylene glycol 3350 17 Gram(s) Oral daily PRN Constipation  traMADol 50 milliGRAM(s) Oral every 4 hours PRN Moderate Pain (4 - 6)  traMADol 100 milliGRAM(s) Oral every 6 hours PRN Severe Pain (7 - 10)      Allergies  No Known Allergies    REVIEW OF SYSTEMS:  CONSTITUTIONAL: No fever, weight loss, or fatigue  EYES: No eye pain, visual disturbances, or discharge  ENMT:  No difficulty hearing, tinnitus, vertigo; No sinus or throat pain  NECK: No pain or stiffness  BREASTS: No pain, masses, or nipple discharge  RESPIRATORY: No cough, wheezing, chills or hemoptysis; No shortness of breath  CARDIOVASCULAR: No chest pain, palpitations, lightheadedness, or leg swelling  GASTROINTESTINAL: No abdominal or epigastric pain. No nausea, vomiting, or hematemesis; No diarrhea or constipation. No melena or hematochezia.  GENITOURINARY: No dysuria, frequency, hematuria, or incontinence  NEUROLOGICAL: No headaches, memory loss, vertigo, loss of strength, numbness, or tremors  SKIN: No itching, burning, rashes, or lesions   LYMPH NODES: No enlarged glands  ENDOCRINE: No heat or cold intolerance; No hair loss; No polydipsia or polyuria  MUSCULOSKELETAL: see hpi  PSYCHIATRIC: No depression, anxiety, or mood swings  HEME/LYMPH: No easy bruising, or bleeding gums  ALLERGY AND IMMUNOLOGIC: No hives or eczema    Vital Signs Last 24 Hrs  T(C): 36.6 (05 May 2023 08:18), Max: 37 (04 May 2023 18:20)  T(F): 97.8 (05 May 2023 08:18), Max: 98.6 (04 May 2023 18:20)  HR: 51 (05 May 2023 08:18) (51 - 80)  BP: 157/65 (05 May 2023 08:18) (146/76 - 200/89)  BP(mean): --  RR: 18 (05 May 2023 08:18) (14 - 18)  SpO2: 96% (05 May 2023 08:18) (96% - 99%)    Parameters below as of 05 May 2023 08:18  Patient On (Oxygen Delivery Method): room air        PHYSICAL EXAM:  GENERAL: NAD, well-groomed, well-developed  HEAD:  Atraumatic, Normocephalic  EYES: conjunctiva and sclera clear  ENMT: Moist mucous membranes  NECK: Supple, No JVD  NERVOUS SYSTEM:  Alert & Oriented X3, Good concentration; All 4 extremities mobile, no gross sensory deficits.   CHEST/LUNG: Clear to auscultation bilaterally;   HEART: Regular rate and rhythm; No murmurs, rubs, or gallops  ABDOMEN: Soft, Nontender, Nondistended; Bowel sounds present  EXTREMITIES:  2+ Peripheral Pulses, No clubbing, cyanosis, or edema  LYMPH: No lymphadenopathy noted  SKIN: No rashes or lesions    LABS:                        11.6   3.83  )-----------( 181      ( 05 May 2023 06:00 )             37.3     05 May 2023 06:00    135    |  101    |  11     ----------------------------<  84     4.3     |  25     |  0.66     Ca    9.3        05 May 2023 06:00  Mg     2.0       05 May 2023 06:00    TPro  7.4    /  Alb  3.4    /  TBili  0.9    /  DBili  x      /  AST  18     /  ALT  17     /  AlkPhos  135    04 May 2023 16:00    PT/INR - ( 04 May 2023 16:00 )   PT: 13.1 sec;   INR: 1.11 ratio    PTT - ( 04 May 2023 16:00 )  PTT:30.4 sec    Urinalysis Basic - ( 04 May 2023 16:57 )  Color: Yellow / Appearance: Clear / S.010 / pH: x  Gluc: x / Ketone: 15 mg/dL  / Bili: Negative / Urobili: 1.0 mg/dL   Blood: x / Protein: 30 mg/dL / Nitrite: Negative   Leuk Esterase: Negative / RBC: 1 /HPF / WBC 0 /HPF   Sq Epi: x / Non Sq Epi: x / Bacteria: x      CAPILLARY BLOOD GLUCOSE          RADIOLOGY & ADDITIONAL TESTS:    Imaging Personally Reviewed:  [ ] YES     Consultant(s) Notes Reviewed:      Care Discussed with Consultants/Other Providers:    Advanced Directives: [ ] DNR  [ ] No feeding tube  [ ] MOLST in chart  [ ] MOLST completed today  [ ] Unknown  
Patient is a 78y old  Female who presents with a chief complaint of low back pain (04 May 2023 20:06)      INTERVAL HPI/OVERNIGHT EVENTS:  patient seen at the bedside. Eating lunch, denies nausea or vomiting. Endorsing good appetite. Patient said her back pain is controlled when she is not moving. Her BP elevated this morning.     MEDICATIONS  (STANDING):  acetaminophen     Tablet .. 1000 milliGRAM(s) Oral every 8 hours  atorvastatin 10 milliGRAM(s) Oral at bedtime  celecoxib 100 milliGRAM(s) Oral every 12 hours  cholecalciferol 1000 Unit(s) Oral daily  citalopram 10 milliGRAM(s) Oral daily  enalapril 10 milliGRAM(s) Oral daily  enoxaparin Injectable 40 milliGRAM(s) SubCutaneous every 24 hours  folic acid 1 milliGRAM(s) Oral daily  lidocaine   4% Patch 1 Patch Transdermal <User Schedule>  methimazole 5 milliGRAM(s) Oral daily  metoprolol succinate ER 25 milliGRAM(s) Oral daily  pantoprazole    Tablet 40 milliGRAM(s) Oral before breakfast  potassium chloride    Tablet ER 10 milliEquivalent(s) Oral daily    MEDICATIONS  (PRN):  polyethylene glycol 3350 17 Gram(s) Oral daily PRN Constipation  traMADol 50 milliGRAM(s) Oral every 4 hours PRN Moderate Pain (4 - 6)  traMADol 100 milliGRAM(s) Oral every 6 hours PRN Severe Pain (7 - 10)      Allergies  No Known Allergies    REVIEW OF SYSTEMS:  CONSTITUTIONAL: No fever, weight loss, or fatigue  EYES: No eye pain, visual disturbances, or discharge  ENMT:  No difficulty hearing, tinnitus, vertigo; No sinus or throat pain  NECK: No pain or stiffness  BREASTS: No pain, masses, or nipple discharge  RESPIRATORY: No cough, wheezing   CARDIOVASCULAR: No chest pain, palpitations   GASTROINTESTINAL: No abdominal or epigastric pain. No nausea, vomiting  GENITOURINARY: No dysuria, frequency, hematuria, or incontinence  NEUROLOGICAL: No headaches   SKIN: No itching   LYMPH NODES: No enlarged glands   MUSCULOSKELETAL: see hpi  PSYCHIATRIC: No depression, anxiety, or mood swings  HEME/LYMPH: No easy bruising, or bleeding gums  ALLERGY AND IMMUNOLOGIC: No hives or eczema     Vital Signs Last 24 Hrs  T(C): 36.5 (06 May 2023 08:29), Max: 37.1 (05 May 2023 16:05)  T(F): 97.7 (06 May 2023 08:29), Max: 98.7 (05 May 2023 16:05)  HR: 55 (06 May 2023 11:42) (53 - 59)  BP: 160/82 (06 May 2023 11:42) (154/78 - 174/90)  BP(mean): --  RR: 20 (06 May 2023 08:29) (18 - 20)  SpO2: 98% (06 May 2023 08:29) (97% - 99%)    Parameters below as of 06 May 2023 08:29  Patient On (Oxygen Delivery Method): room air        PHYSICAL EXAM:  GENERAL: NAD, well-groomed, well-developed  HEAD:  Atraumatic, Normocephalic  EYES: conjunctiva and sclera clear  ENMT: Moist mucous membranes  NECK: Supple, No JVD  NERVOUS SYSTEM:  Alert & Oriented X3, Good concentration; All 4 extremities mobile, no gross sensory deficits.   CHEST/LUNG: Clear to auscultation bilaterally;   HEART: Regular rate and rhythm; No murmurs, rubs, or gallops  ABDOMEN: Soft, Nontender, Nondistended; Bowel sounds present  EXTREMITIES:  2+ Peripheral Pulses, No clubbing, cyanosis, or edema  LYMPH: No lymphadenopathy noted  SKIN: No rashes or lesions    LABS:                                  11.6   3.83  )-----------( 181      ( 05 May 2023 06:00 )             37.3       05 May 2023 06:00    135    |  101    |  11     ----------------------------<  84     4.3     |  25     |  0.66     Ca    9.3        05 May 2023 06:00  Mg     2.0       05 May 2023 06:00    TPro  7.4    /  Alb  3.4    /  TBili  0.9    /  DBili  x      /  AST  18     /  ALT  17     /  AlkPhos  135    04 May 2023 16:00    PT/INR - ( 04 May 2023 16:00 )   PT: 13.1 sec;   INR: 1.11 ratio    PTT - ( 04 May 2023 16:00 )  PTT:30.4 sec    Urinalysis Basic - ( 04 May 2023 16:57 )  Color: Yellow / Appearance: Clear / S.010 / pH: x  Gluc: x / Ketone: 15 mg/dL  / Bili: Negative / Urobili: 1.0 mg/dL   Blood: x / Protein: 30 mg/dL / Nitrite: Negative   Leuk Esterase: Negative / RBC: 1 /HPF / WBC 0 /HPF   Sq Epi: x / Non Sq Epi: x / Bacteria: x      CAPILLARY BLOOD GLUCOSE          RADIOLOGY & ADDITIONAL TESTS:    Imaging Personally Reviewed:  [ ] YES     Consultant(s) Notes Reviewed:      Care Discussed with Consultants/Other Providers:    Advanced Directives: [ ] DNR  [ ] No feeding tube  [ ] MOLST in chart  [ ] MOLST completed today  [ ] Unknown      Redemonstration of L4 total laminectomy and posterior fusion of L4 and L5.    Hardware is well-placed. No evidence for hardware fracture or loosening.    No acute fracture or traumatic subluxation.    Mild compression deformity of the superior endplate of L2 is associated   with the presence of a Schmorl's node.    Degenerative changes.    Evaluation of the spinal canal contents is limited by CT modality and   streak artifact from spinal hardware.    Suspected moderate spinal canal stenosis at L2-L3 and L3-L4.    Multilevel neural foraminal narrowing, mild to moderate in degree.

## 2023-05-25 ENCOUNTER — APPOINTMENT (OUTPATIENT)
Dept: ORTHOPEDIC SURGERY | Facility: CLINIC | Age: 78
End: 2023-05-25

## 2023-11-17 NOTE — PATIENT PROFILE ADULT - VISION (WITH CORRECTIVE LENSES IF THE PATIENT USUALLY WEARS THEM):
Normal vision: sees adequately in most situations; can see medication labels, newsprint
Uterine artery doppler abnornal at 13 weeks  gestational Hypothyroidism

## 2023-12-04 ENCOUNTER — APPOINTMENT (OUTPATIENT)
Dept: ORTHOPEDIC SURGERY | Facility: CLINIC | Age: 78
End: 2023-12-04
Payer: MEDICARE

## 2023-12-04 ENCOUNTER — NON-APPOINTMENT (OUTPATIENT)
Age: 78
End: 2023-12-04

## 2023-12-04 DIAGNOSIS — M54.9 DORSALGIA, UNSPECIFIED: ICD-10-CM

## 2023-12-04 DIAGNOSIS — M54.16 RADICULOPATHY, LUMBAR REGION: ICD-10-CM

## 2023-12-04 PROCEDURE — 99215 OFFICE O/P EST HI 40 MIN: CPT

## 2023-12-04 PROCEDURE — 72110 X-RAY EXAM L-2 SPINE 4/>VWS: CPT

## 2023-12-04 RX ORDER — GABAPENTIN 100 MG/1
100 CAPSULE ORAL
Qty: 30 | Refills: 2 | Status: ACTIVE | COMMUNITY
Start: 2023-12-04 | End: 1900-01-01

## 2025-05-07 ENCOUNTER — NON-APPOINTMENT (OUTPATIENT)
Age: 80
End: 2025-05-07

## (undated) DEVICE — SUT POLYSORB 0 30" GS-12 UNDYED

## (undated) DEVICE — TOURNIQUET ESMARK 4"

## (undated) DEVICE — DRILL BIT SYNTHES ORTHO 2.0MM W DEPTH MARK QC 110MM

## (undated) DEVICE — WARMING BLANKET LOWER ADULT

## (undated) DEVICE — ELCTR BOVIE PENCIL BLADE 10FT

## (undated) DEVICE — PACK UPPER EXTREMITY

## (undated) DEVICE — DRAPE C ARM UNIVERSAL

## (undated) DEVICE — TUBING SUCTION NONCONDUCTIVE 6MM X 12FT

## (undated) DEVICE — GLV 7.5 PROTEXIS (WHITE)

## (undated) DEVICE — SUT POLYSORB 2-0 30" C-14 UNDYED

## (undated) DEVICE — BLADE SCALPEL SAFETYLOCK #15

## (undated) DEVICE — SLING SHOULDER IMMOBILIZER CLINIC LARGE

## (undated) DEVICE — WARMING BLANKET UPPER ADULT

## (undated) DEVICE — GLV 8 PROTEXIS (BLUE)

## (undated) DEVICE — VENODYNE/SCD SLEEVE CALF MEDIUM

## (undated) DEVICE — DRILL BIT SYNTHES ORTHO QC 2.5X110MM

## (undated) DEVICE — TOURNIQUET CUFF 18" DUAL PORT SINGLE BLADDER LUER LOCK (BLACK)

## (undated) DEVICE — SUT MONOCRYL 4-0 18" P-3 UNDYED